# Patient Record
Sex: MALE | Race: OTHER | NOT HISPANIC OR LATINO | Employment: OTHER | URBAN - NONMETROPOLITAN AREA
[De-identification: names, ages, dates, MRNs, and addresses within clinical notes are randomized per-mention and may not be internally consistent; named-entity substitution may affect disease eponyms.]

---

## 2020-05-28 ENCOUNTER — TRANSCRIBE ORDERS (OUTPATIENT)
Dept: ADMINISTRATIVE | Facility: HOSPITAL | Age: 60
End: 2020-05-28

## 2020-05-28 DIAGNOSIS — Z01.818 PRE-OPERATIVE CLEARANCE: Primary | ICD-10-CM

## 2020-05-29 ENCOUNTER — LAB (OUTPATIENT)
Dept: LAB | Facility: HOSPITAL | Age: 60
End: 2020-05-29

## 2020-05-29 DIAGNOSIS — Z01.818 PRE-OPERATIVE CLEARANCE: ICD-10-CM

## 2020-05-29 LAB
REF LAB TEST METHOD: NORMAL
SARS-COV-2 RNA RESP QL NAA+PROBE: NOT DETECTED

## 2020-06-17 ENCOUNTER — TRANSCRIBE ORDERS (OUTPATIENT)
Dept: ADMINISTRATIVE | Facility: HOSPITAL | Age: 60
End: 2020-06-17

## 2020-06-17 DIAGNOSIS — Z01.818 PRE-OPERATIVE CLEARANCE: Primary | ICD-10-CM

## 2020-06-19 ENCOUNTER — LAB (OUTPATIENT)
Dept: LAB | Facility: HOSPITAL | Age: 60
End: 2020-06-19

## 2020-06-19 DIAGNOSIS — Z01.818 PRE-OPERATIVE CLEARANCE: ICD-10-CM

## 2020-06-19 PROCEDURE — U0002 COVID-19 LAB TEST NON-CDC: HCPCS

## 2020-06-19 PROCEDURE — C9803 HOPD COVID-19 SPEC COLLECT: HCPCS

## 2020-06-19 PROCEDURE — U0004 COV-19 TEST NON-CDC HGH THRU: HCPCS

## 2020-06-20 LAB
REF LAB TEST METHOD: NORMAL
SARS-COV-2 RNA RESP QL NAA+PROBE: NOT DETECTED

## 2020-11-02 ENCOUNTER — APPOINTMENT (OUTPATIENT)
Dept: PREADMISSION TESTING | Facility: HOSPITAL | Age: 60
End: 2020-11-02

## 2020-11-02 ENCOUNTER — HOSPITAL ENCOUNTER (OUTPATIENT)
Dept: GENERAL RADIOLOGY | Facility: HOSPITAL | Age: 60
Discharge: HOME | End: 2020-11-02

## 2020-11-02 ENCOUNTER — ANESTHESIA EVENT (OUTPATIENT)
Dept: PERIOP | Facility: HOSPITAL | Age: 60
End: 2020-11-02

## 2020-11-02 VITALS — BODY MASS INDEX: 43.86 KG/M2 | HEIGHT: 62 IN | WEIGHT: 238.32 LBS

## 2020-11-02 LAB
ALBUMIN SERPL-MCNC: 4.1 G/DL (ref 3.5–5.2)
ALBUMIN/GLOB SERPL: 1.6 G/DL
ALP SERPL-CCNC: 29 U/L (ref 39–117)
ALT SERPL W P-5'-P-CCNC: 7 U/L (ref 1–41)
ANION GAP SERPL CALCULATED.3IONS-SCNC: 9 MMOL/L (ref 5–15)
APTT PPP: 30.8 SECONDS (ref 24–37)
AST SERPL-CCNC: 11 U/L (ref 1–40)
BASOPHILS # BLD AUTO: 0.02 10*3/MM3 (ref 0–0.2)
BASOPHILS NFR BLD AUTO: 0.3 % (ref 0–1.5)
BILIRUB SERPL-MCNC: 0.2 MG/DL (ref 0–1.2)
BUN SERPL-MCNC: 9 MG/DL (ref 8–23)
BUN/CREAT SERPL: 11.3 (ref 7–25)
CALCIUM SPEC-SCNC: 9 MG/DL (ref 8.6–10.5)
CHLORIDE SERPL-SCNC: 106 MMOL/L (ref 98–107)
CO2 SERPL-SCNC: 25 MMOL/L (ref 22–29)
CREAT SERPL-MCNC: 0.8 MG/DL (ref 0.76–1.27)
DEPRECATED RDW RBC AUTO: 49.4 FL (ref 37–54)
EOSINOPHIL # BLD AUTO: 0.14 10*3/MM3 (ref 0–0.4)
EOSINOPHIL NFR BLD AUTO: 2.2 % (ref 0.3–6.2)
ERYTHROCYTE [DISTWIDTH] IN BLOOD BY AUTOMATED COUNT: 13.3 % (ref 12.3–15.4)
GFR SERPL CREATININE-BSD FRML MDRD: 119 ML/MIN/1.73
GFR SERPL CREATININE-BSD FRML MDRD: 98 ML/MIN/1.73
GLOBULIN UR ELPH-MCNC: 2.5 GM/DL
GLUCOSE SERPL-MCNC: 107 MG/DL (ref 65–99)
HCT VFR BLD AUTO: 41.6 % (ref 37.5–51)
HGB BLD-MCNC: 12.9 G/DL (ref 13–17.7)
IMM GRANULOCYTES # BLD AUTO: 0.01 10*3/MM3 (ref 0–0.05)
IMM GRANULOCYTES NFR BLD AUTO: 0.2 % (ref 0–0.5)
INR PPP: 1.08 (ref 0.85–1.16)
LYMPHOCYTES # BLD AUTO: 1.07 10*3/MM3 (ref 0.7–3.1)
LYMPHOCYTES NFR BLD AUTO: 16.9 % (ref 19.6–45.3)
MCH RBC QN AUTO: 30.9 PG (ref 26.6–33)
MCHC RBC AUTO-ENTMCNC: 31 G/DL (ref 31.5–35.7)
MCV RBC AUTO: 99.8 FL (ref 79–97)
MONOCYTES # BLD AUTO: 0.55 10*3/MM3 (ref 0.1–0.9)
MONOCYTES NFR BLD AUTO: 8.7 % (ref 5–12)
NEUTROPHILS NFR BLD AUTO: 4.54 10*3/MM3 (ref 1.7–7)
NEUTROPHILS NFR BLD AUTO: 71.7 % (ref 42.7–76)
NRBC BLD AUTO-RTO: 0 /100 WBC (ref 0–0.2)
PLATELET # BLD AUTO: 112 10*3/MM3 (ref 140–450)
PMV BLD AUTO: 11.3 FL (ref 6–12)
POTASSIUM SERPL-SCNC: 4.2 MMOL/L (ref 3.5–5.2)
PROT SERPL-MCNC: 6.6 G/DL (ref 6–8.5)
PROTHROMBIN TIME: 13.7 SECONDS (ref 11.5–14)
QT INTERVAL: 448 MS
QTC INTERVAL: 408 MS
RBC # BLD AUTO: 4.17 10*6/MM3 (ref 4.14–5.8)
SODIUM SERPL-SCNC: 140 MMOL/L (ref 136–145)
WBC # BLD AUTO: 6.33 10*3/MM3 (ref 3.4–10.8)

## 2020-11-02 PROCEDURE — 85730 THROMBOPLASTIN TIME PARTIAL: CPT | Performed by: SURGERY

## 2020-11-02 PROCEDURE — 85610 PROTHROMBIN TIME: CPT | Performed by: SURGERY

## 2020-11-02 PROCEDURE — 80053 COMPREHEN METABOLIC PANEL: CPT | Performed by: SURGERY

## 2020-11-02 PROCEDURE — U0004 COV-19 TEST NON-CDC HGH THRU: HCPCS

## 2020-11-02 PROCEDURE — 71046 X-RAY EXAM CHEST 2 VIEWS: CPT

## 2020-11-02 PROCEDURE — 93005 ELECTROCARDIOGRAM TRACING: CPT

## 2020-11-02 PROCEDURE — 93010 ELECTROCARDIOGRAM REPORT: CPT | Performed by: INTERNAL MEDICINE

## 2020-11-02 PROCEDURE — 36415 COLL VENOUS BLD VENIPUNCTURE: CPT

## 2020-11-02 PROCEDURE — C9803 HOPD COVID-19 SPEC COLLECT: HCPCS

## 2020-11-02 PROCEDURE — 85025 COMPLETE CBC W/AUTO DIFF WBC: CPT | Performed by: SURGERY

## 2020-11-02 RX ORDER — OXYCODONE HCL 20 MG/1
20 TABLET, FILM COATED, EXTENDED RELEASE ORAL EVERY 12 HOURS
COMMUNITY
End: 2020-11-11 | Stop reason: HOSPADM

## 2020-11-02 RX ORDER — TRAZODONE HYDROCHLORIDE 100 MG/1
50 TABLET ORAL 2 TIMES DAILY
COMMUNITY

## 2020-11-02 RX ORDER — GABAPENTIN 400 MG/1
600 CAPSULE ORAL 3 TIMES DAILY
COMMUNITY

## 2020-11-02 RX ORDER — EZETIMIBE 10 MG/1
10 TABLET ORAL NIGHTLY
COMMUNITY

## 2020-11-02 RX ORDER — METOCLOPRAMIDE 10 MG/1
10 TABLET ORAL
COMMUNITY

## 2020-11-02 RX ORDER — OXYCODONE AND ACETAMINOPHEN 10; 325 MG/1; MG/1
1 TABLET ORAL EVERY 6 HOURS PRN
Status: ON HOLD | COMMUNITY
End: 2020-11-11 | Stop reason: SDUPTHER

## 2020-11-02 RX ORDER — NITROGLYCERIN 80 MG/1
1 PATCH TRANSDERMAL DAILY
COMMUNITY

## 2020-11-02 RX ORDER — ASPIRIN 81 MG/1
81 TABLET ORAL DAILY
COMMUNITY

## 2020-11-02 RX ORDER — AMOXICILLIN 875 MG/1
875 TABLET, COATED ORAL 2 TIMES DAILY
COMMUNITY
End: 2020-11-11 | Stop reason: HOSPADM

## 2020-11-02 RX ORDER — LEVOTHYROXINE SODIUM 88 UG/1
88 TABLET ORAL DAILY
COMMUNITY

## 2020-11-02 RX ORDER — TOPIRAMATE 50 MG/1
50 TABLET, FILM COATED ORAL 2 TIMES DAILY
COMMUNITY

## 2020-11-02 RX ORDER — LORAZEPAM 2 MG/1
2 TABLET ORAL EVERY 6 HOURS PRN
COMMUNITY

## 2020-11-02 RX ORDER — CHOLECALCIFEROL (VITAMIN D3) 50 MCG
2000 TABLET ORAL DAILY
COMMUNITY

## 2020-11-02 RX ORDER — FEXOFENADINE HCL AND PSEUDOEPHEDRINE HCI 180; 240 MG/1; MG/1
1 TABLET, EXTENDED RELEASE ORAL DAILY PRN
COMMUNITY

## 2020-11-02 RX ORDER — RISPERIDONE 1 MG/1
1 TABLET ORAL 2 TIMES DAILY
COMMUNITY

## 2020-11-02 RX ORDER — TIZANIDINE 4 MG/1
4 TABLET ORAL EVERY 8 HOURS PRN
COMMUNITY

## 2020-11-02 RX ORDER — NITROGLYCERIN 0.4 MG/1
0.4 TABLET SUBLINGUAL
COMMUNITY

## 2020-11-02 RX ORDER — ROSUVASTATIN CALCIUM 10 MG/1
10 TABLET, COATED ORAL NIGHTLY
COMMUNITY

## 2020-11-02 RX ORDER — FERROUS SULFATE 325(65) MG
325 TABLET ORAL
COMMUNITY

## 2020-11-02 RX ORDER — BUTALBITAL, ACETAMINOPHEN AND CAFFEINE 50; 325; 40 MG/1; MG/1; MG/1
1 TABLET ORAL 2 TIMES DAILY
COMMUNITY

## 2020-11-02 RX ORDER — PHENOL 1.4 %
AEROSOL, SPRAY (ML) MUCOUS MEMBRANE NIGHTLY
COMMUNITY

## 2020-11-02 RX ORDER — ESCITALOPRAM OXALATE 20 MG/1
20 TABLET ORAL 2 TIMES DAILY
COMMUNITY

## 2020-11-02 RX ORDER — ESOMEPRAZOLE MAGNESIUM 40 MG/1
40 CAPSULE, DELAYED RELEASE ORAL
COMMUNITY

## 2020-11-02 NOTE — PAT
Per dr herman orders- anesthesia (dr desir) came and evaluated pt's airway and states okay to proceed.         Message left for layo to call back about antibiotic use.

## 2020-11-03 LAB — SARS-COV-2 RNA RESP QL NAA+PROBE: NOT DETECTED

## 2020-11-04 ENCOUNTER — DOCUMENTATION (OUTPATIENT)
Dept: NUTRITION | Facility: HOSPITAL | Age: 60
End: 2020-11-04

## 2020-11-04 NOTE — PROGRESS NOTES
Oncology Nutrition Screening    Patient Name:  Sonido Villaseñor  YOB: 1960  MRN: [id number]  Date:  11/04/20  Physician: Dr. 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  Patient has had numerous surgeries on his throat related to scar tissue which has led to a 7 inch reduction in his height / wife states that his chin now sits on top of his chest.    There is no problem list on file for this patient.      Current Outpatient Medications   Medication Sig Dispense Refill   • amoxicillin (AMOXIL) 875 MG tablet Take 875 mg by mouth 2 (Two) Times a Day. 10 days -  Started it 30th     • aspirin 81 MG EC tablet Take 81 mg by mouth Daily.     • butalbital-acetaminophen-caffeine (FIORICET, ESGIC) -40 MG per tablet Take 1 tablet by mouth 2 (two) times a day. No more than 2-3 times per week     • Cholecalciferol (Vitamin D) 50 MCG (2000 UT) tablet Take 2,000 Units by mouth Daily.     • escitalopram (LEXAPRO) 20 MG tablet Take 20 mg by mouth 2 (two) times a day.     • esomeprazole (nexIUM) 40 MG capsule Take 40 mg by mouth Every Morning Before Breakfast.     • ezetimibe (ZETIA) 10 MG tablet Take 10 mg by mouth Every Night.     • ferrous sulfate 325 (65 FE) MG tablet Take 325 mg by mouth Daily With Breakfast.     • fexofenadine-pseudoephedrine (ALLEGRA-D 24) 180-240 MG per 24 hr tablet Take 1 tablet by mouth Daily As Needed for Allergies.     • gabapentin (NEURONTIN) 400 MG capsule Take 400 mg by mouth 3 (Three) Times a Day.     • levothyroxine (SYNTHROID, LEVOTHROID) 88 MCG tablet Take 88 mcg by mouth Daily.     • LORazepam (ATIVAN) 2 MG tablet Take 2 mg by mouth Every 6 (Six) Hours As Needed for Anxiety.     • Melatonin 10 MG tablet Take  by mouth Every Night.     • metoclopramide (REGLAN) 10 MG tablet Take 10 mg by mouth 4 (Four) Times a Day Before Meals & at Bedtime.     • metoprolol tartrate (LOPRESSOR) 25 MG tablet Take 25 mg by  mouth 2 (Two) Times a Day.     • nitroglycerin (NITRODUR) 0.4 MG/HR patch Place 1 patch on the skin as directed by provider Daily.     • nitroglycerin (NITROSTAT) 0.4 MG SL tablet Place 0.4 mg under the tongue Every 5 (Five) Minutes As Needed for Chest Pain. Take no more than 3 doses in 15 minutes.     • oxyCODONE ER (oxyCONTIN) 20 MG 12 hr tablet Take 20 mg by mouth Every 12 (Twelve) Hours.     • oxyCODONE-acetaminophen (PERCOCET)  MG per tablet Take 1 tablet by mouth Every 6 (Six) Hours As Needed for Moderate Pain .     • risperiDONE (risperDAL) 1 MG tablet Take 1 mg by mouth 2 (Two) Times a Day.     • rosuvastatin (CRESTOR) 10 MG tablet Take 10 mg by mouth Every Night.     • tiZANidine (ZANAFLEX) 4 MG tablet Take 4 mg by mouth Every 8 (Eight) Hours As Needed for Muscle Spasms.     • topiramate (TOPAMAX) 50 MG tablet Take 50 mg by mouth 2 (Two) Times a Day.     • traZODone (DESYREL) 100 MG tablet Take 50 mg by mouth 2 (two) times a day. 1/2 in morning and 1 1/2 at bed       No current facility-administered medications for this visit.        Glycemic Risk:   NA    Weight:   Height: 62 inches (Normal height prior to throat surgeries was 69 inches  Weight: 238 lbs.  Usual Body Weight: Same    BMI: 43.59  Extremely Obese  Weight has been stable    Oral Food Intake:  Regular Diet - No Restrictions    Hydration Status:   How many 8 ounce glass of water of fluid do you drink per day?  Goal 2000 ml (+)    Enteral Feeding:   PEG being placed 11/5/20    Nutrition Symptoms:   Problems Chewing/Swallowing    Activity:   Able to do little activity and spend most of the day in bed or chair     reports that he quit smoking about 22 months ago. His smoking use included cigarettes. He has a 80.00 pack-year smoking history. He quit smokeless tobacco use about 22 months ago.  His smokeless tobacco use included chew. He reports current alcohol use. He reports that he does not use drugs.    Evaluation of Nutritional Risk:    Patient is scheduled for PEG placement 11/5/20.  Over the course of the past 10 years since his diagnosis of H&N cancer and chemoradiation, he has had 2 prior PEG placements.  The last PEG was removed 18-24 months ago related to infection.    Calories: 1845 calories to maintain current weight (based on IBW)  Protein: 72(+) grams per day  Water: 2000 ml    Currently the patient is consuming a normal diet, but is high risk for aspiration.  Typical intake would be 3 fried eggs for breakfast, along with ocampo and toast; last night for dinner he consumed sauerkraut, BBQ rib meat, potatoes and bread.  He grazes through the day on other foods.  He has maintained his current weight for the past 6-12 months.    The decision to have the PEG placed was recommended by his PCP physician related to his high risk for aspiration.  The patient also with gallbladder disease; a cholecystectomy will be performed tomorrow, as well as the PEG.     The patient wants to continue consuming his normal diet for as long as he is able, despite the aspiration risk.  He is willing to administer at least 2 cans of nutritional supplement per day if needed, but more importantly, they desire placement for meeting hydration goals, as he is currently unable to consume the amount of fluid that is needed.  They are both aware that with time, he may be totally reliant on the enteral feedings for nutritional support as his aspiration risk increases and he is unable to consume oral intake.  There is little information from the chart review to do a nutritional assessment; information was obtained from a 45 minute conversation with patient's wife.    It is highly recommended that the patient be followed by SLP for a swallowing evaluation, assessment of aspiration risk and documentation.    If patient is allowed to continue oral food intake as he is presently, he would not need to administer enteral feedings at this time, but would need to use the access for  meeting hydration goal of 2000ml (+) per day.  The patient's wife is providing the patient with a daily MVI.  Following surgery, patient will probably be placed on a liquid diet with slow advancement to a soft diet consistency.    I have ordered 1 case of Isosource 1.5 for the patient to have if enteral feedings are indicated in the immediate future to get them started.  Patient's wife is very familiar with the process of administering the formula, but will receive refresher verbal and written instructions for administering and PEG maintenance tomorrow when she picks up the enteral formula at [facility]. Arrangements have been made for her to meet with Lorena SOLITARIO @ [facility] for patient education and to  the case of formula @ 11-11:15 am tomorrow, prior to arriving at the hospital for surgery.    I will be available to assist further with the patient's nutritional needs as indicated or requested by the patient;  the patient is only having the PEG placed here and will remain under the care of the PCP in his geographical location.       Electronically signed by:  Alexa Baltazar RD  10:09 EST

## 2020-11-05 ENCOUNTER — ANESTHESIA (OUTPATIENT)
Dept: PERIOP | Facility: HOSPITAL | Age: 60
End: 2020-11-05

## 2020-11-05 ENCOUNTER — HOSPITAL ENCOUNTER (OUTPATIENT)
Facility: HOSPITAL | Age: 60
Discharge: HOME | End: 2020-11-06
Attending: SURGERY | Admitting: SURGERY

## 2020-11-05 ENCOUNTER — APPOINTMENT (OUTPATIENT)
Dept: GENERAL RADIOLOGY | Facility: HOSPITAL | Age: 60
End: 2020-11-05

## 2020-11-05 DIAGNOSIS — K80.20 CHOLELITHIASIS: Primary | ICD-10-CM

## 2020-11-05 DIAGNOSIS — R13.10 DYSPHAGIA, UNSPECIFIED TYPE: ICD-10-CM

## 2020-11-05 PROBLEM — M41.9 RESTRICTIVE LUNG DISEASE DUE TO KYPHOSCOLIOSIS: Status: ACTIVE | Noted: 2019-09-04

## 2020-11-05 PROBLEM — J98.4 RESTRICTIVE LUNG DISEASE DUE TO KYPHOSCOLIOSIS: Status: ACTIVE | Noted: 2019-09-04

## 2020-11-05 PROBLEM — C32.9 SQUAMOUS CELL CARCINOMA OF LARYNX (HCC): Status: ACTIVE | Noted: 2019-09-04

## 2020-11-05 PROCEDURE — G0378 HOSPITAL OBSERVATION PER HR: HCPCS

## 2020-11-05 PROCEDURE — 63710000001 TOPIRAMATE PER 25 MG: Performed by: SURGERY

## 2020-11-05 PROCEDURE — 63710000001 DOCUSATE SODIUM 150 MG/15ML LIQUID: Performed by: SURGERY

## 2020-11-05 PROCEDURE — 25010000003 LIDOCAINE 1 % SOLUTION: Performed by: NURSE ANESTHETIST, CERTIFIED REGISTERED

## 2020-11-05 PROCEDURE — A9270 NON-COVERED ITEM OR SERVICE: HCPCS | Performed by: SURGERY

## 2020-11-05 PROCEDURE — 63710000001 RISPERIDONE 1 MG TABLET: Performed by: SURGERY

## 2020-11-05 PROCEDURE — 63710000001 GABAPENTIN 300 MG CAPSULE: Performed by: SURGERY

## 2020-11-05 PROCEDURE — 25010000002 MORPHINE PER 10 MG: Performed by: SURGERY

## 2020-11-05 PROCEDURE — 25010000002 HYDROMORPHONE PER 4 MG: Performed by: SURGERY

## 2020-11-05 PROCEDURE — 25010000002 HYDROMORPHONE PER 4 MG: Performed by: NURSE ANESTHETIST, CERTIFIED REGISTERED

## 2020-11-05 PROCEDURE — 25010000002 VANCOMYCIN: Performed by: SURGERY

## 2020-11-05 PROCEDURE — 63710000001 OXYCODONE 10 MG TABLET EXTENDED-RELEASE 12 HOUR: Performed by: SURGERY

## 2020-11-05 PROCEDURE — 25010000002 IOPAMIDOL 61 % SOLUTION: Performed by: SURGERY

## 2020-11-05 PROCEDURE — 63710000001 METOCLOPRAMIDE 10 MG TABLET: Performed by: SURGERY

## 2020-11-05 PROCEDURE — 25010000002 PROPOFOL 10 MG/ML EMULSION: Performed by: NURSE ANESTHETIST, CERTIFIED REGISTERED

## 2020-11-05 PROCEDURE — 25010000002 DEXAMETHASONE PER 1 MG: Performed by: NURSE ANESTHETIST, CERTIFIED REGISTERED

## 2020-11-05 PROCEDURE — 63710000001 METOPROLOL TARTRATE 25 MG TABLET: Performed by: SURGERY

## 2020-11-05 PROCEDURE — 25010000002 FENTANYL CITRATE (PF) 100 MCG/2ML SOLUTION: Performed by: NURSE ANESTHETIST, CERTIFIED REGISTERED

## 2020-11-05 PROCEDURE — 74300 X-RAY BILE DUCTS/PANCREAS: CPT

## 2020-11-05 PROCEDURE — 63710000001 TRAZODONE 100 MG TABLET: Performed by: SURGERY

## 2020-11-05 PROCEDURE — 63710000001 ROSUVASTATIN 10 MG TABLET: Performed by: SURGERY

## 2020-11-05 PROCEDURE — 25010000002 ONDANSETRON PER 1 MG: Performed by: NURSE ANESTHETIST, CERTIFIED REGISTERED

## 2020-11-05 PROCEDURE — 88304 TISSUE EXAM BY PATHOLOGIST: CPT | Performed by: SURGERY

## 2020-11-05 DEVICE — LIGACLIP 10-M/L, 10MM ENDOSCOPIC ROTATING MULTIPLE CLIP APPLIERS
Type: IMPLANTABLE DEVICE | Site: ABDOMEN | Status: FUNCTIONAL
Brand: LIGACLIP

## 2020-11-05 RX ORDER — RISPERIDONE 1 MG/1
1 TABLET ORAL EVERY 12 HOURS SCHEDULED
Status: DISCONTINUED | OUTPATIENT
Start: 2020-11-05 | End: 2020-11-06 | Stop reason: HOSPADM

## 2020-11-05 RX ORDER — TIZANIDINE 4 MG/1
4 TABLET ORAL EVERY 8 HOURS PRN
Status: DISCONTINUED | OUTPATIENT
Start: 2020-11-05 | End: 2020-11-06 | Stop reason: HOSPADM

## 2020-11-05 RX ORDER — NALOXONE HCL 0.4 MG/ML
0.4 VIAL (ML) INJECTION
Status: DISCONTINUED | OUTPATIENT
Start: 2020-11-05 | End: 2020-11-06 | Stop reason: HOSPADM

## 2020-11-05 RX ORDER — ESCITALOPRAM OXALATE 20 MG/1
20 TABLET ORAL DAILY
Status: DISCONTINUED | OUTPATIENT
Start: 2020-11-06 | End: 2020-11-06 | Stop reason: HOSPADM

## 2020-11-05 RX ORDER — ONDANSETRON 2 MG/ML
4 INJECTION INTRAMUSCULAR; INTRAVENOUS EVERY 6 HOURS PRN
Status: DISCONTINUED | OUTPATIENT
Start: 2020-11-05 | End: 2020-11-06 | Stop reason: HOSPADM

## 2020-11-05 RX ORDER — SODIUM CHLORIDE, SODIUM LACTATE, POTASSIUM CHLORIDE, CALCIUM CHLORIDE 600; 310; 30; 20 MG/100ML; MG/100ML; MG/100ML; MG/100ML
9 INJECTION, SOLUTION INTRAVENOUS CONTINUOUS
Status: DISCONTINUED | OUTPATIENT
Start: 2020-11-05 | End: 2020-11-05

## 2020-11-05 RX ORDER — SODIUM CHLORIDE, SODIUM LACTATE, POTASSIUM CHLORIDE, CALCIUM CHLORIDE 600; 310; 30; 20 MG/100ML; MG/100ML; MG/100ML; MG/100ML
125 INJECTION, SOLUTION INTRAVENOUS CONTINUOUS
Status: DISCONTINUED | OUTPATIENT
Start: 2020-11-05 | End: 2020-11-06 | Stop reason: HOSPADM

## 2020-11-05 RX ORDER — LORAZEPAM 1 MG/1
2 TABLET ORAL EVERY 6 HOURS PRN
Status: DISCONTINUED | OUTPATIENT
Start: 2020-11-05 | End: 2020-11-06 | Stop reason: HOSPADM

## 2020-11-05 RX ORDER — TOPIRAMATE 25 MG/1
50 TABLET ORAL EVERY 12 HOURS SCHEDULED
Status: DISCONTINUED | OUTPATIENT
Start: 2020-11-05 | End: 2020-11-06 | Stop reason: HOSPADM

## 2020-11-05 RX ORDER — ONDANSETRON 4 MG/1
4 TABLET, FILM COATED ORAL EVERY 6 HOURS PRN
Status: DISCONTINUED | OUTPATIENT
Start: 2020-11-05 | End: 2020-11-06 | Stop reason: HOSPADM

## 2020-11-05 RX ORDER — TRAZODONE HYDROCHLORIDE 50 MG/1
50 TABLET ORAL DAILY
Status: DISCONTINUED | OUTPATIENT
Start: 2020-11-06 | End: 2020-11-06 | Stop reason: HOSPADM

## 2020-11-05 RX ORDER — PROPOFOL 10 MG/ML
VIAL (ML) INTRAVENOUS AS NEEDED
Status: DISCONTINUED | OUTPATIENT
Start: 2020-11-05 | End: 2020-11-05 | Stop reason: SURG

## 2020-11-05 RX ORDER — METOCLOPRAMIDE 10 MG/1
10 TABLET ORAL
Status: DISCONTINUED | OUTPATIENT
Start: 2020-11-05 | End: 2020-11-06 | Stop reason: HOSPADM

## 2020-11-05 RX ORDER — ASPIRIN 81 MG/1
81 TABLET ORAL DAILY
Status: DISCONTINUED | OUTPATIENT
Start: 2020-11-06 | End: 2020-11-06 | Stop reason: HOSPADM

## 2020-11-05 RX ORDER — ONDANSETRON 2 MG/ML
INJECTION INTRAMUSCULAR; INTRAVENOUS AS NEEDED
Status: DISCONTINUED | OUTPATIENT
Start: 2020-11-05 | End: 2020-11-05 | Stop reason: SURG

## 2020-11-05 RX ORDER — HEPARIN SODIUM 5000 [USP'U]/ML
5000 INJECTION, SOLUTION INTRAVENOUS; SUBCUTANEOUS EVERY 8 HOURS SCHEDULED
Status: DISCONTINUED | OUTPATIENT
Start: 2020-11-06 | End: 2020-11-06 | Stop reason: HOSPADM

## 2020-11-05 RX ORDER — NITROGLYCERIN 0.4 MG/1
0.4 TABLET SUBLINGUAL
Status: DISCONTINUED | OUTPATIENT
Start: 2020-11-05 | End: 2020-11-06 | Stop reason: HOSPADM

## 2020-11-05 RX ORDER — SODIUM CHLORIDE 9 MG/ML
INJECTION, SOLUTION INTRAVENOUS AS NEEDED
Status: DISCONTINUED | OUTPATIENT
Start: 2020-11-05 | End: 2020-11-05 | Stop reason: HOSPADM

## 2020-11-05 RX ORDER — NALOXONE HCL 0.4 MG/ML
0.1 VIAL (ML) INJECTION
Status: DISCONTINUED | OUTPATIENT
Start: 2020-11-05 | End: 2020-11-06 | Stop reason: HOSPADM

## 2020-11-05 RX ORDER — OXYCODONE HCL 10 MG/1
20 TABLET, FILM COATED, EXTENDED RELEASE ORAL EVERY 12 HOURS
Status: DISCONTINUED | OUTPATIENT
Start: 2020-11-05 | End: 2020-11-06 | Stop reason: HOSPADM

## 2020-11-05 RX ORDER — NITROGLYCERIN 80 MG/1
1 PATCH TRANSDERMAL DAILY
Status: DISCONTINUED | OUTPATIENT
Start: 2020-11-06 | End: 2020-11-06 | Stop reason: HOSPADM

## 2020-11-05 RX ORDER — BUPIVACAINE HYDROCHLORIDE AND EPINEPHRINE 5; 5 MG/ML; UG/ML
INJECTION, SOLUTION PERINEURAL AS NEEDED
Status: DISCONTINUED | OUTPATIENT
Start: 2020-11-05 | End: 2020-11-05 | Stop reason: HOSPADM

## 2020-11-05 RX ORDER — DOCUSATE SODIUM 100 MG/1
100 CAPSULE, LIQUID FILLED ORAL 2 TIMES DAILY PRN
Status: DISCONTINUED | OUTPATIENT
Start: 2020-11-05 | End: 2020-11-06 | Stop reason: HOSPADM

## 2020-11-05 RX ORDER — ONDANSETRON 2 MG/ML
4 INJECTION INTRAMUSCULAR; INTRAVENOUS ONCE AS NEEDED
Status: DISCONTINUED | OUTPATIENT
Start: 2020-11-05 | End: 2020-11-05 | Stop reason: HOSPADM

## 2020-11-05 RX ORDER — FAMOTIDINE 10 MG/ML
20 INJECTION, SOLUTION INTRAVENOUS ONCE
Status: CANCELLED | OUTPATIENT
Start: 2020-11-05 | End: 2020-11-05

## 2020-11-05 RX ORDER — FENTANYL CITRATE 50 UG/ML
50 INJECTION, SOLUTION INTRAMUSCULAR; INTRAVENOUS
Status: DISCONTINUED | OUTPATIENT
Start: 2020-11-05 | End: 2020-11-05 | Stop reason: HOSPADM

## 2020-11-05 RX ORDER — PANTOPRAZOLE SODIUM 40 MG/1
40 TABLET, DELAYED RELEASE ORAL
Status: DISCONTINUED | OUTPATIENT
Start: 2020-11-06 | End: 2020-11-06 | Stop reason: HOSPADM

## 2020-11-05 RX ORDER — GLYCOPYRROLATE 0.2 MG/ML
INJECTION INTRAMUSCULAR; INTRAVENOUS AS NEEDED
Status: DISCONTINUED | OUTPATIENT
Start: 2020-11-05 | End: 2020-11-05 | Stop reason: SURG

## 2020-11-05 RX ORDER — ROCURONIUM BROMIDE 10 MG/ML
INJECTION, SOLUTION INTRAVENOUS AS NEEDED
Status: DISCONTINUED | OUTPATIENT
Start: 2020-11-05 | End: 2020-11-05 | Stop reason: SURG

## 2020-11-05 RX ORDER — FERROUS SULFATE 325(65) MG
325 TABLET ORAL
Status: DISCONTINUED | OUTPATIENT
Start: 2020-11-06 | End: 2020-11-06 | Stop reason: HOSPADM

## 2020-11-05 RX ORDER — SIMETHICONE 80 MG
80 TABLET,CHEWABLE ORAL 4 TIMES DAILY PRN
Status: DISCONTINUED | OUTPATIENT
Start: 2020-11-05 | End: 2020-11-06 | Stop reason: HOSPADM

## 2020-11-05 RX ORDER — SODIUM CHLORIDE 0.9 % (FLUSH) 0.9 %
10 SYRINGE (ML) INJECTION AS NEEDED
Status: DISCONTINUED | OUTPATIENT
Start: 2020-11-05 | End: 2020-11-05 | Stop reason: HOSPADM

## 2020-11-05 RX ORDER — DEXAMETHASONE SODIUM PHOSPHATE 4 MG/ML
INJECTION, SOLUTION INTRA-ARTICULAR; INTRALESIONAL; INTRAMUSCULAR; INTRAVENOUS; SOFT TISSUE AS NEEDED
Status: DISCONTINUED | OUTPATIENT
Start: 2020-11-05 | End: 2020-11-05 | Stop reason: SURG

## 2020-11-05 RX ORDER — PROMETHAZINE HYDROCHLORIDE 6.25 MG/5ML
12.5 SYRUP ORAL EVERY 6 HOURS PRN
Status: DISCONTINUED | OUTPATIENT
Start: 2020-11-05 | End: 2020-11-06 | Stop reason: HOSPADM

## 2020-11-05 RX ORDER — FAMOTIDINE 10 MG/ML
20 INJECTION, SOLUTION INTRAVENOUS 2 TIMES DAILY
Status: DISCONTINUED | OUTPATIENT
Start: 2020-11-05 | End: 2020-11-05 | Stop reason: HOSPADM

## 2020-11-05 RX ORDER — HYDROMORPHONE HYDROCHLORIDE 1 MG/ML
0.5 INJECTION, SOLUTION INTRAMUSCULAR; INTRAVENOUS; SUBCUTANEOUS
Status: DISCONTINUED | OUTPATIENT
Start: 2020-11-05 | End: 2020-11-06 | Stop reason: HOSPADM

## 2020-11-05 RX ORDER — MORPHINE SULFATE 2 MG/ML
2 INJECTION, SOLUTION INTRAMUSCULAR; INTRAVENOUS
Status: DISCONTINUED | OUTPATIENT
Start: 2020-11-05 | End: 2020-11-06 | Stop reason: HOSPADM

## 2020-11-05 RX ORDER — GABAPENTIN 300 MG/1
600 CAPSULE ORAL 3 TIMES DAILY
Status: DISCONTINUED | OUTPATIENT
Start: 2020-11-05 | End: 2020-11-06 | Stop reason: HOSPADM

## 2020-11-05 RX ORDER — ROSUVASTATIN CALCIUM 10 MG/1
10 TABLET, COATED ORAL NIGHTLY
Status: DISCONTINUED | OUTPATIENT
Start: 2020-11-05 | End: 2020-11-06 | Stop reason: HOSPADM

## 2020-11-05 RX ORDER — LIDOCAINE HYDROCHLORIDE 10 MG/ML
INJECTION, SOLUTION INFILTRATION; PERINEURAL AS NEEDED
Status: DISCONTINUED | OUTPATIENT
Start: 2020-11-05 | End: 2020-11-05 | Stop reason: SURG

## 2020-11-05 RX ORDER — LEVOTHYROXINE SODIUM 88 UG/1
88 TABLET ORAL
Status: DISCONTINUED | OUTPATIENT
Start: 2020-11-06 | End: 2020-11-06 | Stop reason: HOSPADM

## 2020-11-05 RX ORDER — DOCUSATE SODIUM 50 MG/5 ML
100 LIQUID (ML) ORAL DAILY
Status: DISCONTINUED | OUTPATIENT
Start: 2020-11-05 | End: 2020-11-06 | Stop reason: HOSPADM

## 2020-11-05 RX ORDER — SODIUM CHLORIDE 0.9 % (FLUSH) 0.9 %
10 SYRINGE (ML) INJECTION EVERY 12 HOURS SCHEDULED
Status: DISCONTINUED | OUTPATIENT
Start: 2020-11-05 | End: 2020-11-05 | Stop reason: HOSPADM

## 2020-11-05 RX ORDER — FENTANYL CITRATE 50 UG/ML
INJECTION, SOLUTION INTRAMUSCULAR; INTRAVENOUS AS NEEDED
Status: DISCONTINUED | OUTPATIENT
Start: 2020-11-05 | End: 2020-11-05 | Stop reason: SURG

## 2020-11-05 RX ORDER — EPHEDRINE SULFATE 50 MG/ML
INJECTION, SOLUTION INTRAVENOUS AS NEEDED
Status: DISCONTINUED | OUTPATIENT
Start: 2020-11-05 | End: 2020-11-05 | Stop reason: SURG

## 2020-11-05 RX ORDER — LIDOCAINE HYDROCHLORIDE 10 MG/ML
0.5 INJECTION, SOLUTION EPIDURAL; INFILTRATION; INTRACAUDAL; PERINEURAL ONCE AS NEEDED
Status: COMPLETED | OUTPATIENT
Start: 2020-11-05 | End: 2020-11-05

## 2020-11-05 RX ORDER — HYDROMORPHONE HYDROCHLORIDE 1 MG/ML
0.5 INJECTION, SOLUTION INTRAMUSCULAR; INTRAVENOUS; SUBCUTANEOUS
Status: DISCONTINUED | OUTPATIENT
Start: 2020-11-05 | End: 2020-11-05 | Stop reason: HOSPADM

## 2020-11-05 RX ORDER — OXYCODONE AND ACETAMINOPHEN 10; 325 MG/1; MG/1
1 TABLET ORAL EVERY 6 HOURS PRN
Status: DISCONTINUED | OUTPATIENT
Start: 2020-11-05 | End: 2020-11-06 | Stop reason: HOSPADM

## 2020-11-05 RX ORDER — FAMOTIDINE 20 MG/1
20 TABLET, FILM COATED ORAL ONCE
Status: DISCONTINUED | OUTPATIENT
Start: 2020-11-05 | End: 2020-11-05

## 2020-11-05 RX ADMIN — FENTANYL CITRATE 50 MCG: 0.05 INJECTION, SOLUTION INTRAMUSCULAR; INTRAVENOUS at 15:54

## 2020-11-05 RX ADMIN — HYDROMORPHONE HYDROCHLORIDE 0.5 MG: 1 INJECTION, SOLUTION INTRAMUSCULAR; INTRAVENOUS; SUBCUTANEOUS at 19:03

## 2020-11-05 RX ADMIN — TRAZODONE HYDROCHLORIDE 150 MG: 100 TABLET ORAL at 20:39

## 2020-11-05 RX ADMIN — DEXAMETHASONE SODIUM PHOSPHATE 8 MG: 4 INJECTION, SOLUTION INTRAMUSCULAR; INTRAVENOUS at 14:17

## 2020-11-05 RX ADMIN — GABAPENTIN 600 MG: 300 CAPSULE ORAL at 20:39

## 2020-11-05 RX ADMIN — SODIUM CHLORIDE, POTASSIUM CHLORIDE, SODIUM LACTATE AND CALCIUM CHLORIDE 9 ML/HR: 600; 310; 30; 20 INJECTION, SOLUTION INTRAVENOUS at 13:14

## 2020-11-05 RX ADMIN — RISPERIDONE 1 MG: 1 TABLET ORAL at 20:39

## 2020-11-05 RX ADMIN — FENTANYL CITRATE 50 MCG: 0.05 INJECTION, SOLUTION INTRAMUSCULAR; INTRAVENOUS at 16:10

## 2020-11-05 RX ADMIN — LIDOCAINE HYDROCHLORIDE 0.5 ML: 10 INJECTION, SOLUTION EPIDURAL; INFILTRATION; INTRACAUDAL; PERINEURAL at 13:14

## 2020-11-05 RX ADMIN — ROSUVASTATIN CALCIUM 10 MG: 10 TABLET, COATED ORAL at 20:39

## 2020-11-05 RX ADMIN — PROPOFOL 150 MG: 10 INJECTION, EMULSION INTRAVENOUS at 14:17

## 2020-11-05 RX ADMIN — OXYCODONE HYDROCHLORIDE 20 MG: 10 TABLET, FILM COATED, EXTENDED RELEASE ORAL at 20:39

## 2020-11-05 RX ADMIN — HYDROMORPHONE HYDROCHLORIDE 0.5 MG: 1 INJECTION, SOLUTION INTRAMUSCULAR; INTRAVENOUS; SUBCUTANEOUS at 22:55

## 2020-11-05 RX ADMIN — DOCUSATE SODIUM 100 MG: 50 LIQUID ORAL at 17:32

## 2020-11-05 RX ADMIN — FENTANYL CITRATE 100 MCG: 0.05 INJECTION, SOLUTION INTRAMUSCULAR; INTRAVENOUS at 14:17

## 2020-11-05 RX ADMIN — EPHEDRINE SULFATE 20 MG: 50 INJECTION, SOLUTION INTRAVENOUS at 14:37

## 2020-11-05 RX ADMIN — MORPHINE SULFATE 2 MG: 2 INJECTION, SOLUTION INTRAMUSCULAR; INTRAVENOUS at 17:32

## 2020-11-05 RX ADMIN — SUGAMMADEX 200 MG: 100 INJECTION, SOLUTION INTRAVENOUS at 15:23

## 2020-11-05 RX ADMIN — ROCURONIUM BROMIDE 50 MG: 10 INJECTION INTRAVENOUS at 14:17

## 2020-11-05 RX ADMIN — TOPIRAMATE 50 MG: 25 TABLET, FILM COATED ORAL at 20:39

## 2020-11-05 RX ADMIN — METOCLOPRAMIDE 10 MG: 10 TABLET ORAL at 20:39

## 2020-11-05 RX ADMIN — METOCLOPRAMIDE 10 MG: 10 TABLET ORAL at 17:32

## 2020-11-05 RX ADMIN — METOPROLOL TARTRATE 25 MG: 25 TABLET, FILM COATED ORAL at 20:39

## 2020-11-05 RX ADMIN — VANCOMYCIN HYDROCHLORIDE 1500 MG: 10 INJECTION, POWDER, LYOPHILIZED, FOR SOLUTION INTRAVENOUS at 13:25

## 2020-11-05 RX ADMIN — GLYCOPYRROLATE 0.2 MG: 0.2 INJECTION INTRAMUSCULAR; INTRAVENOUS at 14:33

## 2020-11-05 RX ADMIN — MORPHINE SULFATE 2 MG: 2 INJECTION, SOLUTION INTRAMUSCULAR; INTRAVENOUS at 20:39

## 2020-11-05 RX ADMIN — ONDANSETRON 4 MG: 2 INJECTION INTRAMUSCULAR; INTRAVENOUS at 14:27

## 2020-11-05 RX ADMIN — HYDROMORPHONE HYDROCHLORIDE 0.5 MG: 1 INJECTION, SOLUTION INTRAMUSCULAR; INTRAVENOUS; SUBCUTANEOUS at 16:40

## 2020-11-05 RX ADMIN — LIDOCAINE HYDROCHLORIDE 50 MG: 10 INJECTION, SOLUTION INFILTRATION; PERINEURAL at 14:17

## 2020-11-05 RX ADMIN — SODIUM CHLORIDE, POTASSIUM CHLORIDE, SODIUM LACTATE AND CALCIUM CHLORIDE 125 ML/HR: 600; 310; 30; 20 INJECTION, SOLUTION INTRAVENOUS at 17:32

## 2020-11-05 RX ADMIN — SODIUM CHLORIDE, POTASSIUM CHLORIDE, SODIUM LACTATE AND CALCIUM CHLORIDE: 600; 310; 30; 20 INJECTION, SOLUTION INTRAVENOUS at 15:04

## 2020-11-05 NOTE — PLAN OF CARE
Problem: Adult Inpatient Plan of Care  Goal: Plan of Care Review  Outcome: Ongoing, Progressing  Flowsheets (Taken 11/5/2020 1812)  Progress: improving  Plan of Care Reviewed With: patient  Outcome Summary:   recieved pt from PACU   VSS on 4L NC   given PRN pain meds   pt resting   PEG clamped at 1700   awaiting nutrition consult for tube feed orders  Goal: Patient-Specific Goal (Individualized)  Outcome: Ongoing, Progressing  Goal: Absence of Hospital-Acquired Illness or Injury  Outcome: Ongoing, Progressing  Intervention: Identify and Manage Fall Risk  Recent Flowsheet Documentation  Taken 11/5/2020 1800 by Laura Madrid RN  Safety Promotion/Fall Prevention:   activity supervised   assistive device/personal items within reach   clutter free environment maintained   nonskid shoes/slippers when out of bed   room organization consistent   safety round/check completed   toileting scheduled  Taken 11/5/2020 1732 by Laura Madrid RN  Safety Promotion/Fall Prevention:   activity supervised   assistive device/personal items within reach   clutter free environment maintained   fall prevention program maintained   nonskid shoes/slippers when out of bed   room organization consistent   safety round/check completed   toileting scheduled  Intervention: Prevent Skin Injury  Recent Flowsheet Documentation  Taken 11/5/2020 1800 by Laura Madrid RN  Body Position: supine  Taken 11/5/2020 1732 by Laura Madrid RN  Body Position: supine  Intervention: Prevent and Manage VTE (venous thromboembolism) Risk  Recent Flowsheet Documentation  Taken 11/5/2020 1732 by Laura Madrid RN  VTE Prevention/Management:   bilateral   sequential compression devices on  Intervention: Prevent Infection  Recent Flowsheet Documentation  Taken 11/5/2020 1732 by Laura Madrid RN  Infection Prevention:   rest/sleep promoted   single patient room provided  Goal: Optimal Comfort and Wellbeing  Outcome: Ongoing,  Progressing  Intervention: Provide Person-Centered Care  Recent Flowsheet Documentation  Taken 11/5/2020 1732 by Laura Madrid RN  Trust Relationship/Rapport:   care explained   choices provided   emotional support provided   empathic listening provided   questions answered   questions encouraged   reassurance provided   thoughts/feelings acknowledged  Goal: Readiness for Transition of Care  Outcome: Ongoing, Progressing  Intervention: Mutually Develop Transition Plan  Recent Flowsheet Documentation  Taken 11/5/2020 1749 by Laura Madrid RN  Transportation Anticipated: family or friend will provide  Patient/Family Anticipated Services at Transition: none  Patient/Family Anticipates Transition to: home with family     Problem: Bleeding (Surgery Nonspecified)  Goal: Absence of Bleeding  Outcome: Ongoing, Progressing     Problem: Bowel Elimination Impaired (Surgery Nonspecified)  Goal: Effective Bowel Elimination  Outcome: Ongoing, Progressing     Problem: Infection (Surgery Nonspecified)  Goal: Absence of Infection Signs and Symptoms  Outcome: Ongoing, Progressing     Problem: Ongoing Anesthesia Effects (Surgery Nonspecified)  Goal: Anesthesia/Sedation Recovery  Outcome: Ongoing, Progressing  Intervention: Optimize Anesthesia Recovery  Recent Flowsheet Documentation  Taken 11/5/2020 1800 by Laura Mdarid RN  Safety Promotion/Fall Prevention:   activity supervised   assistive device/personal items within reach   clutter free environment maintained   nonskid shoes/slippers when out of bed   room organization consistent   safety round/check completed   toileting scheduled  Taken 11/5/2020 1732 by Laura Madrid RN  Safety Promotion/Fall Prevention:   activity supervised   assistive device/personal items within reach   clutter free environment maintained   fall prevention program maintained   nonskid shoes/slippers when out of bed   room organization consistent   safety round/check completed   toileting  scheduled     Problem: Pain (Surgery Nonspecified)  Goal: Acceptable Pain Control  Outcome: Ongoing, Progressing  Intervention: Prevent or Manage Pain  Recent Flowsheet Documentation  Taken 11/5/2020 1800 by Laura Madrid RN  Pain Management Interventions: quiet environment facilitated  Taken 11/5/2020 1732 by Laura Madrid RN  Pain Management Interventions: see MAR  Diversional Activities:   television   smartphone     Problem: Postoperative Nausea and Vomiting (Surgery Nonspecified)  Goal: Nausea and Vomiting Relief  Outcome: Ongoing, Progressing     Problem: Postoperative Urinary Retention (Surgery Nonspecified)  Goal: Effective Urinary Elimination  Outcome: Ongoing, Progressing     Problem: Pain Acute  Goal: Optimal Pain Control  Outcome: Ongoing, Progressing  Intervention: Develop Pain Management Plan  Recent Flowsheet Documentation  Taken 11/5/2020 1800 by Laura Madrid RN  Pain Management Interventions: quiet environment facilitated  Taken 11/5/2020 1732 by Laura Madrid RN  Pain Management Interventions: see MAR  Intervention: Optimize Psychosocial Wellbeing  Recent Flowsheet Documentation  Taken 11/5/2020 1732 by Laura Madrid RN  Diversional Activities:   television   smartphone     Problem: Fall Injury Risk  Goal: Absence of Fall and Fall-Related Injury  Outcome: Ongoing, Progressing  Intervention: Identify and Manage Contributors to Fall Injury Risk  Recent Flowsheet Documentation  Taken 11/5/2020 1800 by Laura Madrid RN  Medication Review/Management: medications reviewed  Taken 11/5/2020 1732 by Laura Madrid RN  Medication Review/Management: medications reviewed  Intervention: Promote Injury-Free Environment  Recent Flowsheet Documentation  Taken 11/5/2020 1800 by Laura Madrid RN  Safety Promotion/Fall Prevention:   activity supervised   assistive device/personal items within reach   clutter free environment maintained   nonskid shoes/slippers when out of bed    room organization consistent   safety round/check completed   toileting scheduled  Taken 11/5/2020 1732 by Laura Madrid, RN  Safety Promotion/Fall Prevention:   activity supervised   assistive device/personal items within reach   clutter free environment maintained   fall prevention program maintained   nonskid shoes/slippers when out of bed   room organization consistent   safety round/check completed   toileting scheduled   Goal Outcome Evaluation:  Plan of Care Reviewed With: patient  Progress: improving  Outcome Summary: recieved pt from PACU; VSS on 4L NC; given PRN pain meds; pt resting; PEG clamped at 1700; awaiting nutrition consult for tube feed orders

## 2020-11-05 NOTE — BRIEF OP NOTE
CHOLECYSTECTOMY LAPAROSCOPIC INTRAOPERATIVE CHOLANGIOGRAM, ESOPHAGOGASTRODUODENOSCOPY WITH PERCUTANEOUS ENDOSCOPIC GASTROSTOMY TUBE INSERTION  Progress Note    Sonido Villaseñor  11/5/2020    Pre-op Diagnosis:   1. Symptomatic cholelithiasis  2. Dysphagia       Post-Op Diagnosis Codes:   Same    Procedure/CPT® Codes:        Procedure(s):  CHOLECYSTECTOMY LAPAROSCOPIC , IOC  ESOPHAGOGASTRODUODENOSCOPY WITH PERCUTANEOUS ENDOSCOPIC GASTROSTOMY TUBE INSERTION    Surgeon(s):  Kevin Fajardo MD    Anesthesia: General    Staff:   Circulator: Gail Melo RN  Physician Assistant: Minerva Diallo PA-C  Scrub Person: Rajeev Kim RN  Nursing Assistant: Moses Cristobal         Estimated Blood Loss: 50 mL    Urine Voided: * No values recorded between 11/5/2020  2:13 PM and 11/5/2020  3:26 PM *    Specimens:                Specimens     ID Source Type Tests Collected By Collected At Frozen?      A Gallbladder Tissue · TISSUE PATHOLOGY EXAM   Kevin Fajardo MD 11/5/20 1454 No     Description: GALLBLADDER                Drains:   Gastrostomy/Enterostomy Percutaneous endoscopic gastrostomy (PEG) 1 20 Fr. RUQ (Active)       Findings:   1. IOC (-)  2. 20 Fr PEG at the 3 cm level    Complications: None          Kevin Fajardo MD     Date: 11/5/2020  Time: 15:26 EST

## 2020-11-05 NOTE — ANESTHESIA POSTPROCEDURE EVALUATION
Patient: Sonido Villaseñor    Procedure Summary     Date: 11/05/20 Room / [facility]:  [facility]    Anesthesia Start: 1413 Anesthesia Stop: 1557    Procedures:       CHOLECYSTECTOMY LAPAROSCOPIC , IOC (N/A Abdomen)      ESOPHAGOGASTRODUODENOSCOPY WITH PERCUTANEOUS ENDOSCOPIC GASTROSTOMY TUBE INSERTION (N/A Esophagus) Diagnosis:     Surgeon: Kevin Fajardo MD Provider: Joshua Rod MD    Anesthesia Type: general ASA Status: 3          Anesthesia Type: general    Vitals  Vitals Value Taken Time   /76 11/05/20 1556   Temp 97.8 °F (36.6 °C) 11/05/20 1556   Pulse 60 11/05/20 1556   Resp 16 11/05/20 1556   SpO2 100 % 11/05/20 1556           Post Anesthesia Care and Evaluation    Patient location during evaluation: PACU  Patient participation: complete - patient participated  Level of consciousness: awake and alert  Pain management: adequate  Airway patency: patent  Anesthetic complications: No anesthetic complications  PONV Status: none  Cardiovascular status: acceptable  Respiratory status: acceptable  Hydration status: acceptable

## 2020-11-05 NOTE — H&P
Pre-Op H&P  Sonido Villaseñor  [id number]  1960      Chief complaint: Gallstones, abdominal pain      Subjective:  Patient is a 60 y.o.male presents for scheduled surgery by Dr. Fajardo.  He anticipates a laparoscopic cholecystectomy today.  He said he has had issues with his gallbladder for about a year now.  He has intermittent abdominal pain, nausea and vomiting.  He also has evidence of a paraesophageal hernia.  He is a poor surgical candidate for definitive management, and in addition has had difficulties with dysphagia with aspiration even of thin liquids.  We have therefore have decided to proceed with combination laparoscopic cholecystectomy, as well as EGD and PEG placement.      Review of Systems:  Constitutional-- No fever, chills or sweats. No fatigue.  CV-- + chest pain and palpitation  Resp-- No cough, hemoptysis. +SOB  GI- + nausea and vomiting, pain  Skin--No rashes or lesions      Allergies:   Allergies   Allergen Reactions    Cefaclor Anaphylaxis    Ceftriaxone Rash     Latex: No  Contrast Dye: No      Home Meds:  Medications Prior to Admission   Medication Sig Dispense Refill Last Dose    amoxicillin (AMOXIL) 875 MG tablet Take 875 mg by mouth 2 (Two) Times a Day. 10 days -  Started it 30th 11/4/2020 at 2000    aspirin 81 MG EC tablet Take 81 mg by mouth Daily.   11/4/2020 at 2300    butalbital-acetaminophen-caffeine (FIORICET, ESGIC) -40 MG per tablet Take 1 tablet by mouth 2 (two) times a day. No more than 2-3 times per week   11/4/2020 at 1530    Cholecalciferol (Vitamin D) 50 MCG (2000 UT) tablet Take 2,000 Units by mouth Daily.   11/5/2020 at 0730    escitalopram (LEXAPRO) 20 MG tablet Take 20 mg by mouth 2 (two) times a day.   11/5/2020 at 0730    esomeprazole (nexIUM) 40 MG capsule Take 40 mg by mouth Every Morning Before Breakfast.   11/5/2020 at 0730    ezetimibe (ZETIA) 10 MG tablet Take 10 mg by mouth Every Night.   11/4/2020 at 2030    gabapentin (NEURONTIN) 400 MG capsule  Take 600 mg by mouth 3 (Three) Times a Day.   11/5/2020 at 0730    levothyroxine (SYNTHROID, LEVOTHROID) 88 MCG tablet Take 88 mcg by mouth Daily.   11/5/2020 at 0700    linaclotide (LINZESS) 290 MCG capsule capsule Take 290 mcg by mouth Every Morning Before Breakfast.   11/4/2020 at Unknown time    LORazepam (ATIVAN) 2 MG tablet Take 2 mg by mouth Every 6 (Six) Hours As Needed for Anxiety.   11/5/2020 at 0730    Melatonin 10 MG tablet Take  by mouth Every Night.   Past Week at Unknown time    metoclopramide (REGLAN) 10 MG tablet Take 10 mg by mouth 4 (Four) Times a Day Before Meals & at Bedtime.   11/5/2020 at 0730    metoprolol tartrate (LOPRESSOR) 25 MG tablet Take 25 mg by mouth 2 (Two) Times a Day.   11/4/2020 at 0730    nitroglycerin (NITRODUR) 0.4 MG/HR patch Place 1 patch on the skin as directed by provider Daily.   11/5/2020 at 0730    oxyCODONE ER (oxyCONTIN) 20 MG 12 hr tablet Take 20 mg by mouth Every 12 (Twelve) Hours.   11/5/2020 at 0730    oxyCODONE-acetaminophen (PERCOCET)  MG per tablet Take 1 tablet by mouth Every 6 (Six) Hours As Needed for Moderate Pain .   11/5/2020 at 1030    risperiDONE (risperDAL) 1 MG tablet Take 1 mg by mouth 2 (Two) Times a Day.   11/5/2020 at 0730    rosuvastatin (CRESTOR) 10 MG tablet Take 10 mg by mouth Every Night.   11/5/2020 at 0730    tiZANidine (ZANAFLEX) 4 MG tablet Take 4 mg by mouth Every 8 (Eight) Hours As Needed for Muscle Spasms.   11/5/2020 at 0730    topiramate (TOPAMAX) 50 MG tablet Take 50 mg by mouth 2 (Two) Times a Day.   11/5/2020 at 0730    traZODone (DESYREL) 100 MG tablet Take 50 mg by mouth 2 (two) times a day. 1/2 in morning and 1 1/2 at bed   11/5/2020 at 0730    ferrous sulfate 325 (65 FE) MG tablet Take 325 mg by mouth Daily With Breakfast.       fexofenadine-pseudoephedrine (ALLEGRA-D 24) 180-240 MG per 24 hr tablet Take 1 tablet by mouth Daily As Needed for Allergies.       nitroglycerin (NITROSTAT) 0.4 MG SL tablet Place 0.4 mg under  "the tongue Every 5 (Five) Minutes As Needed for Chest Pain. Take no more than 3 doses in 15 minutes.            PMH:   Past Medical History:   Diagnosis Date    ARDS (adult respiratory distress syndrome) (CMS/HCC)     Arthritis     Cancer (CMS/HCC)     throat cancer     Chest pain     Constipation     COPD (chronic obstructive pulmonary disease) (CMS/HCC)     Dizzy     Dry skin     Full dentures     Fungus infection     left thumb- oral antibx use     GERD (gastroesophageal reflux disease)     Hard to intubate     Headache     Hiatal hernia     still present     Hypertension     Migraine     Nausea and vomiting     On home oxygen therapy     2-2.5 prn and nightly     Sleep apnea     bipap machine compliant - \"suped up bipap\"      SOBOE (shortness of breath on exertion)     Swallowing difficulty     larger items - from radiation issue     Wears glasses     readers     PSH:    Past Surgical History:   Procedure Laterality Date    BACK SURGERY      CATARACT EXTRACTION      bilat     COLONOSCOPY      CORONARY ARTERY BYPASS GRAFT      x4     CYST REMOVAL      right hand ganglion cyst     ENDOSCOPY      THROAT SURGERY      x2    TONSILLECTOMY         Immunization History:  Influenza:   Pneumococcal: No  Tetanus: No      Social History:   Tobacco:   Social History     Tobacco Use   Smoking Status Former Smoker    Packs/day: 2.00    Years: 40.00    Pack years: 80.00    Types: Cigarettes    Quit date:     Years since quittin.8   Smokeless Tobacco Former User    Types: Chew    Quit date:       Alcohol:     Social History     Substance and Sexual Activity   Alcohol Use Yes    Frequency: Never    Comment: social          Physical Exam:/88 (BP Location: Right arm, Patient Position: Sitting)   Pulse 56   Temp 98.1 °F (36.7 °C) (Temporal)   Resp 16   Ht 157.5 cm (62\")   Wt 108 kg (238 lb 5.1 oz)   SpO2 93%   BMI 43.59 kg/m²       General Appearance:    Alert, cooperative, no distress, appears stated " age   Head:    Normocephalic, without obvious abnormality, atraumatic   Lungs:     Clear to auscultation bilaterally, respirations unlabored    Heart:   Regular rate and rhythm, S1 and S2 normal, no murmur, rub    or gallop    Abdomen:    Soft without tenderness   Breast Exam:    deferred   Genitalia:    deferred   Extremities:   Extremities normal, atraumatic, no cyanosis or edema   Skin:   Skin color, texture, turgor normal, no rashes or lesions   Neurologic:   Grossly intact     Results Review:     LABS:  Lab Results   Component Value Date    WBC 6.33 11/02/2020    HGB 12.9 (L) 11/02/2020    HCT 41.6 11/02/2020    MCV 99.8 (H) 11/02/2020     (L) 11/02/2020    NEUTROABS 4.54 11/02/2020    GLUCOSE 107 (H) 11/02/2020    BUN 9 11/02/2020    CREATININE 0.80 11/02/2020    EGFRIFNONA 98 11/02/2020    EGFRIFAFRI 119 11/02/2020     11/02/2020    K 4.2 11/02/2020     11/02/2020    CO2 25.0 11/02/2020    CALCIUM 9.0 11/02/2020    ALBUMIN 4.10 11/02/2020    AST 11 11/02/2020    ALT 7 11/02/2020    BILITOT 0.2 11/02/2020     SARS-CoV-2 JASON   Not Detected Not Detected      ECG 12 Lead  Order: [id number]  Status:  Final result   Visible to patient:  No (not released) Next appt:  None  Component   Ref Range & Units 11/2/20 1357   QT Interval   ms 448    QTC Interval   ms 408       Narrative & Impression    Test Reason : htn  Blood Pressure : **/** mmHG  Vent. Rate : 050 BPM     Atrial Rate : 050 BPM     P-R Int : 178 ms          QRS Dur : 098 ms      QT Int : 448 ms       P-R-T Axes : 045 -02 034 degrees     QTc Int : 408 ms     Sinus bradycardia  Low voltage QRS  Borderline ECG  No previous ECGs available  Confirmed by JENNIFER COOLEY MD ([id number]) on 11/2/2020 5:45:14 PM             RADIOLOGY:  Study Result    EXAMINATION: XR CHEST 2 VW- 11/02/2020     INDICATION: Hypertension     COMPARISON: NONE     FINDINGS: Two-view chest reveals patient to be status post median  sternotomy. Cardiac and mediastinal silhouettes  are within normal  limits. Degenerative changes seen within the spine. No pleural effusion  or pneumothorax. No focal parenchymal opacification present. Pulmonary  vascularity is within normal limits. Degenerative changes seen within  the spine.        IMPRESSION:  Underlying chronic and emphysematous changes seen within the  lung fields bilaterally with no evidence of acute parenchymal disease.       I reviewed the patient's new clinical results.    Cancer Staging (if applicable)  Cancer Patient: __ yes __no __unknown; If yes, clinical stage T:__ N:__M:__, stage group or __N/A      Impression: Cholelithiasis      Plan: Laparoscopic cholecystectomy with EGD and PEG placement      Fela Blanchard, APRN   11/5/2020   13:17 EST

## 2020-11-05 NOTE — OP NOTE
Operative Report    Patient Name:  Sonido Villaseñor  YOB: 1960  [id number]  11/5/2020      PREOPERATIVE DIAGNOSIS:   1.  Symptomatic cholelithiasis  2.  Hiatal hernia and dysphagia      POSTOPERATIVE DIAGNOSIS: Same        PROCEDURE PERFORMED:     1. Laparoscopic cholecystectomy with intra-operative cholangiography  2.  EGD with PEG placement       SURGEON: Kevin Fajardo MD      ASSISTANT: Ibeth Schwab MD       SPECIMENS: Gallbladder and contents       ANESTHESIA: General.       FINDINGS:     1. Gallbladder in standard positioning    2. Intra-operative cholangiogram demonstrated excellent filling of the cystic, common, right and left hepatic ducts with good flow of contrast into the duodenum without retained stone or filling defect     3.  PEG placed at the 3 cm level       INDICATIONS:      The patient is a 60 y.o. male with a history of abdominal pain, concerning for symptomatic cholelithiasis, hiatal hernia, and dysphagia. Pre-operative imaging including barium swallow and CT scan confirmed the diagnosis. The risks and benefits of Laparoscopic cholecystectomy with cholangiography and EGD with PEG placement were discussed with the patient and their family and they agree to proceed.        DESCRIPTION OF PROCEDURE:     After obtaining informed consent, the patient was taken to the operating room and placed in the supine position. After appropriate DVT and antibiotic prophylaxis, general anesthesia was induced. The abdomen was prepped and draped in standard sterile fashion, and after infiltrating the skin with local anesthetic, a 12mm skin incision was made 20 cm inferior to the xiphoid process in the midline.  An optically guided trocar was advanced without difficulty into the abdominal cavity.  The abdomen was insufflated with carbon dioxide gas to a pressure of 15 mmHg, and a laparoscope advanced through the trocar and the abdominal contents were inspected. There was no evidence of bowel, bladder,  "or visceral injury with entrance of the trocar. At this point, after infiltrating the skin with local anesthetic, a standard laparoscopic cholecystectomy trocar placement schema was followed.     The gallbladder was grasped and elevated superiorly. Using meticulous blunt dissection , the cystic duct and cystic artery were bluntly dissected free of other structures and clearly identified using the \"Critical View\" technique. The cystic artery was then clipped twice proximally and once distally, and divided between the clips.     The cystic duct was then clipped at its junction with the infundibulum of the gallbladder, and transected across 50% of its circumference. A cholangiogram catheter was then placed within the duct, and on-table cholangiography under fluoroscopy was obtained. There was excellent filling of the cystic, common, right and left hepatic ducts with good flow of contrast into the duodenum without retained stone or filling defect  . The cholangiogram catheter was then removed, and the cystic duct was ligated using a 2-0 silk suture tied laparoscopically,  reinforced with hemoclips, and divided.     The gallbladder was then dissected free of the gallbladder fossa using a combination of electrocautery and blunt dissection . There was a small posterior branch of the artery that was clipped and divided . The gallbladder was then placed in an Endo Catch bag, and removed from the inferiormost trocar site. It was inspected on the back table, correlated with intra-operative findings, and passed off as specimen.     The right upper quadrant was then inspected. The cystic duct and cystic artery stumps were intact without bleeding or biliary leak. The right upper quadrant was irrigated with saline until clear. The abdomen was deflated and reinsufflated to make sure pneumoperitoneum was not tamponading any bleeding and there was none.  Using a 0 Vicryl suture and a laparoscopic suture passer, a figure-of-eight " "suture was placed around the inferior most trocar site fascia.  The abdomen was irrigated with saline until clear.  No additional bleeding was noted.    Attention was then turned toward performing the EGD PEG.  Laparoscopically the stomach was inspected and had evidence of chronic adhesions from his prior PEG placement.  The endoscope was advanced into the mouth and into the esophagus without difficulty. It was advanced into the stomach, and into the duodenum, which was normal . The scope was then returned to the stomach, and the stomach was maximally insufflated. An appropriate site for PEG placement through his previous PEG scar was then determined by palpation, transillumination, and the \"safe track\" needle technique. This area was prepped and draped in standard sterile fashion, and after infiltrating the skin with local anesthetic, a 7mm skin incision was made in this location. A needle was advanced through this incision and into the stomach under endoscopic guidance. A wire was then placed through the needle, grasped with the endoscope, and brought out through the mouth. At this point, using the Ponsky Pull technique, a 20 Nepalese PEG tube was brought through the abdominal wall in this location. The endoscope was again advanced through the mouth and esophagus and into the stomach, where the PEG bumper could be seen abutting the anterior gastric wall in standard fashion without bleeding. The endoscope was then used to desufflate the stomach, and removed from the patient's mouth without difficulty.      The abdomen was again irrigated with saline until clear, and all trocars removed under direct and laparoscopic visualization. The fascia at the inferiormost incision  was closed using the previously placed 0 Vicryl suture. The wounds were irrigated with normal saline, and closed in each area using absorbable subcuticular suture. The incisions were dressed in standard sterile fashion and covered with dry dressings. " The PEG tube was secured at the 3 cm level, with the bumper at the  3.5 cm level. The patient recovered from anesthesia, was extubated in the operating room, and transferred to the PACU in stable condition.  All sponge and needle counts were correct times two at the completion of the procedure. There were no immediate complications.       Kevin Fajardo MD  11/5/2020  15:28 EST

## 2020-11-05 NOTE — ANESTHESIA PREPROCEDURE EVALUATION
Anesthesia Evaluation     Patient summary reviewed and Nursing notes reviewed   history of anesthetic complications: difficult airway  NPO Solid Status: > 8 hours  NPO Liquid Status: > 8 hours           Airway   Mallampati: III  TM distance: >3 FB  Neck ROM: limited  Possible difficult intubation  Dental      Pulmonary - normal exam   (+) COPD, shortness of breath, sleep apnea,   Cardiovascular - normal exam    (+) hypertension, CABG,       Neuro/Psych  GI/Hepatic/Renal/Endo    (+)  hiatal hernia, GERD,      Musculoskeletal     Abdominal    Substance History      OB/GYN          Other   arthritis,                    Anesthesia Plan    ASA 3     general   (Glidescope)  intravenous induction     Anesthetic plan, all risks, benefits, and alternatives have been provided, discussed and informed consent has been obtained with: patient.    Plan discussed with CRNA.

## 2020-11-05 NOTE — DISCHARGE PLACEMENT REQUEST
"[facility] Contact [place] [phone]    Van Villaseñor (60 y.o. Male)     Date of Birth Social Security Number Address Home Phone MRN    1960  [address]   [address] [facility] [address] [phone] [id number]    Nondenominational Marital Status          Sikhism        Admission Date Admission Type Admitting Provider Attending Provider Department, Room/Bed    20 Elective Jovanny Fajardo MD Shane, Matthew D., MD [facility] OR, CAN OR/NONE    Discharge Date Discharge Disposition Discharge Destination                       Attending Provider: Jovanny Fajardo MD    Allergies: Cefaclor, Ceftriaxone    Isolation: None   Infection: None   Code Status: Not on file    Ht: 157.5 cm (62\")   Wt: 108 kg (238 lb 5.1 oz)    Admission Cmt: None   Principal Problem: None                Active Insurance as of 2020     Primary Coverage     Payor Plan Insurance Group Employer/Plan Group    HUMANA MEDICARE REPLACEMENT HUMANA MEDICARE REPLACEMENT [id number]     Payor Plan Address Payor Plan Phone Number Payor Plan Fax Number Effective Dates    [address] [address] [phone]  2018 - None Entered    [facility] [address]       Subscriber Name Subscriber Birth Date Member ID       VAN VILLASEÑOR 1960 [id number]                 Emergency Contacts      (Rel.) Home Phone Work Phone Mobile Phone    ADRIANO VILLASEÑOR (Spouse) [phone] -- [phone]        [address] [address]  Phone:  [phone]  Fax:  [phone] Date: 2020      Ambulatory Referral to Home Health     Patient:  Van Villaseñor MRN:  [id number]   [address]   [facility] [place] [address] :  1960  SSN:    Phone: [phone] Sex:  M      INSURANCE PAYOR PLAN GROUP # SUBSCRIBER ID   Primary:    HUMANA MEDICARE REPLACEMENT [id number]      Referring Provider Information:  JOVANNY FAJARDO Phone: [phone] Fax: [phone]      Referral " Information:   # Visits:  1 Referral Type: Home Health [42]   Urgency:  Routine Referral Reason: Specialty Services Required   Start Date: Nov 5, 2020 End Date:  To be determined by Insurer   Diagnosis: Dysphagia, unspecified type (R13.10 [ICD-10-CM] 787.20 [ICD-9-CM])      Refer to Dept:   Refer to Provider:   Refer to Facility:       Face to Face Visit Date: 11/5/2020  Follow-up provider for Plan of Care? I treated the patient in an acute care facility and will not continue treatment after discharge.  Follow-up provider: AGNIESZKA MAYA [[id number]]  Reason/Clinical Findings: PEG placement for feeding  Describe mobility limitations that make leaving home difficult: COPD with SOA  Nursing/Therapeutic Services Requested: Skilled Nursing  Skilled nursing orders: Enteral therapy (Teach PEG feedings, evaluate S/S aspiration)  Frequency: 1 Week 1     This document serves as a request of services and does not constitute Insurance authorization or approval of services.  To determine eligibility, please contact the members Insurance carrier to verify and review coverage.     If you have medical questions regarding this request for services. Please contact [facility] OR at [phone] during normal business hours.       Verbal Order Mode: Verbal with readback   Authorizing Provider: Kevin Fajardo MD  Authorizing Provider's NPI: [id number]     Order Entered By: Paige Whitley RN 11/5/2020  2:48 PM                 History & Physical      Fela Blanchard APRN at 11/05/20 1317     Attestation with edits by Kevin Fajardo MD at 11/05/20 1327      I agree with the updated history and physical.    Kevin Fajardo MD                        Pre-Op H&P  Sonido Villaseñor  [id number]  1960      Chief complaint: Gallstones, abdominal pain      Subjective:  Patient is a 60 y.o.male presents for scheduled surgery by Dr. Fajardo.  He anticipates a laparoscopic cholecystectomy today.  He said he has had issues with  his gallbladder for about a year now.  He has intermittent abdominal pain, nausea and vomiting.  He also has evidence of a paraesophageal hernia.  He is a poor surgical candidate for definitive management, and in addition has had difficulties with dysphagia with aspiration even of thin liquids.  We have therefore have decided to proceed with combination laparoscopic cholecystectomy, as well as EGD and PEG placement.      Review of Systems:  Constitutional-- No fever, chills or sweats. No fatigue.  CV-- + chest pain and palpitation  Resp-- No cough, hemoptysis. +SOB  GI- + nausea and vomiting, pain  Skin--No rashes or lesions      Allergies:   Allergies   Allergen Reactions   • Cefaclor Anaphylaxis   • Ceftriaxone Rash     Latex: No  Contrast Dye: No      Home Meds:  Medications Prior to Admission   Medication Sig Dispense Refill Last Dose   • amoxicillin (AMOXIL) 875 MG tablet Take 875 mg by mouth 2 (Two) Times a Day. 10 days -  Started it 30th 11/4/2020 at 2000   • aspirin 81 MG EC tablet Take 81 mg by mouth Daily.   11/4/2020 at 2300   • butalbital-acetaminophen-caffeine (FIORICET, ESGIC) -40 MG per tablet Take 1 tablet by mouth 2 (two) times a day. No more than 2-3 times per week   11/4/2020 at 1530   • Cholecalciferol (Vitamin D) 50 MCG (2000 UT) tablet Take 2,000 Units by mouth Daily.   11/5/2020 at 0730   • escitalopram (LEXAPRO) 20 MG tablet Take 20 mg by mouth 2 (two) times a day.   11/5/2020 at 0730   • esomeprazole (nexIUM) 40 MG capsule Take 40 mg by mouth Every Morning Before Breakfast.   11/5/2020 at 0730   • ezetimibe (ZETIA) 10 MG tablet Take 10 mg by mouth Every Night.   11/4/2020 at 2030   • gabapentin (NEURONTIN) 400 MG capsule Take 600 mg by mouth 3 (Three) Times a Day.   11/5/2020 at 0730   • levothyroxine (SYNTHROID, LEVOTHROID) 88 MCG tablet Take 88 mcg by mouth Daily.   11/5/2020 at 0700   • linaclotide (LINZESS) 290 MCG capsule capsule Take 290 mcg by mouth Every Morning Before  Breakfast.   11/4/2020 at Unknown time   • LORazepam (ATIVAN) 2 MG tablet Take 2 mg by mouth Every 6 (Six) Hours As Needed for Anxiety.   11/5/2020 at 0730   • Melatonin 10 MG tablet Take  by mouth Every Night.   Past Week at Unknown time   • metoclopramide (REGLAN) 10 MG tablet Take 10 mg by mouth 4 (Four) Times a Day Before Meals & at Bedtime.   11/5/2020 at 0730   • metoprolol tartrate (LOPRESSOR) 25 MG tablet Take 25 mg by mouth 2 (Two) Times a Day.   11/4/2020 at 0730   • nitroglycerin (NITRODUR) 0.4 MG/HR patch Place 1 patch on the skin as directed by provider Daily.   11/5/2020 at 0730   • oxyCODONE ER (oxyCONTIN) 20 MG 12 hr tablet Take 20 mg by mouth Every 12 (Twelve) Hours.   11/5/2020 at 0730   • oxyCODONE-acetaminophen (PERCOCET)  MG per tablet Take 1 tablet by mouth Every 6 (Six) Hours As Needed for Moderate Pain .   11/5/2020 at 1030   • risperiDONE (risperDAL) 1 MG tablet Take 1 mg by mouth 2 (Two) Times a Day.   11/5/2020 at 0730   • rosuvastatin (CRESTOR) 10 MG tablet Take 10 mg by mouth Every Night.   11/5/2020 at 0730   • tiZANidine (ZANAFLEX) 4 MG tablet Take 4 mg by mouth Every 8 (Eight) Hours As Needed for Muscle Spasms.   11/5/2020 at 0730   • topiramate (TOPAMAX) 50 MG tablet Take 50 mg by mouth 2 (Two) Times a Day.   11/5/2020 at 0730   • traZODone (DESYREL) 100 MG tablet Take 50 mg by mouth 2 (two) times a day. 1/2 in morning and 1 1/2 at bed   11/5/2020 at 0730   • ferrous sulfate 325 (65 FE) MG tablet Take 325 mg by mouth Daily With Breakfast.      • fexofenadine-pseudoephedrine (ALLEGRA-D 24) 180-240 MG per 24 hr tablet Take 1 tablet by mouth Daily As Needed for Allergies.      • nitroglycerin (NITROSTAT) 0.4 MG SL tablet Place 0.4 mg under the tongue Every 5 (Five) Minutes As Needed for Chest Pain. Take no more than 3 doses in 15 minutes.            PMH:   Past Medical History:   Diagnosis Date   • ARDS (adult respiratory distress syndrome) (CMS/HCC)    • Arthritis    • Cancer  "(CMS/[facility])     throat cancer    • Chest pain    • Constipation    • COPD (chronic obstructive pulmonary disease) (CMS/[facility])    • Dizzy    • Dry skin    • Full dentures    • Fungus infection     left thumb- oral antibx use    • GERD (gastroesophageal reflux disease)    • Hard to intubate    • Headache    • Hiatal hernia     still present    • Hypertension    • Migraine    • Nausea and vomiting    • On home oxygen therapy     2-2.5 prn and nightly    • Sleep apnea     bipap machine compliant - \"suped up bipap\"     • SOBOE (shortness of breath on exertion)    • Swallowing difficulty     larger items - from radiation issue    • Wears glasses     readers     PSH:    Past Surgical History:   Procedure Laterality Date   • BACK SURGERY     • CATARACT EXTRACTION      bilat    • COLONOSCOPY     • CORONARY ARTERY BYPASS GRAFT      x4    • CYST REMOVAL      right hand ganglion cyst    • ENDOSCOPY     • THROAT SURGERY      x2   • TONSILLECTOMY         Immunization History:  Influenza:   Pneumococcal: No  Tetanus: No      Social History:   Tobacco:   Social History     Tobacco Use   Smoking Status Former Smoker   • Packs/day: 2.00   • Years: 40.00   • Pack years: 80.00   • Types: Cigarettes   • Quit date:    • Years since quittin.8   Smokeless Tobacco Former User   • Types: Chew   • Quit date:       Alcohol:     Social History     Substance and Sexual Activity   Alcohol Use Yes   • Frequency: Never    Comment: social          Physical Exam:/88 (BP Location: Right arm, Patient Position: Sitting)   Pulse 56   Temp 98.1 °F (36.7 °C) (Temporal)   Resp 16   Ht 157.5 cm (62\")   Wt 108 kg (238 lb 5.1 oz)   SpO2 93%   BMI 43.59 kg/m²       General Appearance:    Alert, cooperative, no distress, appears stated age   Head:    Normocephalic, without obvious abnormality, atraumatic   Lungs:     Clear to auscultation bilaterally, respirations unlabored    Heart:   Regular rate and rhythm, S1 and S2 normal, no " murmur, rub    or gallop    Abdomen:    Soft without tenderness   Breast Exam:    deferred   Genitalia:    deferred   Extremities:   Extremities normal, atraumatic, no cyanosis or edema   Skin:   Skin color, texture, turgor normal, no rashes or lesions   Neurologic:   Grossly intact     Results Review:     LABS:  Lab Results   Component Value Date    WBC 6.33 11/02/2020    HGB 12.9 (L) 11/02/2020    HCT 41.6 11/02/2020    MCV 99.8 (H) 11/02/2020     (L) 11/02/2020    NEUTROABS 4.54 11/02/2020    GLUCOSE 107 (H) 11/02/2020    BUN 9 11/02/2020    CREATININE 0.80 11/02/2020    EGFRIFNONA 98 11/02/2020    EGFRIFAFRI 119 11/02/2020     11/02/2020    K 4.2 11/02/2020     11/02/2020    CO2 25.0 11/02/2020    CALCIUM 9.0 11/02/2020    ALBUMIN 4.10 11/02/2020    AST 11 11/02/2020    ALT 7 11/02/2020    BILITOT 0.2 11/02/2020     SARS-CoV-2 JASON   Not Detected Not Detected      ECG 12 Lead  Order: [id number]  Status:  Final result   Visible to patient:  No (not released) Next appt:  None  Component   Ref Range & Units 11/2/20 1357   QT Interval   ms 448    QTC Interval   ms 408       Narrative & Impression    Test Reason : htn  Blood Pressure : **/** mmHG  Vent. Rate : 050 BPM     Atrial Rate : 050 BPM     P-R Int : 178 ms          QRS Dur : 098 ms      QT Int : 448 ms       P-R-T Axes : 045 -02 034 degrees     QTc Int : 408 ms     Sinus bradycardia  Low voltage QRS  Borderline ECG  No previous ECGs available  Confirmed by JENNIFER COOLEY MD ([id number]) on 11/2/2020 5:45:14 PM             RADIOLOGY:  Study Result    EXAMINATION: XR CHEST 2 VW- 11/02/2020     INDICATION: Hypertension     COMPARISON: NONE     FINDINGS: Two-view chest reveals patient to be status post median  sternotomy. Cardiac and mediastinal silhouettes are within normal  limits. Degenerative changes seen within the spine. No pleural effusion  or pneumothorax. No focal parenchymal opacification present. Pulmonary  vascularity is within normal limits.  Degenerative changes seen within  the spine.        IMPRESSION:  Underlying chronic and emphysematous changes seen within the  lung fields bilaterally with no evidence of acute parenchymal disease.       I reviewed the patient's new clinical results.    Cancer Staging (if applicable)  Cancer Patient: __ yes __no __unknown; If yes, clinical stage T:__ N:__M:__, stage group or __N/A      Impression: Cholelithiasis      Plan: Laparoscopic cholecystectomy with EGD and PEG placement      Fela LEILA Blanchard   11/5/2020   13:17 EST    Electronically signed by Kevin Fajardo MD at 11/05/20 1327       {Outbreak/Travel/Exposure Documentation......;  Question Available Choices Patient Response   Outbreak Screen: Do you currently have a new onset of the following symptoms?        Fever/Chils, Cough, Shortness of air, Loss of taste or smell, No, Unknown  No (11/05/20 1203)   Outbreak Screen: In the last 14 days, have you had contact with anyone who is ill, has show any of the symptoms listed above and/or has been diagnosis with the 2019 Novel Coronavirus? This includes any immediate household members but excludes any patients with whom you have been in contact within your normal work duties wearing proper PPE, if you are a healthcare worker.  Yes, No, Unknown              No (11/05/20 1203)   Outbreak Screen: Who was notified?    Free text  (not recorded)   Travel Screen: Have you traveled in the last month? If so, to what country have you traveled? If [place] what state? Yes, No, Unknown  List of all countries  List of all States (not recorded)  (not recorded)  (not recorded)   Infection Risk: Do you currently have the following symptoms?  (If cough is selected, the Tuberculosis Screen is performed.) Cough, Fever, Rash, No (not recorded)   Tuberculosis Screen: Do you have any of the following Tuberculosis Risks?  · Have you lived or spent time with anyone who had or may have TB?  · Have you lived in or visited any of the  following areas for more than one month: [place], [place], [place], [place] or [place], the [place] or [place]?  · Do you have HIV/AIDS?  · Have you lived in or worked in a nursing home, homeless shelter, correctional facility, or substance abuse treatment facility?   · No    If Yes do you have any of the following symptoms? Yes responses display to the right    If Yes, symptoms listed are:  Cough greater than or equal to 3 weeks, Loss of appetite, Unexplained weight loss, Night sweats, Bloody sputum or hemoptysis, Hoarseness, Fever, Fatigue, Chest pain, No (not recorded)  (not recorded)   Exposure Screen: Have you been exposed to any of these contagious diseases in the last month? Measles, Chickenpox, Meningitis, Pertussis, Whooping Cough, No (not recorded)

## 2020-11-05 NOTE — ANESTHESIA PROCEDURE NOTES
Airway  Urgency: elective    Date/Time: 11/5/2020 2:19 PM  Airway not difficult    General Information and Staff    Patient location during procedure: OR    Indications and Patient Condition  Indications for airway management: airway protection    Preoxygenated: yes  MILS not maintained throughout  Mask difficulty assessment: 1 - vent by mask    Final Airway Details  Final airway type: endotracheal airway      Successful airway: ETT  Cuffed: yes   Successful intubation technique: video laryngoscopy  Endotracheal tube insertion site: oral  Blade: Sneha  Blade size: 3  ETT size (mm): 7.0  Cormack-Lehane Classification: grade IIa - partial view of glottis  Placement verified by: chest auscultation and capnometry   Measured from: lips  ETT/EBT  to lips (cm): 20  Number of attempts at approach: 1  Assessment: lips, teeth, and gum same as pre-op and atraumatic intubation    Additional Comments  Negative epigastric sounds, Breath sound equal bilaterally with symmetric chest rise and fall

## 2020-11-05 NOTE — PROGRESS NOTES
Continued Stay Note  [facility]     Patient Name: Sonido Villaseñor  MRN: [id number]  Today's Date: 11/5/2020    Admit Date: 11/5/2020    Discharge Plan     Row Name 11/05/20 1611       Plan    Plan  SW    Plan Comments  KAILEE arranged [facility] [phone] for patient’s outpatient procedure. KAILEE spoke with patient’s wife who explained that patient was admit to room  2F: 212.        Discharge Codes    No documentation.             MARYA Cox (Kay)

## 2020-11-06 VITALS
SYSTOLIC BLOOD PRESSURE: 144 MMHG | DIASTOLIC BLOOD PRESSURE: 75 MMHG | WEIGHT: 238.32 LBS | BODY MASS INDEX: 43.86 KG/M2 | OXYGEN SATURATION: 94 % | HEART RATE: 72 BPM | HEIGHT: 62 IN | RESPIRATION RATE: 17 BRPM | TEMPERATURE: 98.4 F

## 2020-11-06 LAB
ALBUMIN SERPL-MCNC: 3.6 G/DL (ref 3.5–5.2)
ALBUMIN/GLOB SERPL: 1.6 G/DL
ALP SERPL-CCNC: 17 U/L (ref 39–117)
ALT SERPL W P-5'-P-CCNC: 23 U/L (ref 1–41)
ANION GAP SERPL CALCULATED.3IONS-SCNC: 8 MMOL/L (ref 5–15)
AST SERPL-CCNC: 28 U/L (ref 1–40)
BASOPHILS # BLD AUTO: 0 10*3/MM3 (ref 0–0.2)
BASOPHILS NFR BLD AUTO: 0 % (ref 0–1.5)
BILIRUB SERPL-MCNC: 0.3 MG/DL (ref 0–1.2)
BUN SERPL-MCNC: 11 MG/DL (ref 8–23)
BUN/CREAT SERPL: 13.9 (ref 7–25)
CALCIUM SPEC-SCNC: 8.6 MG/DL (ref 8.6–10.5)
CHLORIDE SERPL-SCNC: 104 MMOL/L (ref 98–107)
CO2 SERPL-SCNC: 27 MMOL/L (ref 22–29)
CREAT SERPL-MCNC: 0.79 MG/DL (ref 0.76–1.27)
DEPRECATED RDW RBC AUTO: 47.9 FL (ref 37–54)
EOSINOPHIL # BLD AUTO: 0.22 10*3/MM3 (ref 0–0.4)
EOSINOPHIL NFR BLD AUTO: 1.9 % (ref 0.3–6.2)
ERYTHROCYTE [DISTWIDTH] IN BLOOD BY AUTOMATED COUNT: 13.2 % (ref 12.3–15.4)
GFR SERPL CREATININE-BSD FRML MDRD: 120 ML/MIN/1.73
GFR SERPL CREATININE-BSD FRML MDRD: 99 ML/MIN/1.73
GLOBULIN UR ELPH-MCNC: 2.2 GM/DL
GLUCOSE SERPL-MCNC: 137 MG/DL (ref 65–99)
HCT VFR BLD AUTO: 36.7 % (ref 37.5–51)
HGB BLD-MCNC: 11.5 G/DL (ref 13–17.7)
IMM GRANULOCYTES # BLD AUTO: 0.04 10*3/MM3 (ref 0–0.05)
IMM GRANULOCYTES NFR BLD AUTO: 0.3 % (ref 0–0.5)
LYMPHOCYTES # BLD AUTO: 0.43 10*3/MM3 (ref 0.7–3.1)
LYMPHOCYTES NFR BLD AUTO: 3.7 % (ref 19.6–45.3)
MCH RBC QN AUTO: 31.3 PG (ref 26.6–33)
MCHC RBC AUTO-ENTMCNC: 31.3 G/DL (ref 31.5–35.7)
MCV RBC AUTO: 99.7 FL (ref 79–97)
MONOCYTES # BLD AUTO: 0.72 10*3/MM3 (ref 0.1–0.9)
MONOCYTES NFR BLD AUTO: 6.1 % (ref 5–12)
NEUTROPHILS NFR BLD AUTO: 10.34 10*3/MM3 (ref 1.7–7)
NEUTROPHILS NFR BLD AUTO: 88 % (ref 42.7–76)
NRBC BLD AUTO-RTO: 0 /100 WBC (ref 0–0.2)
PLAT MORPH BLD: NORMAL
PLATELET # BLD AUTO: 99 10*3/MM3 (ref 140–450)
PMV BLD AUTO: 12.3 FL (ref 6–12)
POTASSIUM SERPL-SCNC: 3.6 MMOL/L (ref 3.5–5.2)
PROT SERPL-MCNC: 5.8 G/DL (ref 6–8.5)
RBC # BLD AUTO: 3.68 10*6/MM3 (ref 4.14–5.8)
RBC MORPH BLD: NORMAL
SODIUM SERPL-SCNC: 139 MMOL/L (ref 136–145)
WBC # BLD AUTO: 11.75 10*3/MM3 (ref 3.4–10.8)
WBC MORPH BLD: NORMAL

## 2020-11-06 PROCEDURE — 63710000001 NITROGLYCERIN 0.4 MG/HR PATCH 24 HOUR: Performed by: SURGERY

## 2020-11-06 PROCEDURE — 25010000002 HYDROMORPHONE PER 4 MG: Performed by: SURGERY

## 2020-11-06 PROCEDURE — 63710000001 PANTOPRAZOLE 40 MG TABLET DELAYED-RELEASE: Performed by: SURGERY

## 2020-11-06 PROCEDURE — 25010000002 MORPHINE PER 10 MG: Performed by: SURGERY

## 2020-11-06 PROCEDURE — 85007 BL SMEAR W/DIFF WBC COUNT: CPT | Performed by: SURGERY

## 2020-11-06 PROCEDURE — A9270 NON-COVERED ITEM OR SERVICE: HCPCS | Performed by: SURGERY

## 2020-11-06 PROCEDURE — 94660 CPAP INITIATION&MGMT: CPT

## 2020-11-06 PROCEDURE — 63710000001 DOCUSATE SODIUM 150 MG/15ML LIQUID: Performed by: SURGERY

## 2020-11-06 PROCEDURE — 85025 COMPLETE CBC W/AUTO DIFF WBC: CPT | Performed by: SURGERY

## 2020-11-06 PROCEDURE — 63710000001 FERROUS SULFATE 325 (65 FE) MG TABLET: Performed by: SURGERY

## 2020-11-06 PROCEDURE — 63710000001 LORAZEPAM 1 MG TABLET: Performed by: SURGERY

## 2020-11-06 PROCEDURE — 63710000001 TOPIRAMATE PER 25 MG: Performed by: SURGERY

## 2020-11-06 PROCEDURE — 63710000001 OXYCODONE 10 MG TABLET EXTENDED-RELEASE 12 HOUR: Performed by: SURGERY

## 2020-11-06 PROCEDURE — 63710000001 ESCITALOPRAM 20 MG TABLET: Performed by: SURGERY

## 2020-11-06 PROCEDURE — 25010000002 HEPARIN (PORCINE) PER 1000 UNITS: Performed by: SURGERY

## 2020-11-06 PROCEDURE — 63710000001 LEVOTHYROXINE 88 MCG TABLET: Performed by: SURGERY

## 2020-11-06 PROCEDURE — 25010000002 METHYLNALTREXONE 12 MG/0.6ML SOLUTION: Performed by: SURGERY

## 2020-11-06 PROCEDURE — 94799 UNLISTED PULMONARY SVC/PX: CPT

## 2020-11-06 PROCEDURE — 63710000001 RISPERIDONE 1 MG TABLET: Performed by: SURGERY

## 2020-11-06 PROCEDURE — G0378 HOSPITAL OBSERVATION PER HR: HCPCS

## 2020-11-06 PROCEDURE — 63710000001 TRAZODONE 50 MG TABLET: Performed by: SURGERY

## 2020-11-06 PROCEDURE — 63710000001 METOCLOPRAMIDE 10 MG TABLET: Performed by: SURGERY

## 2020-11-06 PROCEDURE — 63710000001 METOPROLOL TARTRATE 25 MG TABLET: Performed by: SURGERY

## 2020-11-06 PROCEDURE — 63710000001 GABAPENTIN 300 MG CAPSULE: Performed by: SURGERY

## 2020-11-06 PROCEDURE — 80053 COMPREHEN METABOLIC PANEL: CPT | Performed by: SURGERY

## 2020-11-06 PROCEDURE — 63710000001 ASPIRIN 81 MG TABLET DELAYED-RELEASE: Performed by: SURGERY

## 2020-11-06 RX ORDER — DOCUSATE SODIUM 50 MG/5 ML
100 LIQUID (ML) ORAL DAILY
Qty: 20 ML | Refills: 0 | Status: SHIPPED | OUTPATIENT
Start: 2020-11-07

## 2020-11-06 RX ORDER — OXYCODONE HCL 20 MG/1
20 TABLET, FILM COATED, EXTENDED RELEASE ORAL EVERY 12 HOURS
Qty: 6 TABLET | Refills: 0 | Status: SHIPPED | OUTPATIENT
Start: 2020-11-06

## 2020-11-06 RX ADMIN — HYDROMORPHONE HYDROCHLORIDE 0.5 MG: 1 INJECTION, SOLUTION INTRAMUSCULAR; INTRAVENOUS; SUBCUTANEOUS at 01:53

## 2020-11-06 RX ADMIN — HEPARIN SODIUM 5000 UNITS: 5000 INJECTION INTRAVENOUS; SUBCUTANEOUS at 05:56

## 2020-11-06 RX ADMIN — TRAZODONE HYDROCHLORIDE 50 MG: 50 TABLET ORAL at 08:26

## 2020-11-06 RX ADMIN — HYDROMORPHONE HYDROCHLORIDE 0.5 MG: 1 INJECTION, SOLUTION INTRAMUSCULAR; INTRAVENOUS; SUBCUTANEOUS at 03:49

## 2020-11-06 RX ADMIN — LEVOTHYROXINE SODIUM 88 MCG: 88 TABLET ORAL at 05:56

## 2020-11-06 RX ADMIN — ASPIRIN 81 MG: 81 TABLET, COATED ORAL at 08:26

## 2020-11-06 RX ADMIN — PANTOPRAZOLE SODIUM 40 MG: 40 TABLET, DELAYED RELEASE ORAL at 06:47

## 2020-11-06 RX ADMIN — HYDROMORPHONE HYDROCHLORIDE 0.5 MG: 1 INJECTION, SOLUTION INTRAMUSCULAR; INTRAVENOUS; SUBCUTANEOUS at 09:20

## 2020-11-06 RX ADMIN — DOCUSATE SODIUM 100 MG: 50 LIQUID ORAL at 08:26

## 2020-11-06 RX ADMIN — METOPROLOL TARTRATE 25 MG: 25 TABLET, FILM COATED ORAL at 08:26

## 2020-11-06 RX ADMIN — RISPERIDONE 1 MG: 1 TABLET ORAL at 08:26

## 2020-11-06 RX ADMIN — ESCITALOPRAM OXALATE 20 MG: 20 TABLET ORAL at 08:26

## 2020-11-06 RX ADMIN — OXYCODONE HYDROCHLORIDE 20 MG: 10 TABLET, FILM COATED, EXTENDED RELEASE ORAL at 08:26

## 2020-11-06 RX ADMIN — FERROUS SULFATE TAB 325 MG (65 MG ELEMENTAL FE) 325 MG: 325 (65 FE) TAB at 08:26

## 2020-11-06 RX ADMIN — SODIUM CHLORIDE, POTASSIUM CHLORIDE, SODIUM LACTATE AND CALCIUM CHLORIDE 125 ML/HR: 600; 310; 30; 20 INJECTION, SOLUTION INTRAVENOUS at 01:53

## 2020-11-06 RX ADMIN — MORPHINE SULFATE 2 MG: 2 INJECTION, SOLUTION INTRAMUSCULAR; INTRAVENOUS at 07:33

## 2020-11-06 RX ADMIN — METHYLNALTREXONE BROMIDE 4 MG: 12 INJECTION, SOLUTION SUBCUTANEOUS at 08:25

## 2020-11-06 RX ADMIN — HYDROMORPHONE HYDROCHLORIDE 0.5 MG: 1 INJECTION, SOLUTION INTRAMUSCULAR; INTRAVENOUS; SUBCUTANEOUS at 05:56

## 2020-11-06 RX ADMIN — LORAZEPAM 2 MG: 1 TABLET ORAL at 09:51

## 2020-11-06 RX ADMIN — NITROGLYCERIN 1 PATCH: 0.4 PATCH TRANSDERMAL at 08:25

## 2020-11-06 RX ADMIN — METOCLOPRAMIDE 10 MG: 10 TABLET ORAL at 06:47

## 2020-11-06 RX ADMIN — GABAPENTIN 600 MG: 300 CAPSULE ORAL at 08:26

## 2020-11-06 RX ADMIN — TOPIRAMATE 50 MG: 25 TABLET, FILM COATED ORAL at 08:26

## 2020-11-06 NOTE — PROGRESS NOTES
"Patient Name:  Sonido Villaseñor  YOB: 1960  [id number]    Surgery Post - Operative Note    Date of visit: 11/5/2020    Subjective   Subjective: Feels Ok, pain controlled.       Objective     Objective:    /75 (BP Location: Right arm, Patient Position: Lying)   Pulse 74   Temp 98.3 °F (36.8 °C) (Oral)   Resp 20   Ht 157.5 cm (62\")   Wt 108 kg (238 lb 5.1 oz)   SpO2 97%   BMI 43.59 kg/m²     CV:  Rate  regular and rhythm  regular  L:  Clear  to auscultation bilaterally   ABD:  Soft, appropriately tender. Dressings and PEG clean, dry and intact   EXT:  No cyanosis, clubbing or edema         Assessment/Plan     Assessment/ Plan: Doing well after Lap CCY, PEG. Continue Pulmonary toilet    Hospital Problem List     * (Principal) Symptomatic cholelithiasis    Coronary arteriosclerosis in native artery        Chronic obstructive lung disease (CMS/HCC)        Diaphragmatic hernia    Dysphagia    Hypertensive disorder        Obstructive sleep apnea syndrome    Primary malignant neoplasm of supraglottis (CMS/HCC)    Restrictive lung disease due to kyphoscoliosis    Squamous cell carcinoma of larynx (CMS/HCC)              Kevin Fajardo MD  11/5/2020  21:37 EST    "

## 2020-11-06 NOTE — PLAN OF CARE
Goal Outcome Evaluation:  Plan of Care Reviewed With: patient  Progress: improving  Outcome Summary: Patient has had a decent shift, sleeping on and off tonight. VSS, and patient has been alert and oriented times four. Patient has asked for pain medications multiple times tonight for pain related to his recent surgical procedure. Nursing staff will continue to monitor and assess the patient.

## 2020-11-06 NOTE — PLAN OF CARE
Problem: Adult Inpatient Plan of Care  Goal: Plan of Care Review  Outcome: Ongoing, Progressing  Flowsheets (Taken 11/6/2020 0930)  Progress: improving  Plan of Care Reviewed With: patient  Outcome Summary:   VSS on 3L NC on 2.5 L at home   constant pain 8/10 given PRNs   anticipating discharge   will continue to monitor  Goal: Patient-Specific Goal (Individualized)  Outcome: Ongoing, Progressing  Goal: Absence of Hospital-Acquired Illness or Injury  Outcome: Ongoing, Progressing  Intervention: Identify and Manage Fall Risk  Recent Flowsheet Documentation  Taken 11/6/2020 0800 by Laura Madrid RN  Safety Promotion/Fall Prevention:   activity supervised   assistive device/personal items within reach   clutter free environment maintained   fall prevention program maintained   nonskid shoes/slippers when out of bed   room organization consistent   safety round/check completed   toileting scheduled  Intervention: Prevent Skin Injury  Recent Flowsheet Documentation  Taken 11/6/2020 0800 by Laura Madrid RN  Body Position: supine  Intervention: Prevent and Manage VTE (venous thromboembolism) Risk  Recent Flowsheet Documentation  Taken 11/6/2020 0800 by Laura Madrid RN  VTE Prevention/Management:   bilateral   sequential compression devices on  Intervention: Prevent Infection  Recent Flowsheet Documentation  Taken 11/6/2020 0800 by Laura Madrid RN  Infection Prevention:   rest/sleep promoted   single patient room provided  Goal: Optimal Comfort and Wellbeing  Outcome: Ongoing, Progressing  Intervention: Provide Person-Centered Care  Recent Flowsheet Documentation  Taken 11/6/2020 0800 by Laura Madrid RN  Trust Relationship/Rapport:   care explained   emotional support provided   choices provided   questions answered   empathic listening provided   questions encouraged   thoughts/feelings acknowledged   reassurance provided  Goal: Readiness for Transition of Care  Outcome: Ongoing,  Progressing     Problem: Bleeding (Surgery Nonspecified)  Goal: Absence of Bleeding  Outcome: Ongoing, Progressing     Problem: Bowel Elimination Impaired (Surgery Nonspecified)  Goal: Effective Bowel Elimination  Outcome: Ongoing, Progressing     Problem: Infection (Surgery Nonspecified)  Goal: Absence of Infection Signs and Symptoms  Outcome: Ongoing, Progressing     Problem: Ongoing Anesthesia Effects (Surgery Nonspecified)  Goal: Anesthesia/Sedation Recovery  Outcome: Ongoing, Progressing  Intervention: Optimize Anesthesia Recovery  Recent Flowsheet Documentation  Taken 11/6/2020 0800 by Laura Madrid RN  Patient Tolerance (IS): fair  Safety Promotion/Fall Prevention:   activity supervised   assistive device/personal items within reach   clutter free environment maintained   fall prevention program maintained   nonskid shoes/slippers when out of bed   room organization consistent   safety round/check completed   toileting scheduled  Administration (IS): self-administered  Level Incentive Spirometer (mL): 500  Incentive Spirometer Predicted Level (mL): 2000  Number of Repetitions (IS): 10     Problem: Pain (Surgery Nonspecified)  Goal: Acceptable Pain Control  Outcome: Ongoing, Progressing  Intervention: Prevent or Manage Pain  Recent Flowsheet Documentation  Taken 11/6/2020 0803 by Laura Madrid RN  Pain Management Interventions: quiet environment facilitated  Taken 11/6/2020 0800 by Laura Madrid RN  Pain Management Interventions: quiet environment facilitated  Diversional Activities:   television   smartphone  Taken 11/6/2020 0733 by Laura Madrid RN  Pain Management Interventions:   see MAR   quiet environment facilitated     Problem: Postoperative Nausea and Vomiting (Surgery Nonspecified)  Goal: Nausea and Vomiting Relief  Outcome: Ongoing, Progressing     Problem: Postoperative Urinary Retention (Surgery Nonspecified)  Goal: Effective Urinary Elimination  Outcome: Ongoing, Progressing      Problem: Pain Acute  Goal: Optimal Pain Control  Outcome: Ongoing, Progressing  Intervention: Develop Pain Management Plan  Recent Flowsheet Documentation  Taken 11/6/2020 0803 by Laura Madrid RN  Pain Management Interventions: quiet environment facilitated  Taken 11/6/2020 0800 by Laura Madrid RN  Pain Management Interventions: quiet environment facilitated  Taken 11/6/2020 0733 by Laura Madrid RN  Pain Management Interventions:   see MAR   quiet environment facilitated  Intervention: Optimize Psychosocial Wellbeing  Recent Flowsheet Documentation  Taken 11/6/2020 0800 by Laura Madrid RN  Diversional Activities:   television   smartphone     Problem: Fall Injury Risk  Goal: Absence of Fall and Fall-Related Injury  Outcome: Ongoing, Progressing  Intervention: Identify and Manage Contributors to Fall Injury Risk  Recent Flowsheet Documentation  Taken 11/6/2020 0800 by Laura Madrid RN  Medication Review/Management: medications reviewed  Intervention: Promote Injury-Free Environment  Recent Flowsheet Documentation  Taken 11/6/2020 0800 by Laura Madrid RN  Safety Promotion/Fall Prevention:   activity supervised   assistive device/personal items within reach   clutter free environment maintained   fall prevention program maintained   nonskid shoes/slippers when out of bed   room organization consistent   safety round/check completed   toileting scheduled   Goal Outcome Evaluation:  Plan of Care Reviewed With: patient  Progress: improving  Outcome Summary: VSS on 3L NC on 2.5 L at home; constant pain 8/10 given PRNs; anticipating discharge; will continue to monitor

## 2020-11-06 NOTE — PROGRESS NOTES
"Patient Name:  Sonido Villaseñor  YOB: 1960  [id number]    Surgery Progress Note    Date of visit: 11/6/2020    Subjective   Subjective: Feeling OK, pain controlled. Reports coughing up a little blood         Objective     Objective:     /71 (BP Location: Right arm, Patient Position: Lying)   Pulse 70   Temp 99 °F (37.2 °C) (Oral)   Resp 18   Ht 157.5 cm (62\")   Wt 108 kg (238 lb 5.1 oz)   SpO2 96%   BMI 43.59 kg/m²     Intake/Output Summary (Last 24 hours) at 11/6/2020 0824  Last data filed at 11/6/2020 0737  Gross per 24 hour   Intake 1000 ml   Output 1475 ml   Net -475 ml       CV:  Rhythm  regular and rate regular   L:  Rhonchi to auscultation bilaterally   Abd:  Bowel sounds positive , soft, appropriately tender. Dressings c/d/i. PEG c/d/i with gastric secretions, no blood.    Ext:  No cyanosis, clubbing, edema    Recent labs that are back at this time have been reviewed.        Assessment/Plan     Assessment/ Plan:    Hospital Problem List     * (Principal) Symptomatic cholelithiasis - Stable after Lap CCY and PEG.  He and his wife have all of the tube feeding supplies and have already been seen by nutrition as an outpatient.  They are ready for discharge home.  As for his coughing up a scant amount of blood, during intubation and his endoscopy yesterday it was very clear that his pharynx was quite erythematous and friable due to his known radiation and history of head neck cancer.  This should resolve with time.  I will see him back in 4 weeks.  D/C home. No lifting > 30 lbs until RTC. May remove dressings in 24 hours, may shower at that time.      Coronary arteriosclerosis in native artery                Diaphragmatic hernia    Dysphagia    Hypertensive disorder        Obstructive sleep apnea syndrome    Primary malignant neoplasm of supraglottis (CMS/HCC)    Restrictive lung disease due to kyphoscoliosis    Squamous cell carcinoma of larynx (CMS/HCC)              Kevin Fajardo, " MD  11/6/2020  08:24 EST

## 2020-11-06 NOTE — PROGRESS NOTES
[facility] Nutrition    At request of patient's wife, Paige Whitley RN Case Management was notified via email that that the patient would like a referral to Home Health for initial assessment and monthly FU.

## 2020-11-06 NOTE — DISCHARGE PLACEMENT REQUEST
"Van Villaseñor (60 y.o. Male)     - Audra [phone]      Date of Birth Social Security Number Address Home Phone MRN    1960  [address]   [facility] [phone] [id number]    Nondenominational Marital Status          Sabianism        Admission Date Admission Type Admitting Provider Attending Provider Department, Room/Bed    11/5/20 Elective Kevin Fajardo MD  [address], S[address]/1    Discharge Date Discharge Disposition Discharge Destination        11/6/2020 Home or Self Care              Attending Provider: (none)   Allergies: Cefaclor, Ceftriaxone    Isolation: None   Infection: None   Code Status: CPR    Ht: 157.5 cm (62\")   Wt: 108 kg (238 lb 5.1 oz)    Admission Cmt: None   Principal Problem: Symptomatic cholelithiasis [K80.20]                 Active Insurance as of 11/5/2020     Primary Coverage     Payor Plan Insurance Group Employer/Plan Group    HUMANA MEDICARE REPLACEMENT HUMANA MEDICARE REPLACEMENT [id number]     Payor Plan Address Payor Plan Phone Number Payor Plan Fax Number Effective Dates    [address] [address] [phone]  1/1/2018 - None Entered    [facility] [address]       Subscriber Name Subscriber Birth Date Member ID       VAN VILLASEÑOR 1960 [id number]                 Emergency Contacts      (Rel.) Home Phone Work Phone Mobile Phone    ADRIANO VILLASEÑOR (Spouse) [phone] -- [phone]            Insurance Information                HUMANA MEDICARE REPLACEMENT/HUMANA MEDICARE REPLACEMENT Phone: [phone]    Subscriber: Van Villaseñor Subscriber#: [id number]    Group#: [id number] Precert#:              Operative/Procedure Notes (all)      Kevin Fajardo MD at 11/05/20 1432  Version 1 of 1         CHOLECYSTECTOMY LAPAROSCOPIC INTRAOPERATIVE CHOLANGIOGRAM, ESOPHAGOGASTRODUODENOSCOPY WITH PERCUTANEOUS ENDOSCOPIC GASTROSTOMY TUBE INSERTION  Progress Note    Van Villaseñor  11/5/2020    Pre-op Diagnosis:   1. " Symptomatic cholelithiasis  2. Dysphagia       Post-Op Diagnosis Codes:   Same    Procedure/CPT® Codes:        Procedure(s):  CHOLECYSTECTOMY LAPAROSCOPIC , IOC  ESOPHAGOGASTRODUODENOSCOPY WITH PERCUTANEOUS ENDOSCOPIC GASTROSTOMY TUBE INSERTION    Surgeon(s):  Kevin Fajardo MD    Anesthesia: General    Staff:   Circulator: Gail Melo RN  Physician Assistant: Minerva Diallo PA-C  Scrub Person: Rajeev Kim RN  Nursing Assistant: Moses Cristobal         Estimated Blood Loss: 50 mL    Urine Voided: * No values recorded between 11/5/2020  2:13 PM and 11/5/2020  3:26 PM *    Specimens:                Specimens     ID Source Type Tests Collected By Collected At Frozen?      A Gallbladder Tissue · TISSUE PATHOLOGY EXAM   Kevin Fajardo MD 11/5/20 0994 No     Description: GALLBLADDER                Drains:   Gastrostomy/Enterostomy Percutaneous endoscopic gastrostomy (PEG) 1 20 Fr. RUQ (Active)       Findings:   1. IOC (-)  2. 20 Fr PEG at the 3 cm level    Complications: None          Kevin Fajardo MD     Date: 11/5/2020  Time: 15:26 EST        Electronically signed by Kevin Fajardo MD at 11/05/20 1528     Kevin Fajardo MD at 11/05/20 1432  Version 1 of 1       Operative Report    Patient Name:  Sonido Villaseñor  YOB: 1960  [id number]  11/5/2020      PREOPERATIVE DIAGNOSIS:   1.  Symptomatic cholelithiasis  2.  Hiatal hernia and dysphagia      POSTOPERATIVE DIAGNOSIS: Same        PROCEDURE PERFORMED:     1. Laparoscopic cholecystectomy with intra-operative cholangiography  2.  EGD with PEG placement       SURGEON: Kevin Fajardo MD      ASSISTANT: Ibeth Schwab MD       SPECIMENS: Gallbladder and contents       ANESTHESIA: General.       FINDINGS:     1. Gallbladder in standard positioning    2. Intra-operative cholangiogram demonstrated excellent filling of the cystic, common, right and left hepatic ducts with good flow of contrast into the duodenum without retained stone  "or filling defect     3.  PEG placed at the 3 cm level       INDICATIONS:      The patient is a 60 y.o. male with a history of abdominal pain, concerning for symptomatic cholelithiasis, hiatal hernia, and dysphagia. Pre-operative imaging including barium swallow and CT scan confirmed the diagnosis. The risks and benefits of Laparoscopic cholecystectomy with cholangiography and EGD with PEG placement were discussed with the patient and their family and they agree to proceed.        DESCRIPTION OF PROCEDURE:     After obtaining informed consent, the patient was taken to the operating room and placed in the supine position. After appropriate DVT and antibiotic prophylaxis, general anesthesia was induced. The abdomen was prepped and draped in standard sterile fashion, and after infiltrating the skin with local anesthetic, a 12mm skin incision was made 20 cm inferior to the xiphoid process in the midline.  An optically guided trocar was advanced without difficulty into the abdominal cavity.  The abdomen was insufflated with carbon dioxide gas to a pressure of 15 mmHg, and a laparoscope advanced through the trocar and the abdominal contents were inspected. There was no evidence of bowel, bladder, or visceral injury with entrance of the trocar. At this point, after infiltrating the skin with local anesthetic, a standard laparoscopic cholecystectomy trocar placement schema was followed.     The gallbladder was grasped and elevated superiorly. Using meticulous blunt dissection , the cystic duct and cystic artery were bluntly dissected free of other structures and clearly identified using the \"Critical View\" technique. The cystic artery was then clipped twice proximally and once distally, and divided between the clips.     The cystic duct was then clipped at its junction with the infundibulum of the gallbladder, and transected across 50% of its circumference. A cholangiogram catheter was then placed within the duct, and " "on-table cholangiography under fluoroscopy was obtained. There was excellent filling of the cystic, common, right and left hepatic ducts with good flow of contrast into the duodenum without retained stone or filling defect  . The cholangiogram catheter was then removed, and the cystic duct was ligated using a 2-0 silk suture tied laparoscopically,  reinforced with hemoclips, and divided.     The gallbladder was then dissected free of the gallbladder fossa using a combination of electrocautery and blunt dissection . There was a small posterior branch of the artery that was clipped and divided . The gallbladder was then placed in an Endo Catch bag, and removed from the inferiormost trocar site. It was inspected on the back table, correlated with intra-operative findings, and passed off as specimen.     The right upper quadrant was then inspected. The cystic duct and cystic artery stumps were intact without bleeding or biliary leak. The right upper quadrant was irrigated with saline until clear. The abdomen was deflated and reinsufflated to make sure pneumoperitoneum was not tamponading any bleeding and there was none.  Using a 0 Vicryl suture and a laparoscopic suture passer, a figure-of-eight suture was placed around the inferior most trocar site fascia.  The abdomen was irrigated with saline until clear.  No additional bleeding was noted.    Attention was then turned toward performing the EGD PEG.  Laparoscopically the stomach was inspected and had evidence of chronic adhesions from his prior PEG placement.  The endoscope was advanced into the mouth and into the esophagus without difficulty. It was advanced into the stomach, and into the duodenum, which was normal . The scope was then returned to the stomach, and the stomach was maximally insufflated. An appropriate site for PEG placement through his previous PEG scar was then determined by palpation, transillumination, and the \"safe track\" needle technique. This " area was prepped and draped in standard sterile fashion, and after infiltrating the skin with local anesthetic, a 7mm skin incision was made in this location. A needle was advanced through this incision and into the stomach under endoscopic guidance. A wire was then placed through the needle, grasped with the endoscope, and brought out through the mouth. At this point, using the Ponsky Pull technique, a 20 German PEG tube was brought through the abdominal wall in this location. The endoscope was again advanced through the mouth and esophagus and into the stomach, where the PEG bumper could be seen abutting the anterior gastric wall in standard fashion without bleeding. The endoscope was then used to desufflate the stomach, and removed from the patient's mouth without difficulty.      The abdomen was again irrigated with saline until clear, and all trocars removed under direct and laparoscopic visualization. The fascia at the inferiormost incision  was closed using the previously placed 0 Vicryl suture. The wounds were irrigated with normal saline, and closed in each area using absorbable subcuticular suture. The incisions were dressed in standard sterile fashion and covered with dry dressings. The PEG tube was secured at the 3 cm level, with the bumper at the  3.5 cm level. The patient recovered from anesthesia, was extubated in the operating room, and transferred to the PACU in stable condition.  All sponge and needle counts were correct times two at the completion of the procedure. There were no immediate complications.       Kevin Fajardo MD  11/5/2020  15:28 EST        Electronically signed by Kevin Fajardo MD at 11/05/20 1533          Physician Progress Notes (last 24 hours) (Notes from 11/05/20 1042 through 11/06/20 1042)      Kevin Fajardo MD at 11/06/20 0824          Patient Name:  Sonido Villaseñor  YOB: 1960  [id number]    Surgery Progress Note    Date of visit:  "11/6/2020    Subjective   Subjective: Feeling OK, pain controlled. Reports coughing up a little blood        Objective     Objective:     /71 (BP Location: Right arm, Patient Position: Lying)   Pulse 70   Temp 99 °F (37.2 °C) (Oral)   Resp 18   Ht 157.5 cm (62\")   Wt 108 kg (238 lb 5.1 oz)   SpO2 96%   BMI 43.59 kg/m²     Intake/Output Summary (Last 24 hours) at 11/6/2020 0824  Last data filed at 11/6/2020 0737  Gross per 24 hour   Intake 1000 ml   Output 1475 ml   Net -475 ml       CV:  Rhythm  regular and rate regular   L:  Rhonchi to auscultation bilaterally   Abd:  Bowel sounds positive , soft, appropriately tender. Dressings c/d/i. PEG c/d/i with gastric secretions, no blood.    Ext:  No cyanosis, clubbing, edema    Recent labs that are back at this time have been reviewed.       Assessment/Plan     Assessment/ Plan:    Hospital Problem List     * (Principal) Symptomatic cholelithiasis - Stable after Lap CCY and PEG.  He and his wife have all of the tube feeding supplies and have already been seen by nutrition as an outpatient.  They are ready for discharge home.  As for his coughing up a scant amount of blood, during intubation and his endoscopy yesterday it was very clear that his pharynx was quite erythematous and friable due to his known radiation and history of head neck cancer.  This should resolve with time.  I will see him back in 4 weeks.  D/C home. No lifting > 30 lbs until RTC. May remove dressings in 24 hours, may shower at that time.      Coronary arteriosclerosis in native artery                Diaphragmatic hernia    Dysphagia    Hypertensive disorder        Obstructive sleep apnea syndrome    Primary malignant neoplasm of supraglottis (CMS/HCC)    Restrictive lung disease due to kyphoscoliosis    Squamous cell carcinoma of larynx (CMS/HCC)              Kevin Fajardo MD  11/6/2020  08:24 EST        Electronically signed by Kevin Fajardo MD at 11/06/20 0826     Kevin Fajardo " "MD ELVIN at 11/05/20 2137          Patient Name:  Sonido Villaseñor  YOB: 1960  [id number]    Surgery Post - Operative Note    Date of visit: 11/5/2020    Subjective   Subjective: Feels Ok, pain controlled.      Objective     Objective:    /75 (BP Location: Right arm, Patient Position: Lying)   Pulse 74   Temp 98.3 °F (36.8 °C) (Oral)   Resp 20   Ht 157.5 cm (62\")   Wt 108 kg (238 lb 5.1 oz)   SpO2 97%   BMI 43.59 kg/m²     CV:  Rate  regular and rhythm  regular  L:  Clear  to auscultation bilaterally   ABD:  Soft, appropriately tender. Dressings and PEG clean, dry and intact   EXT:  No cyanosis, clubbing or edema        Assessment/Plan     Assessment/ Plan: Doing well after Lap CCY, PEG. Continue Pulmonary toilet    Hospital Problem List     * (Principal) Symptomatic cholelithiasis    Coronary arteriosclerosis in native artery        Chronic obstructive lung disease (CMS/HCC)        Diaphragmatic hernia    Dysphagia    Hypertensive disorder        Obstructive sleep apnea syndrome    Primary malignant neoplasm of supraglottis (CMS/HCC)    Restrictive lung disease due to kyphoscoliosis    Squamous cell carcinoma of larynx (CMS/HCC)              Kevin Fajardo MD  11/5/2020  21:37 EST      Electronically signed by Kevin Fajardo MD at 11/05/20 2138       Consult Notes (last 24 hours) (Notes from 11/05/20 1042 through 11/06/20 1042)    No notes of this type exist for this encounter.         "

## 2020-11-06 NOTE — PLAN OF CARE
Problem: Adult Inpatient Plan of Care  Goal: Plan of Care Review  11/6/2020 0932 by Laura Madrid RN  Outcome: Met  11/6/2020 0930 by Laura Madrid RN  Outcome: Ongoing, Progressing  Flowsheets (Taken 11/6/2020 0930)  Progress: improving  Plan of Care Reviewed With: patient  Outcome Summary:   VSS on 3L NC on 2.5 L at home   constant pain 8/10 given PRNs   anticipating discharge   will continue to monitor  Goal: Patient-Specific Goal (Individualized)  11/6/2020 0932 by Laura Madrid RN  Outcome: Met  11/6/2020 0930 by Laura Madrid RN  Outcome: Ongoing, Progressing  Goal: Absence of Hospital-Acquired Illness or Injury  11/6/2020 0932 by Laura Madrid RN  Outcome: Met  11/6/2020 0930 by Laura Madrid RN  Outcome: Ongoing, Progressing  Intervention: Identify and Manage Fall Risk  Recent Flowsheet Documentation  Taken 11/6/2020 0800 by Laura Madrid RN  Safety Promotion/Fall Prevention:   activity supervised   assistive device/personal items within reach   clutter free environment maintained   fall prevention program maintained   nonskid shoes/slippers when out of bed   room organization consistent   safety round/check completed   toileting scheduled  Intervention: Prevent Skin Injury  Recent Flowsheet Documentation  Taken 11/6/2020 0800 by Laura Madrid RN  Body Position: supine  Intervention: Prevent and Manage VTE (venous thromboembolism) Risk  Recent Flowsheet Documentation  Taken 11/6/2020 0800 by Laura Madrid RN  VTE Prevention/Management:   bilateral   sequential compression devices on  Intervention: Prevent Infection  Recent Flowsheet Documentation  Taken 11/6/2020 0800 by Laura Madrid RN  Infection Prevention:   rest/sleep promoted   single patient room provided  Goal: Optimal Comfort and Wellbeing  11/6/2020 0932 by Laura Madrid RN  Outcome: Met  11/6/2020 0930 by Laura Madrid RN  Outcome: Ongoing, Progressing  Intervention: Provide  Person-Centered Care  Recent Flowsheet Documentation  Taken 11/6/2020 0800 by Laura Madrid RN  Trust Relationship/Rapport:   care explained   emotional support provided   choices provided   questions answered   empathic listening provided   questions encouraged   thoughts/feelings acknowledged   reassurance provided  Goal: Readiness for Transition of Care  11/6/2020 0932 by Laura Madrid RN  Outcome: Met  11/6/2020 0930 by Laura Madrid RN  Outcome: Ongoing, Progressing     Problem: Bleeding (Surgery Nonspecified)  Goal: Absence of Bleeding  11/6/2020 0932 by Laura Madrid RN  Outcome: Met  11/6/2020 0930 by Laura Madrid RN  Outcome: Ongoing, Progressing     Problem: Bowel Elimination Impaired (Surgery Nonspecified)  Goal: Effective Bowel Elimination  11/6/2020 0932 by Laura Madrid RN  Outcome: Met  11/6/2020 0930 by Laura Madrid RN  Outcome: Ongoing, Progressing     Problem: Infection (Surgery Nonspecified)  Goal: Absence of Infection Signs and Symptoms  11/6/2020 0932 by Laura Madrid RN  Outcome: Met  11/6/2020 0930 by Laura Madrid RN  Outcome: Ongoing, Progressing     Problem: Ongoing Anesthesia Effects (Surgery Nonspecified)  Goal: Anesthesia/Sedation Recovery  11/6/2020 0932 by Laura Madrid RN  Outcome: Met  11/6/2020 0930 by Laura Madrid RN  Outcome: Ongoing, Progressing  Intervention: Optimize Anesthesia Recovery  Recent Flowsheet Documentation  Taken 11/6/2020 0800 by Laura Madrid RN  Patient Tolerance (IS): fair  Safety Promotion/Fall Prevention:   activity supervised   assistive device/personal items within reach   clutter free environment maintained   fall prevention program maintained   nonskid shoes/slippers when out of bed   room organization consistent   safety round/check completed   toileting scheduled  Administration (IS): self-administered  Level Incentive Spirometer (mL): 500  Incentive Spirometer Predicted Level (mL):  2000  Number of Repetitions (IS): 10     Problem: Pain (Surgery Nonspecified)  Goal: Acceptable Pain Control  11/6/2020 0932 by Laura Madrid RN  Outcome: Met  11/6/2020 0930 by Laura Madrid RN  Outcome: Ongoing, Progressing  Intervention: Prevent or Manage Pain  Recent Flowsheet Documentation  Taken 11/6/2020 0803 by Laura Madrid RN  Pain Management Interventions: quiet environment facilitated  Taken 11/6/2020 0800 by Laura Madrid RN  Pain Management Interventions: quiet environment facilitated  Diversional Activities:   television   smartphone  Taken 11/6/2020 0733 by Laura Madrid RN  Pain Management Interventions:   see MAR   quiet environment facilitated     Problem: Postoperative Nausea and Vomiting (Surgery Nonspecified)  Goal: Nausea and Vomiting Relief  11/6/2020 0932 by Laura Madrid RN  Outcome: Met  11/6/2020 0930 by Laura Madrid RN  Outcome: Ongoing, Progressing     Problem: Postoperative Urinary Retention (Surgery Nonspecified)  Goal: Effective Urinary Elimination  11/6/2020 0932 by Laura Madrid RN  Outcome: Met  11/6/2020 0930 by Laura Madrid RN  Outcome: Ongoing, Progressing     Problem: Pain Acute  Goal: Optimal Pain Control  11/6/2020 0932 by Laura Madrid RN  Outcome: Met  11/6/2020 0930 by Laura Madrid RN  Outcome: Ongoing, Progressing  Intervention: Develop Pain Management Plan  Recent Flowsheet Documentation  Taken 11/6/2020 0803 by Laura Madrid RN  Pain Management Interventions: quiet environment facilitated  Taken 11/6/2020 0800 by Laura Madrid RN  Pain Management Interventions: quiet environment facilitated  Taken 11/6/2020 0733 by Laura Madrid RN  Pain Management Interventions:   see MAR   quiet environment facilitated  Intervention: Optimize Psychosocial Wellbeing  Recent Flowsheet Documentation  Taken 11/6/2020 0800 by Laura Madrid RN  Diversional Activities:   television   smartphone     Problem: Fall  Injury Risk  Goal: Absence of Fall and Fall-Related Injury  11/6/2020 0932 by Laura Madrid, RN  Outcome: Met  11/6/2020 0930 by Laura Madrid RN  Outcome: Ongoing, Progressing  Intervention: Identify and Manage Contributors to Fall Injury Risk  Recent Flowsheet Documentation  Taken 11/6/2020 0800 by Larua Madrid RN  Medication Review/Management: medications reviewed  Intervention: Promote Injury-Free Environment  Recent Flowsheet Documentation  Taken 11/6/2020 0800 by Laura Madrid RN  Safety Promotion/Fall Prevention:   activity supervised   assistive device/personal items within reach   clutter free environment maintained   fall prevention program maintained   nonskid shoes/slippers when out of bed   room organization consistent   safety round/check completed   toileting scheduled   Goal Outcome Evaluation:  Plan of Care Reviewed With: patient  Progress: improving  Outcome Summary: VSS on 3L NC on 2.5 L at home; constant pain 8/10 given PRNs; anticipating discharge; will continue to monitor

## 2020-11-06 NOTE — PROGRESS NOTES
Discharge Planning Assessment  [facility]     Patient Name: Sonido Villaseñor  MRN: [id number]  Today's Date: 11/6/2020    Admit Date: 11/5/2020    Discharge Needs Assessment     Row Name 11/06/20 1032       Living Environment    Lives With  spouse pt resides in [facility]    Name(s) of Who Lives With Patient  wife Marce Villaseñor    Current Living Arrangements  home/apartment/condo    Primary Care Provided by  spouse/significant other    Provides Primary Care For  no one, unable/limited ability to care for self    Family Caregiver if Needed  spouse    Family Caregiver Names  marce    Quality of Family Relationships  helpful;involved;supportive    Able to Return to Prior Arrangements  yes       Resource/Environmental Concerns    Resource/Environmental Concerns  none       Transition Planning    Patient/Family Anticipates Transition to  home with help/services;home with family    Patient/Family Anticipated Services at Transition  home health care    Transportation Anticipated  family or friend will provide       Discharge Needs Assessment    Readmission Within the Last 30 Days  no previous admission in last 30 days    Equipment Currently Used at Home  other (see comments);bipap;[facility]    Concerns to be Addressed  discharge planning    Anticipated Changes Related to Illness  none    Equipment Needed After Discharge  none    Outpatient/Agency/Support Group Needs  homecare agency    Discharge Facility/Level of Care Needs  home with home health    Provided Post Acute Provider List?  N/A    N/A Provider List Comment  has used [facility] in the past    Patient's Choice of Community Agency(s)  [facility]        Discharge Plan     Row Name 11/06/20 1034       Plan    Plan  home with home health    Plan Comments  CM spoke with pt and wife Marce at bedside. Pt resides in [facility] and requires assistance with adls. Pt has home oxygen and Trilogy machine through  [employer]. Pt has used [facility] in the past and has requested their services again. KAILEE Rowe arranged home health services yesterday. GABRIELA spoke with Jackelin at [facility] and updated progress notes faxed to [phone] per request.  Pt has seen nutritionist outpatient prior to surgery yesterday for consultation in regards to PEG tube. Pt and wife deny needs at this time and plan to discharge home today.    Final Discharge Disposition Code  06 - home with home health care        Continued Care and Services - Discharged on 11/6/2020 Admission date: 11/5/2020 - Discharge disposition: Home or Self Care    Home Medical Care Coordination complete    Service Provider Request Status Selected Services Address Phone Fax    [facility]   Selected Home Health Services [address] [place] [id number] [phone]              Expected Discharge Date and Time     Expected Discharge Date Expected Discharge Time    Nov 6, 2020         Demographic Summary     Row Name 11/06/20 1031       General Information    Referral Source  admission list    Reason for Consult  discharge planning    Preferred Language  English     Used During This Interaction  no    General Information Comments  PCP_ Jorge Fernandez       Contact Information    Permission Granted to Share Info With      Contact Information Comments  [phone] or [phone]        Functional Status     Row Name 11/06/20 1031       Functional Status    Usual Activity Tolerance  moderate    Current Activity Tolerance  fair       Functional Status, IADL    Medications  assistive person    Meal Preparation  completely dependent    Housekeeping  completely dependent    Laundry  completely dependent    Shopping  completely dependent    IADL Comments  pt has assistance with adls from his wife       Employment/    Employment/ Comments  pt has [employer]  Medicare, denies concerns or disruption in coverage. Pt has prescription drug coverage and denies issues obtaining or affording current medications.        Psychosocial    No documentation.       Abuse/Neglect    No documentation.       Legal    No documentation.       Substance Abuse    No documentation.       Patient Forms    No documentation.           Audra Bhagat RN

## 2020-11-07 ENCOUNTER — NURSE TRIAGE (OUTPATIENT)
Dept: CALL CENTER | Facility: HOSPITAL | Age: 60
End: 2020-11-07

## 2020-11-07 ENCOUNTER — APPOINTMENT (OUTPATIENT)
Dept: CT IMAGING | Facility: HOSPITAL | Age: 60
End: 2020-11-07

## 2020-11-07 ENCOUNTER — APPOINTMENT (OUTPATIENT)
Dept: GENERAL RADIOLOGY | Facility: HOSPITAL | Age: 60
End: 2020-11-07

## 2020-11-07 ENCOUNTER — HOSPITAL ENCOUNTER (OUTPATIENT)
Facility: HOSPITAL | Age: 60
Setting detail: OBSERVATION
Discharge: HOME | End: 2020-11-11
Attending: EMERGENCY MEDICINE | Admitting: INTERNAL MEDICINE

## 2020-11-07 DIAGNOSIS — I10 HYPERTENSION, UNSPECIFIED TYPE: ICD-10-CM

## 2020-11-07 DIAGNOSIS — R13.10 DYSPHAGIA, UNSPECIFIED TYPE: ICD-10-CM

## 2020-11-07 DIAGNOSIS — C32.9 SQUAMOUS CELL CARCINOMA OF LARYNX (HCC): ICD-10-CM

## 2020-11-07 DIAGNOSIS — R10.84 GENERALIZED ABDOMINAL PAIN: ICD-10-CM

## 2020-11-07 DIAGNOSIS — R50.82 POST-PROCEDURAL FEVER: ICD-10-CM

## 2020-11-07 DIAGNOSIS — G47.33 OBSTRUCTIVE SLEEP APNEA SYNDROME: ICD-10-CM

## 2020-11-07 DIAGNOSIS — G89.4 CHRONIC PAIN SYNDROME: ICD-10-CM

## 2020-11-07 DIAGNOSIS — J18.9 POST-OP PNEUMONIA: Primary | ICD-10-CM

## 2020-11-07 DIAGNOSIS — J95.89 POST-OP PNEUMONIA: Primary | ICD-10-CM

## 2020-11-07 LAB
ALBUMIN SERPL-MCNC: 3.3 G/DL (ref 3.5–5.2)
ALBUMIN/GLOB SERPL: 1 G/DL
ALP SERPL-CCNC: 18 U/L (ref 39–117)
ALT SERPL W P-5'-P-CCNC: 18 U/L (ref 1–41)
ANION GAP SERPL CALCULATED.3IONS-SCNC: 11 MMOL/L (ref 5–15)
AST SERPL-CCNC: 21 U/L (ref 1–40)
BACTERIA UR QL AUTO: NORMAL /HPF
BASOPHILS # BLD AUTO: 0.01 10*3/MM3 (ref 0–0.2)
BASOPHILS NFR BLD AUTO: 0.1 % (ref 0–1.5)
BILIRUB SERPL-MCNC: 0.3 MG/DL (ref 0–1.2)
BILIRUB UR QL STRIP: NEGATIVE
BUN SERPL-MCNC: 12 MG/DL (ref 8–23)
BUN/CREAT SERPL: 14.3 (ref 7–25)
CALCIUM SPEC-SCNC: 8.7 MG/DL (ref 8.6–10.5)
CHLORIDE SERPL-SCNC: 100 MMOL/L (ref 98–107)
CLARITY UR: CLEAR
CO2 SERPL-SCNC: 25 MMOL/L (ref 22–29)
COLOR UR: YELLOW
CREAT SERPL-MCNC: 0.84 MG/DL (ref 0.76–1.27)
CYTO UR: NORMAL
D-LACTATE SERPL-SCNC: 1.5 MMOL/L (ref 0.5–2)
DEPRECATED RDW RBC AUTO: 48 FL (ref 37–54)
EOSINOPHIL # BLD AUTO: 0.02 10*3/MM3 (ref 0–0.4)
EOSINOPHIL NFR BLD AUTO: 0.3 % (ref 0.3–6.2)
ERYTHROCYTE [DISTWIDTH] IN BLOOD BY AUTOMATED COUNT: 13 % (ref 12.3–15.4)
GFR SERPL CREATININE-BSD FRML MDRD: 112 ML/MIN/1.73
GFR SERPL CREATININE-BSD FRML MDRD: 93 ML/MIN/1.73
GLOBULIN UR ELPH-MCNC: 3.2 GM/DL
GLUCOSE SERPL-MCNC: 103 MG/DL (ref 65–99)
GLUCOSE UR STRIP-MCNC: NEGATIVE MG/DL
HCT VFR BLD AUTO: 32.7 % (ref 37.5–51)
HGB BLD-MCNC: 10.2 G/DL (ref 13–17.7)
HGB UR QL STRIP.AUTO: ABNORMAL
HOLD SPECIMEN: NORMAL
HYALINE CASTS UR QL AUTO: NORMAL /LPF
IMM GRANULOCYTES # BLD AUTO: 0.05 10*3/MM3 (ref 0–0.05)
IMM GRANULOCYTES NFR BLD AUTO: 0.7 % (ref 0–0.5)
KETONES UR QL STRIP: ABNORMAL
LAB AP CASE REPORT: NORMAL
LAB AP CLINICAL INFORMATION: NORMAL
LEUKOCYTE ESTERASE UR QL STRIP.AUTO: NEGATIVE
LIPASE SERPL-CCNC: 10 U/L (ref 13–60)
LYMPHOCYTES # BLD AUTO: 0.67 10*3/MM3 (ref 0.7–3.1)
LYMPHOCYTES NFR BLD AUTO: 8.9 % (ref 19.6–45.3)
MCH RBC QN AUTO: 31 PG (ref 26.6–33)
MCHC RBC AUTO-ENTMCNC: 31.2 G/DL (ref 31.5–35.7)
MCV RBC AUTO: 99.4 FL (ref 79–97)
MONOCYTES # BLD AUTO: 0.39 10*3/MM3 (ref 0.1–0.9)
MONOCYTES NFR BLD AUTO: 5.2 % (ref 5–12)
NEUTROPHILS NFR BLD AUTO: 6.37 10*3/MM3 (ref 1.7–7)
NEUTROPHILS NFR BLD AUTO: 84.8 % (ref 42.7–76)
NITRITE UR QL STRIP: NEGATIVE
NRBC BLD AUTO-RTO: 0 /100 WBC (ref 0–0.2)
PATH REPORT.FINAL DX SPEC: NORMAL
PATH REPORT.GROSS SPEC: NORMAL
PH UR STRIP.AUTO: 7.5 [PH] (ref 5–8)
PLATELET # BLD AUTO: 84 10*3/MM3 (ref 140–450)
PMV BLD AUTO: 12 FL (ref 6–12)
POTASSIUM SERPL-SCNC: 3.5 MMOL/L (ref 3.5–5.2)
PROT SERPL-MCNC: 6.5 G/DL (ref 6–8.5)
PROT UR QL STRIP: ABNORMAL
RBC # BLD AUTO: 3.29 10*6/MM3 (ref 4.14–5.8)
RBC # UR: NORMAL /HPF
REF LAB TEST METHOD: NORMAL
SODIUM SERPL-SCNC: 136 MMOL/L (ref 136–145)
SP GR UR STRIP: 1.01 (ref 1–1.03)
SQUAMOUS #/AREA URNS HPF: NORMAL /HPF
UROBILINOGEN UR QL STRIP: ABNORMAL
WBC # BLD AUTO: 7.51 10*3/MM3 (ref 3.4–10.8)
WBC UR QL AUTO: NORMAL /HPF
WHOLE BLOOD HOLD SPECIMEN: NORMAL
WHOLE BLOOD HOLD SPECIMEN: NORMAL

## 2020-11-07 PROCEDURE — 81001 URINALYSIS AUTO W/SCOPE: CPT | Performed by: PHYSICIAN ASSISTANT

## 2020-11-07 PROCEDURE — 71045 X-RAY EXAM CHEST 1 VIEW: CPT

## 2020-11-07 PROCEDURE — 85025 COMPLETE CBC W/AUTO DIFF WBC: CPT | Performed by: PHYSICIAN ASSISTANT

## 2020-11-07 PROCEDURE — 25010000002 IOPAMIDOL 61 % SOLUTION: Performed by: EMERGENCY MEDICINE

## 2020-11-07 PROCEDURE — 25010000002 MORPHINE PER 10 MG: Performed by: EMERGENCY MEDICINE

## 2020-11-07 PROCEDURE — 25010000002 ONDANSETRON PER 1 MG: Performed by: PHYSICIAN ASSISTANT

## 2020-11-07 PROCEDURE — 83605 ASSAY OF LACTIC ACID: CPT | Performed by: PHYSICIAN ASSISTANT

## 2020-11-07 PROCEDURE — 74177 CT ABD & PELVIS W/CONTRAST: CPT

## 2020-11-07 PROCEDURE — 84145 PROCALCITONIN (PCT): CPT | Performed by: NURSE PRACTITIONER

## 2020-11-07 PROCEDURE — 99285 EMERGENCY DEPT VISIT HI MDM: CPT

## 2020-11-07 PROCEDURE — 83735 ASSAY OF MAGNESIUM: CPT | Performed by: NURSE PRACTITIONER

## 2020-11-07 PROCEDURE — 80053 COMPREHEN METABOLIC PANEL: CPT | Performed by: PHYSICIAN ASSISTANT

## 2020-11-07 PROCEDURE — 83690 ASSAY OF LIPASE: CPT | Performed by: PHYSICIAN ASSISTANT

## 2020-11-07 PROCEDURE — 87040 BLOOD CULTURE FOR BACTERIA: CPT | Performed by: PHYSICIAN ASSISTANT

## 2020-11-07 PROCEDURE — 96375 TX/PRO/DX INJ NEW DRUG ADDON: CPT

## 2020-11-07 RX ORDER — MORPHINE SULFATE 4 MG/ML
4 INJECTION, SOLUTION INTRAMUSCULAR; INTRAVENOUS ONCE
Status: COMPLETED | OUTPATIENT
Start: 2020-11-07 | End: 2020-11-07

## 2020-11-07 RX ORDER — ACETAMINOPHEN 500 MG
500 TABLET ORAL ONCE
Status: COMPLETED | OUTPATIENT
Start: 2020-11-07 | End: 2020-11-07

## 2020-11-07 RX ORDER — SODIUM CHLORIDE 0.9 % (FLUSH) 0.9 %
10 SYRINGE (ML) INJECTION AS NEEDED
Status: DISCONTINUED | OUTPATIENT
Start: 2020-11-07 | End: 2020-11-11 | Stop reason: HOSPADM

## 2020-11-07 RX ORDER — ONDANSETRON 2 MG/ML
4 INJECTION INTRAMUSCULAR; INTRAVENOUS ONCE
Status: COMPLETED | OUTPATIENT
Start: 2020-11-07 | End: 2020-11-07

## 2020-11-07 RX ADMIN — IOPAMIDOL 95 ML: 612 INJECTION, SOLUTION INTRAVENOUS at 23:27

## 2020-11-07 RX ADMIN — ONDANSETRON 4 MG: 2 INJECTION INTRAMUSCULAR; INTRAVENOUS at 20:46

## 2020-11-07 RX ADMIN — ACETAMINOPHEN 500 MG: 500 TABLET, FILM COATED ORAL at 20:46

## 2020-11-07 RX ADMIN — MORPHINE SULFATE 4 MG: 4 INJECTION, SOLUTION INTRAMUSCULAR; INTRAVENOUS at 20:45

## 2020-11-07 NOTE — TELEPHONE ENCOUNTER
"They spoke to  On call/Surgeon this AM, about extra pain meds that was ordered, afraid would over do, and would not reorder them, now pt. Running temp 100.8, and pain rated 9, taking him back to ER    Reason for Disposition  • [1] SEVERE post-op pain (e.g., excruciating, pain scale 8-10) AND [2] not controlled with pain medications    Additional Information  • Negative: Sounds like a life-threatening emergency to the triager  • Negative: Chest pain  • Negative: Difficulty breathing  • Negative: Acting confused (e.g., disoriented, slurred speech) or excessively sleepy  • Negative: Surgical incision symptoms and questions  • Negative: [1] Discomfort (pain, burning or stinging) when passing urine AND [2] male  • Negative: [1] Discomfort (pain, burning or stinging) when passing urine AND [2] female  • Negative: Constipation  • Negative: New or worsening leg (calf, thigh) pain  • Negative: New or worsening leg swelling  • Negative: Dizziness is severe, or persists > 24 hours after surgery  • Negative: Pain, redness, swelling, or pus at IV Site  • Negative: Symptoms arising from use of a urinary catheter (Diamond or Coude)  • Negative: Cast problems or questions  • Negative: Medication question  • Negative: [1] Widespread rash AND [2] bright red, sunburn-like  • Negative: [1] SEVERE headache AND [2] after spinal (epidural) anesthesia  • Negative: [1] Vomiting AND [2] persists > 4 hours  • Negative: [1] Vomiting AND [2] abdomen looks much more swollen than usual  • Negative: [1] Drinking very little AND [2] dehydration suspected (e.g., no urine > 12 hours, very dry mouth, very lightheaded)  • Negative: Patient sounds very sick or weak to the triager  • Negative: Sounds like a serious complication to the triager  • Negative: Fever > 100.4 F (38.0 C)    Answer Assessment - Initial Assessment Questions  1. SYMPTOM: \"What's the main symptom you're concerned about?\" (e.g., pain, fever, vomiting)      Fever 100.8 and pain reate " "9  2. ONSET: \"When did pain  start?\"      Since surgery on 11/5  3. SURGERY: \"What surgery was performed?\"      Gallbladder removal and feeding tube  4. DATE of SURGERY: \"When was surgery performed?\"       11/5  5. ANESTHESIA: \" What type of anesthesia did you have?\" (e.g., general, spinal, epidural, local)      general  6. PAIN: \"Is there any pain?\" If so, ask: \"How bad is it?\"  (Scale 1-10; or mild, moderate, severe)      Pain rated 9  7. FEVER: \"Do you have a fever?\" If so, ask: \"What is your temperature, how was it measured, and when did it start?\"      100.8  8. VOMITING: \"Is there any vomiting?\" If yes, ask: \"How many times?\"      no  9. BLEEDING: \"Is there any bleeding?\" If so, ask: \"How much?\" and \"Where?\"      no  10. OTHER SYMPTOMS: \"Do you have any other symptoms?\" (e.g., drainage from wound, painful urination, constipation)        Fever and pain    Protocols used: POST-OP SYMPTOMS AND QUESTIONS-ADULT-      "

## 2020-11-08 ENCOUNTER — APPOINTMENT (OUTPATIENT)
Dept: PREADMISSION TESTING | Facility: HOSPITAL | Age: 60
End: 2020-11-08

## 2020-11-08 PROBLEM — J95.89 POST-OP PNEUMONIA: Status: ACTIVE | Noted: 2020-11-08

## 2020-11-08 PROBLEM — Z85.819 HISTORY OF THROAT CANCER: Status: ACTIVE | Noted: 2020-11-08

## 2020-11-08 PROBLEM — J18.9 POST-OP PNEUMONIA: Status: ACTIVE | Noted: 2020-11-08

## 2020-11-08 PROBLEM — G89.29 CHRONIC PAIN: Status: ACTIVE | Noted: 2020-11-08

## 2020-11-08 LAB
ANION GAP SERPL CALCULATED.3IONS-SCNC: 9 MMOL/L (ref 5–15)
B PARAPERT DNA SPEC QL NAA+PROBE: NOT DETECTED
B PERT DNA SPEC QL NAA+PROBE: NOT DETECTED
BASOPHILS # BLD AUTO: 0.01 10*3/MM3 (ref 0–0.2)
BASOPHILS NFR BLD AUTO: 0.2 % (ref 0–1.5)
BUN SERPL-MCNC: 9 MG/DL (ref 8–23)
BUN/CREAT SERPL: 12.2 (ref 7–25)
C PNEUM DNA NPH QL NAA+NON-PROBE: NOT DETECTED
CALCIUM SPEC-SCNC: 8.3 MG/DL (ref 8.6–10.5)
CHLORIDE SERPL-SCNC: 104 MMOL/L (ref 98–107)
CO2 SERPL-SCNC: 26 MMOL/L (ref 22–29)
CREAT SERPL-MCNC: 0.74 MG/DL (ref 0.76–1.27)
DEPRECATED RDW RBC AUTO: 46.5 FL (ref 37–54)
EOSINOPHIL # BLD AUTO: 0.03 10*3/MM3 (ref 0–0.4)
EOSINOPHIL NFR BLD AUTO: 0.5 % (ref 0.3–6.2)
ERYTHROCYTE [DISTWIDTH] IN BLOOD BY AUTOMATED COUNT: 12.7 % (ref 12.3–15.4)
FLUAV H1 2009 PAND RNA NPH QL NAA+PROBE: NOT DETECTED
FLUAV H1 HA GENE NPH QL NAA+PROBE: NOT DETECTED
FLUAV H3 RNA NPH QL NAA+PROBE: NOT DETECTED
FLUAV SUBTYP SPEC NAA+PROBE: NOT DETECTED
FLUBV RNA ISLT QL NAA+PROBE: NOT DETECTED
GFR SERPL CREATININE-BSD FRML MDRD: 107 ML/MIN/1.73
GFR SERPL CREATININE-BSD FRML MDRD: 130 ML/MIN/1.73
GLUCOSE SERPL-MCNC: 108 MG/DL (ref 65–99)
HADV DNA SPEC NAA+PROBE: NOT DETECTED
HBA1C MFR BLD: 5 % (ref 4.8–5.6)
HCOV 229E RNA SPEC QL NAA+PROBE: NOT DETECTED
HCOV HKU1 RNA SPEC QL NAA+PROBE: NOT DETECTED
HCOV NL63 RNA SPEC QL NAA+PROBE: NOT DETECTED
HCOV OC43 RNA SPEC QL NAA+PROBE: NOT DETECTED
HCT VFR BLD AUTO: 31.6 % (ref 37.5–51)
HGB BLD-MCNC: 10 G/DL (ref 13–17.7)
HMPV RNA NPH QL NAA+NON-PROBE: NOT DETECTED
HPIV1 RNA SPEC QL NAA+PROBE: NOT DETECTED
HPIV2 RNA SPEC QL NAA+PROBE: NOT DETECTED
HPIV3 RNA NPH QL NAA+PROBE: NOT DETECTED
HPIV4 P GENE NPH QL NAA+PROBE: NOT DETECTED
IMM GRANULOCYTES # BLD AUTO: 0.07 10*3/MM3 (ref 0–0.05)
IMM GRANULOCYTES NFR BLD AUTO: 1.3 % (ref 0–0.5)
L PNEUMO1 AG UR QL IA: NEGATIVE
LYMPHOCYTES # BLD AUTO: 0.46 10*3/MM3 (ref 0.7–3.1)
LYMPHOCYTES NFR BLD AUTO: 8.3 % (ref 19.6–45.3)
M PNEUMO IGG SER IA-ACNC: NOT DETECTED
MAGNESIUM SERPL-MCNC: 1.8 MG/DL (ref 1.6–2.4)
MAGNESIUM SERPL-MCNC: 1.9 MG/DL (ref 1.6–2.4)
MCH RBC QN AUTO: 31.4 PG (ref 26.6–33)
MCHC RBC AUTO-ENTMCNC: 31.6 G/DL (ref 31.5–35.7)
MCV RBC AUTO: 99.4 FL (ref 79–97)
MONOCYTES # BLD AUTO: 0.31 10*3/MM3 (ref 0.1–0.9)
MONOCYTES NFR BLD AUTO: 5.6 % (ref 5–12)
NEUTROPHILS NFR BLD AUTO: 4.64 10*3/MM3 (ref 1.7–7)
NEUTROPHILS NFR BLD AUTO: 84.1 % (ref 42.7–76)
NRBC BLD AUTO-RTO: 0 /100 WBC (ref 0–0.2)
PHOSPHATE SERPL-MCNC: 1.2 MG/DL (ref 2.5–4.5)
PHOSPHATE SERPL-MCNC: 1.6 MG/DL (ref 2.5–4.5)
PLATELET # BLD AUTO: 72 10*3/MM3 (ref 140–450)
PMV BLD AUTO: 11.1 FL (ref 6–12)
POTASSIUM SERPL-SCNC: 3.1 MMOL/L (ref 3.5–5.2)
POTASSIUM SERPL-SCNC: 3.7 MMOL/L (ref 3.5–5.2)
PROCALCITONIN SERPL-MCNC: 0.63 NG/ML (ref 0–0.25)
RBC # BLD AUTO: 3.18 10*6/MM3 (ref 4.14–5.8)
RHINOVIRUS RNA SPEC NAA+PROBE: NOT DETECTED
RSV RNA NPH QL NAA+NON-PROBE: NOT DETECTED
S PNEUM AG SPEC QL LA: NEGATIVE
SARS-COV-2 RNA NPH QL NAA+NON-PROBE: NOT DETECTED
SODIUM SERPL-SCNC: 139 MMOL/L (ref 136–145)
TSH SERPL DL<=0.05 MIU/L-ACNC: 3.5 UIU/ML (ref 0.27–4.2)
WBC # BLD AUTO: 5.52 10*3/MM3 (ref 3.4–10.8)

## 2020-11-08 PROCEDURE — 94799 UNLISTED PULMONARY SVC/PX: CPT

## 2020-11-08 PROCEDURE — 96366 THER/PROPH/DIAG IV INF ADDON: CPT

## 2020-11-08 PROCEDURE — 83735 ASSAY OF MAGNESIUM: CPT | Performed by: NURSE PRACTITIONER

## 2020-11-08 PROCEDURE — 84132 ASSAY OF SERUM POTASSIUM: CPT | Performed by: FAMILY MEDICINE

## 2020-11-08 PROCEDURE — 84100 ASSAY OF PHOSPHORUS: CPT | Performed by: NURSE PRACTITIONER

## 2020-11-08 PROCEDURE — 25010000002 PIPERACILLIN SOD-TAZOBACTAM PER 1 G: Performed by: NURSE PRACTITIONER

## 2020-11-08 PROCEDURE — 96372 THER/PROPH/DIAG INJ SC/IM: CPT

## 2020-11-08 PROCEDURE — 25010000003 MAGNESIUM SULFATE 4 GM/100ML SOLUTION: Performed by: NURSE PRACTITIONER

## 2020-11-08 PROCEDURE — 84100 ASSAY OF PHOSPHORUS: CPT | Performed by: FAMILY MEDICINE

## 2020-11-08 PROCEDURE — 96375 TX/PRO/DX INJ NEW DRUG ADDON: CPT

## 2020-11-08 PROCEDURE — G0378 HOSPITAL OBSERVATION PER HR: HCPCS

## 2020-11-08 PROCEDURE — 25010000002 KETOROLAC TROMETHAMINE PER 15 MG: Performed by: NURSE PRACTITIONER

## 2020-11-08 PROCEDURE — 87899 AGENT NOS ASSAY W/OPTIC: CPT | Performed by: NURSE PRACTITIONER

## 2020-11-08 PROCEDURE — 85025 COMPLETE CBC W/AUTO DIFF WBC: CPT | Performed by: NURSE PRACTITIONER

## 2020-11-08 PROCEDURE — 94640 AIRWAY INHALATION TREATMENT: CPT

## 2020-11-08 PROCEDURE — 96376 TX/PRO/DX INJ SAME DRUG ADON: CPT

## 2020-11-08 PROCEDURE — 99220 PR INITIAL OBSERVATION CARE/DAY 70 MINUTES: CPT | Performed by: INTERNAL MEDICINE

## 2020-11-08 PROCEDURE — 97162 PT EVAL MOD COMPLEX 30 MIN: CPT | Performed by: PHYSICAL THERAPIST

## 2020-11-08 PROCEDURE — 25010000002 METOCLOPRAMIDE PER 10 MG: Performed by: NURSE PRACTITIONER

## 2020-11-08 PROCEDURE — 25010000002 HEPARIN (PORCINE) PER 1000 UNITS: Performed by: NURSE PRACTITIONER

## 2020-11-08 PROCEDURE — 0202U NFCT DS 22 TRGT SARS-COV-2: CPT | Performed by: INTERNAL MEDICINE

## 2020-11-08 PROCEDURE — 92610 EVALUATE SWALLOWING FUNCTION: CPT

## 2020-11-08 PROCEDURE — 25010000002 VANCOMYCIN 10 G RECONSTITUTED SOLUTION: Performed by: NURSE PRACTITIONER

## 2020-11-08 PROCEDURE — 25010000002 METHYLNALTREXONE 12 MG/0.6ML SOLUTION: Performed by: SURGERY

## 2020-11-08 PROCEDURE — 84443 ASSAY THYROID STIM HORMONE: CPT | Performed by: NURSE PRACTITIONER

## 2020-11-08 PROCEDURE — 83036 HEMOGLOBIN GLYCOSYLATED A1C: CPT | Performed by: NURSE PRACTITIONER

## 2020-11-08 PROCEDURE — 80048 BASIC METABOLIC PNL TOTAL CA: CPT | Performed by: NURSE PRACTITIONER

## 2020-11-08 PROCEDURE — 96365 THER/PROPH/DIAG IV INF INIT: CPT

## 2020-11-08 PROCEDURE — 25010000002 PIPERACILLIN SOD-TAZOBACTAM PER 1 G: Performed by: PHYSICIAN ASSISTANT

## 2020-11-08 RX ORDER — FERROUS SULFATE 325(65) MG
325 TABLET ORAL
Status: DISCONTINUED | OUTPATIENT
Start: 2020-11-08 | End: 2020-11-08 | Stop reason: ALTCHOICE

## 2020-11-08 RX ORDER — KETOROLAC TROMETHAMINE 30 MG/ML
30 INJECTION, SOLUTION INTRAMUSCULAR; INTRAVENOUS ONCE
Status: COMPLETED | OUTPATIENT
Start: 2020-11-08 | End: 2020-11-08

## 2020-11-08 RX ORDER — NITROGLYCERIN 80 MG/1
1 PATCH TRANSDERMAL DAILY
Status: DISCONTINUED | OUTPATIENT
Start: 2020-11-08 | End: 2020-11-11 | Stop reason: HOSPADM

## 2020-11-08 RX ORDER — DOCUSATE SODIUM 50 MG/5 ML
100 LIQUID (ML) ORAL 2 TIMES DAILY
Status: DISCONTINUED | OUTPATIENT
Start: 2020-11-08 | End: 2020-11-08 | Stop reason: SDUPTHER

## 2020-11-08 RX ORDER — METOCLOPRAMIDE 10 MG/1
10 TABLET ORAL
Status: DISCONTINUED | OUTPATIENT
Start: 2020-11-08 | End: 2020-11-08

## 2020-11-08 RX ORDER — LANSOPRAZOLE
30 KIT EVERY MORNING
Status: DISCONTINUED | OUTPATIENT
Start: 2020-11-08 | End: 2020-11-09

## 2020-11-08 RX ORDER — SODIUM CHLORIDE 9 MG/ML
100 INJECTION, SOLUTION INTRAVENOUS CONTINUOUS
Status: ACTIVE | OUTPATIENT
Start: 2020-11-08 | End: 2020-11-08

## 2020-11-08 RX ORDER — MAGNESIUM SULFATE HEPTAHYDRATE 40 MG/ML
4 INJECTION, SOLUTION INTRAVENOUS AS NEEDED
Status: DISCONTINUED | OUTPATIENT
Start: 2020-11-08 | End: 2020-11-11 | Stop reason: HOSPADM

## 2020-11-08 RX ORDER — CETIRIZINE HYDROCHLORIDE, PSEUDOEPHEDRINE HYDROCHLORIDE 5; 120 MG/1; MG/1
1 TABLET, FILM COATED, EXTENDED RELEASE ORAL 2 TIMES DAILY
Status: DISCONTINUED | OUTPATIENT
Start: 2020-11-08 | End: 2020-11-11 | Stop reason: HOSPADM

## 2020-11-08 RX ORDER — SODIUM CHLORIDE 0.9 % (FLUSH) 0.9 %
10 SYRINGE (ML) INJECTION AS NEEDED
Status: DISCONTINUED | OUTPATIENT
Start: 2020-11-08 | End: 2020-11-11 | Stop reason: HOSPADM

## 2020-11-08 RX ORDER — LEVOTHYROXINE SODIUM 88 UG/1
88 TABLET ORAL DAILY
Status: DISCONTINUED | OUTPATIENT
Start: 2020-11-08 | End: 2020-11-11 | Stop reason: HOSPADM

## 2020-11-08 RX ORDER — BISACODYL 10 MG
10 SUPPOSITORY, RECTAL RECTAL ONCE
Status: COMPLETED | OUTPATIENT
Start: 2020-11-08 | End: 2020-11-08

## 2020-11-08 RX ORDER — BISACODYL 10 MG
10 SUPPOSITORY, RECTAL RECTAL DAILY PRN
Status: DISCONTINUED | OUTPATIENT
Start: 2020-11-08 | End: 2020-11-09

## 2020-11-08 RX ORDER — EZETIMIBE 10 MG/1
10 TABLET ORAL NIGHTLY
Status: DISCONTINUED | OUTPATIENT
Start: 2020-11-08 | End: 2020-11-11 | Stop reason: RX

## 2020-11-08 RX ORDER — CHOLECALCIFEROL (VITAMIN D3) 125 MCG
10 CAPSULE ORAL NIGHTLY PRN
Status: DISCONTINUED | OUTPATIENT
Start: 2020-11-08 | End: 2020-11-11 | Stop reason: HOSPADM

## 2020-11-08 RX ORDER — DOCUSATE SODIUM 100 MG/1
100 CAPSULE, LIQUID FILLED ORAL 2 TIMES DAILY
Status: DISCONTINUED | OUTPATIENT
Start: 2020-11-08 | End: 2020-11-08 | Stop reason: ALTCHOICE

## 2020-11-08 RX ORDER — POTASSIUM CHLORIDE 7.45 MG/ML
10 INJECTION INTRAVENOUS
Status: DISCONTINUED | OUTPATIENT
Start: 2020-11-08 | End: 2020-11-11 | Stop reason: HOSPADM

## 2020-11-08 RX ORDER — ACETAMINOPHEN 650 MG/1
650 SUPPOSITORY RECTAL EVERY 4 HOURS PRN
Status: DISCONTINUED | OUTPATIENT
Start: 2020-11-08 | End: 2020-11-11 | Stop reason: HOSPADM

## 2020-11-08 RX ORDER — METOCLOPRAMIDE HYDROCHLORIDE 5 MG/ML
10 INJECTION INTRAMUSCULAR; INTRAVENOUS EVERY 6 HOURS
Status: DISCONTINUED | OUTPATIENT
Start: 2020-11-08 | End: 2020-11-08 | Stop reason: SDUPTHER

## 2020-11-08 RX ORDER — ONDANSETRON 2 MG/ML
4 INJECTION INTRAMUSCULAR; INTRAVENOUS EVERY 6 HOURS PRN
Status: DISCONTINUED | OUTPATIENT
Start: 2020-11-08 | End: 2020-11-11 | Stop reason: HOSPADM

## 2020-11-08 RX ORDER — ROSUVASTATIN CALCIUM 10 MG/1
10 TABLET, COATED ORAL NIGHTLY
Status: DISCONTINUED | OUTPATIENT
Start: 2020-11-08 | End: 2020-11-11 | Stop reason: HOSPADM

## 2020-11-08 RX ORDER — TIZANIDINE 4 MG/1
4 TABLET ORAL EVERY 8 HOURS PRN
Status: DISCONTINUED | OUTPATIENT
Start: 2020-11-08 | End: 2020-11-11 | Stop reason: HOSPADM

## 2020-11-08 RX ORDER — DOCUSATE SODIUM 50 MG/5 ML
100 LIQUID (ML) ORAL 2 TIMES DAILY
Status: DISCONTINUED | OUTPATIENT
Start: 2020-11-08 | End: 2020-11-11 | Stop reason: HOSPADM

## 2020-11-08 RX ORDER — ALBUTEROL SULFATE 2.5 MG/3ML
2.5 SOLUTION RESPIRATORY (INHALATION) EVERY 4 HOURS PRN
Status: DISCONTINUED | OUTPATIENT
Start: 2020-11-08 | End: 2020-11-11 | Stop reason: HOSPADM

## 2020-11-08 RX ORDER — RISPERIDONE 1 MG/1
1 TABLET ORAL EVERY 12 HOURS SCHEDULED
Status: DISCONTINUED | OUTPATIENT
Start: 2020-11-08 | End: 2020-11-11 | Stop reason: HOSPADM

## 2020-11-08 RX ORDER — ONDANSETRON 4 MG/1
4 TABLET, FILM COATED ORAL EVERY 6 HOURS PRN
Status: DISCONTINUED | OUTPATIENT
Start: 2020-11-08 | End: 2020-11-11 | Stop reason: HOSPADM

## 2020-11-08 RX ORDER — BISACODYL 10 MG
10 SUPPOSITORY, RECTAL RECTAL EVERY 8 HOURS
Status: DISCONTINUED | OUTPATIENT
Start: 2020-11-08 | End: 2020-11-08 | Stop reason: SDUPTHER

## 2020-11-08 RX ORDER — METOCLOPRAMIDE 10 MG/1
10 TABLET ORAL
Status: DISCONTINUED | OUTPATIENT
Start: 2020-11-09 | End: 2020-11-11 | Stop reason: HOSPADM

## 2020-11-08 RX ORDER — OXYCODONE AND ACETAMINOPHEN 10; 325 MG/1; MG/1
1 TABLET ORAL EVERY 6 HOURS PRN
Status: DISCONTINUED | OUTPATIENT
Start: 2020-11-08 | End: 2020-11-11 | Stop reason: HOSPADM

## 2020-11-08 RX ORDER — MAGNESIUM SULFATE HEPTAHYDRATE 40 MG/ML
2 INJECTION, SOLUTION INTRAVENOUS AS NEEDED
Status: DISCONTINUED | OUTPATIENT
Start: 2020-11-08 | End: 2020-11-11 | Stop reason: HOSPADM

## 2020-11-08 RX ORDER — POTASSIUM CHLORIDE 750 MG/1
40 CAPSULE, EXTENDED RELEASE ORAL AS NEEDED
Status: DISCONTINUED | OUTPATIENT
Start: 2020-11-08 | End: 2020-11-11 | Stop reason: HOSPADM

## 2020-11-08 RX ORDER — MELATONIN
2000 DAILY
Status: DISCONTINUED | OUTPATIENT
Start: 2020-11-08 | End: 2020-11-11 | Stop reason: HOSPADM

## 2020-11-08 RX ORDER — TOPIRAMATE 25 MG/1
50 TABLET ORAL EVERY 12 HOURS SCHEDULED
Status: DISCONTINUED | OUTPATIENT
Start: 2020-11-08 | End: 2020-11-11 | Stop reason: HOSPADM

## 2020-11-08 RX ORDER — SIMETHICONE 20 MG/.3ML
40 EMULSION ORAL 4 TIMES DAILY
Status: COMPLETED | OUTPATIENT
Start: 2020-11-08 | End: 2020-11-09

## 2020-11-08 RX ORDER — OXYCODONE HYDROCHLORIDE 5 MG/1
10 TABLET ORAL EVERY 6 HOURS SCHEDULED
Status: DISCONTINUED | OUTPATIENT
Start: 2020-11-08 | End: 2020-11-11 | Stop reason: HOSPADM

## 2020-11-08 RX ORDER — FERROUS SULFATE 300 MG/5ML
300 LIQUID (ML) ORAL
Status: DISCONTINUED | OUTPATIENT
Start: 2020-11-09 | End: 2020-11-11 | Stop reason: HOSPADM

## 2020-11-08 RX ORDER — LORAZEPAM 1 MG/1
2 TABLET ORAL EVERY 6 HOURS PRN
Status: DISCONTINUED | OUTPATIENT
Start: 2020-11-08 | End: 2020-11-09

## 2020-11-08 RX ORDER — ACETAMINOPHEN 160 MG/5ML
650 SOLUTION ORAL EVERY 4 HOURS PRN
Status: DISCONTINUED | OUTPATIENT
Start: 2020-11-08 | End: 2020-11-11 | Stop reason: HOSPADM

## 2020-11-08 RX ORDER — ASPIRIN 81 MG/1
81 TABLET, CHEWABLE ORAL DAILY
Status: DISCONTINUED | OUTPATIENT
Start: 2020-11-09 | End: 2020-11-11 | Stop reason: HOSPADM

## 2020-11-08 RX ORDER — SIMETHICONE 80 MG
80 TABLET,CHEWABLE ORAL 4 TIMES DAILY PRN
Status: DISCONTINUED | OUTPATIENT
Start: 2020-11-08 | End: 2020-11-11 | Stop reason: HOSPADM

## 2020-11-08 RX ORDER — SODIUM CHLORIDE 0.9 % (FLUSH) 0.9 %
10 SYRINGE (ML) INJECTION EVERY 12 HOURS SCHEDULED
Status: DISCONTINUED | OUTPATIENT
Start: 2020-11-08 | End: 2020-11-11 | Stop reason: HOSPADM

## 2020-11-08 RX ORDER — IPRATROPIUM BROMIDE AND ALBUTEROL SULFATE 2.5; .5 MG/3ML; MG/3ML
3 SOLUTION RESPIRATORY (INHALATION)
Status: DISCONTINUED | OUTPATIENT
Start: 2020-11-08 | End: 2020-11-11

## 2020-11-08 RX ORDER — TRAZODONE HYDROCHLORIDE 50 MG/1
50 TABLET ORAL 2 TIMES DAILY
Status: DISCONTINUED | OUTPATIENT
Start: 2020-11-08 | End: 2020-11-11 | Stop reason: HOSPADM

## 2020-11-08 RX ORDER — POTASSIUM CHLORIDE 1.5 G/1.77G
40 POWDER, FOR SOLUTION ORAL AS NEEDED
Status: DISCONTINUED | OUTPATIENT
Start: 2020-11-08 | End: 2020-11-11 | Stop reason: HOSPADM

## 2020-11-08 RX ORDER — OXYCODONE HCL 20 MG/1
20 TABLET, FILM COATED, EXTENDED RELEASE ORAL EVERY 12 HOURS SCHEDULED
Status: DISCONTINUED | OUTPATIENT
Start: 2020-11-08 | End: 2020-11-08

## 2020-11-08 RX ORDER — ACETAMINOPHEN 325 MG/1
650 TABLET ORAL EVERY 4 HOURS PRN
Status: DISCONTINUED | OUTPATIENT
Start: 2020-11-08 | End: 2020-11-11 | Stop reason: HOSPADM

## 2020-11-08 RX ORDER — HEPARIN SODIUM 5000 [USP'U]/ML
5000 INJECTION, SOLUTION INTRAVENOUS; SUBCUTANEOUS EVERY 8 HOURS SCHEDULED
Status: DISCONTINUED | OUTPATIENT
Start: 2020-11-08 | End: 2020-11-09

## 2020-11-08 RX ORDER — BISACODYL 5 MG/1
10 TABLET, DELAYED RELEASE ORAL DAILY
Status: DISCONTINUED | OUTPATIENT
Start: 2020-11-08 | End: 2020-11-09

## 2020-11-08 RX ORDER — METOCLOPRAMIDE HYDROCHLORIDE 5 MG/ML
10 INJECTION INTRAMUSCULAR; INTRAVENOUS EVERY 6 HOURS SCHEDULED
Status: COMPLETED | OUTPATIENT
Start: 2020-11-08 | End: 2020-11-08

## 2020-11-08 RX ORDER — GABAPENTIN 300 MG/1
600 CAPSULE ORAL 3 TIMES DAILY
Status: DISCONTINUED | OUTPATIENT
Start: 2020-11-08 | End: 2020-11-11 | Stop reason: HOSPADM

## 2020-11-08 RX ORDER — ASPIRIN 81 MG/1
81 TABLET ORAL DAILY
Status: DISCONTINUED | OUTPATIENT
Start: 2020-11-08 | End: 2020-11-08 | Stop reason: ALTCHOICE

## 2020-11-08 RX ORDER — ESCITALOPRAM OXALATE 20 MG/1
20 TABLET ORAL 2 TIMES DAILY
Status: DISCONTINUED | OUTPATIENT
Start: 2020-11-08 | End: 2020-11-11 | Stop reason: HOSPADM

## 2020-11-08 RX ADMIN — LEVOTHYROXINE SODIUM 88 MCG: 88 TABLET ORAL at 05:41

## 2020-11-08 RX ADMIN — NITROGLYCERIN 1 PATCH: 0.4 PATCH TRANSDERMAL at 09:09

## 2020-11-08 RX ADMIN — TOPIRAMATE 50 MG: 25 TABLET, FILM COATED ORAL at 03:17

## 2020-11-08 RX ADMIN — SIMETHICONE 40 MG: 20 EMULSION ORAL at 21:24

## 2020-11-08 RX ADMIN — VANCOMYCIN HYDROCHLORIDE 1250 MG: 10 INJECTION, POWDER, LYOPHILIZED, FOR SOLUTION INTRAVENOUS at 21:23

## 2020-11-08 RX ADMIN — SODIUM CHLORIDE 100 ML/HR: 9 INJECTION, SOLUTION INTRAVENOUS at 03:15

## 2020-11-08 RX ADMIN — ASPIRIN 81 MG: 81 TABLET, COATED ORAL at 09:11

## 2020-11-08 RX ADMIN — ESCITALOPRAM OXALATE 20 MG: 20 TABLET ORAL at 21:25

## 2020-11-08 RX ADMIN — GABAPENTIN 600 MG: 300 CAPSULE ORAL at 17:07

## 2020-11-08 RX ADMIN — POTASSIUM & SODIUM PHOSPHATES POWDER PACK 280-160-250 MG 2 PACKET: 280-160-250 PACK at 12:19

## 2020-11-08 RX ADMIN — OXYCODONE HYDROCHLORIDE AND ACETAMINOPHEN 1 TABLET: 10; 325 TABLET ORAL at 21:35

## 2020-11-08 RX ADMIN — METOCLOPRAMIDE 10 MG: 5 INJECTION, SOLUTION INTRAMUSCULAR; INTRAVENOUS at 03:17

## 2020-11-08 RX ADMIN — IPRATROPIUM BROMIDE AND ALBUTEROL SULFATE 3 ML: 2.5; .5 SOLUTION RESPIRATORY (INHALATION) at 19:47

## 2020-11-08 RX ADMIN — LANSOPRAZOLE 30 MG: KIT at 05:44

## 2020-11-08 RX ADMIN — LORAZEPAM 2 MG: 1 TABLET ORAL at 14:32

## 2020-11-08 RX ADMIN — TAZOBACTAM SODIUM AND PIPERACILLIN SODIUM 3.38 G: 375; 3 INJECTION, SOLUTION INTRAVENOUS at 17:17

## 2020-11-08 RX ADMIN — TAZOBACTAM SODIUM AND PIPERACILLIN SODIUM 3.38 G: 375; 3 INJECTION, SOLUTION INTRAVENOUS at 09:25

## 2020-11-08 RX ADMIN — POTASSIUM & SODIUM PHOSPHATES POWDER PACK 280-160-250 MG 2 PACKET: 280-160-250 PACK at 18:40

## 2020-11-08 RX ADMIN — ROSUVASTATIN CALCIUM 10 MG: 10 TABLET, COATED ORAL at 03:16

## 2020-11-08 RX ADMIN — POTASSIUM CHLORIDE 40 MEQ: 1.5 FOR SOLUTION ORAL at 21:35

## 2020-11-08 RX ADMIN — OXYCODONE 10 MG: 5 TABLET ORAL at 06:40

## 2020-11-08 RX ADMIN — IPRATROPIUM BROMIDE AND ALBUTEROL SULFATE 3 ML: 2.5; .5 SOLUTION RESPIRATORY (INHALATION) at 15:15

## 2020-11-08 RX ADMIN — SODIUM CHLORIDE, PRESERVATIVE FREE 10 ML: 5 INJECTION INTRAVENOUS at 03:15

## 2020-11-08 RX ADMIN — OXYCODONE HYDROCHLORIDE AND ACETAMINOPHEN 1 TABLET: 10; 325 TABLET ORAL at 09:08

## 2020-11-08 RX ADMIN — METOCLOPRAMIDE 10 MG: 5 INJECTION, SOLUTION INTRAMUSCULAR; INTRAVENOUS at 21:24

## 2020-11-08 RX ADMIN — METOPROLOL TARTRATE 25 MG: 25 TABLET, FILM COATED ORAL at 21:24

## 2020-11-08 RX ADMIN — CETIRIZINE HCL, PSEUDOEPHEDRINE HCL 1 TABLET: 5; 120 TABLET, EXTENDED RELEASE ORAL at 12:01

## 2020-11-08 RX ADMIN — IPRATROPIUM BROMIDE AND ALBUTEROL SULFATE 3 ML: 2.5; .5 SOLUTION RESPIRATORY (INHALATION) at 07:31

## 2020-11-08 RX ADMIN — LORAZEPAM 2 MG: 1 TABLET ORAL at 05:44

## 2020-11-08 RX ADMIN — OXYCODONE 10 MG: 5 TABLET ORAL at 17:07

## 2020-11-08 RX ADMIN — CETIRIZINE HCL, PSEUDOEPHEDRINE HCL 1 TABLET: 5; 120 TABLET, EXTENDED RELEASE ORAL at 21:24

## 2020-11-08 RX ADMIN — TRAZODONE HYDROCHLORIDE 50 MG: 50 TABLET ORAL at 03:16

## 2020-11-08 RX ADMIN — BISACODYL 10 MG: 5 TABLET, COATED ORAL at 12:01

## 2020-11-08 RX ADMIN — SIMETHICONE 40 MG: 20 EMULSION ORAL at 17:08

## 2020-11-08 RX ADMIN — SIMETHICONE 40 MG: 20 EMULSION ORAL at 09:08

## 2020-11-08 RX ADMIN — RISPERIDONE 1 MG: 1 TABLET ORAL at 03:16

## 2020-11-08 RX ADMIN — METOCLOPRAMIDE 10 MG: 5 INJECTION, SOLUTION INTRAMUSCULAR; INTRAVENOUS at 13:48

## 2020-11-08 RX ADMIN — SODIUM CHLORIDE, PRESERVATIVE FREE 10 ML: 5 INJECTION INTRAVENOUS at 21:25

## 2020-11-08 RX ADMIN — VANCOMYCIN HYDROCHLORIDE 2250 MG: 10 INJECTION, POWDER, LYOPHILIZED, FOR SOLUTION INTRAVENOUS at 03:16

## 2020-11-08 RX ADMIN — TRAZODONE HYDROCHLORIDE 50 MG: 50 TABLET ORAL at 09:24

## 2020-11-08 RX ADMIN — POTASSIUM CHLORIDE 40 MEQ: 1.5 FOR SOLUTION ORAL at 17:08

## 2020-11-08 RX ADMIN — Medication 2000 UNITS: at 09:10

## 2020-11-08 RX ADMIN — ESCITALOPRAM OXALATE 20 MG: 20 TABLET ORAL at 03:16

## 2020-11-08 RX ADMIN — HEPARIN SODIUM 5000 UNITS: 5000 INJECTION INTRAVENOUS; SUBCUTANEOUS at 13:48

## 2020-11-08 RX ADMIN — RISPERIDONE 1 MG: 1 TABLET ORAL at 09:24

## 2020-11-08 RX ADMIN — METOPROLOL TARTRATE 25 MG: 25 TABLET, FILM COATED ORAL at 09:24

## 2020-11-08 RX ADMIN — OXYCODONE HYDROCHLORIDE AND ACETAMINOPHEN 1 TABLET: 10; 325 TABLET ORAL at 03:16

## 2020-11-08 RX ADMIN — RISPERIDONE 1 MG: 1 TABLET ORAL at 21:25

## 2020-11-08 RX ADMIN — BISACODYL 10 MG: 10 SUPPOSITORY RECTAL at 05:41

## 2020-11-08 RX ADMIN — TRAZODONE HYDROCHLORIDE 50 MG: 50 TABLET ORAL at 21:24

## 2020-11-08 RX ADMIN — TOPIRAMATE 50 MG: 25 TABLET, FILM COATED ORAL at 09:25

## 2020-11-08 RX ADMIN — KETOROLAC TROMETHAMINE 30 MG: 30 INJECTION, SOLUTION INTRAMUSCULAR; INTRAVENOUS at 02:30

## 2020-11-08 RX ADMIN — ACETAMINOPHEN ORAL SOLUTION 650 MG: 650 SOLUTION ORAL at 17:08

## 2020-11-08 RX ADMIN — METHYLNALTREXONE BROMIDE 4 MG: 12 INJECTION, SOLUTION SUBCUTANEOUS at 12:01

## 2020-11-08 RX ADMIN — DOCUSATE SODIUM 100 MG: 50 LIQUID ORAL at 09:10

## 2020-11-08 RX ADMIN — METOCLOPRAMIDE 10 MG: 5 INJECTION, SOLUTION INTRAMUSCULAR; INTRAVENOUS at 09:08

## 2020-11-08 RX ADMIN — METOPROLOL TARTRATE 25 MG: 25 TABLET, FILM COATED ORAL at 03:16

## 2020-11-08 RX ADMIN — OXYCODONE 10 MG: 5 TABLET ORAL at 12:01

## 2020-11-08 RX ADMIN — GABAPENTIN 600 MG: 300 CAPSULE ORAL at 09:08

## 2020-11-08 RX ADMIN — ESCITALOPRAM OXALATE 20 MG: 20 TABLET ORAL at 09:24

## 2020-11-08 RX ADMIN — FERROUS SULFATE TAB 325 MG (65 MG ELEMENTAL FE) 325 MG: 325 (65 FE) TAB at 09:08

## 2020-11-08 RX ADMIN — TOPIRAMATE 50 MG: 25 TABLET, FILM COATED ORAL at 21:24

## 2020-11-08 RX ADMIN — MAGNESIUM SULFATE HEPTAHYDRATE 4 G: 40 INJECTION, SOLUTION INTRAVENOUS at 12:19

## 2020-11-08 RX ADMIN — GABAPENTIN 600 MG: 300 CAPSULE ORAL at 21:24

## 2020-11-08 RX ADMIN — HEPARIN SODIUM 5000 UNITS: 5000 INJECTION INTRAVENOUS; SUBCUTANEOUS at 05:41

## 2020-11-08 RX ADMIN — HEPARIN SODIUM 5000 UNITS: 5000 INJECTION INTRAVENOUS; SUBCUTANEOUS at 21:24

## 2020-11-08 RX ADMIN — IPRATROPIUM BROMIDE AND ALBUTEROL SULFATE 3 ML: 2.5; .5 SOLUTION RESPIRATORY (INHALATION) at 10:46

## 2020-11-08 RX ADMIN — SIMETHICONE 40 MG: 20 EMULSION ORAL at 12:01

## 2020-11-08 RX ADMIN — POTASSIUM CHLORIDE 40 MEQ: 1.5 FOR SOLUTION ORAL at 12:19

## 2020-11-08 RX ADMIN — TAZOBACTAM SODIUM AND PIPERACILLIN SODIUM 4.5 G: 500; 4 INJECTION, SOLUTION INTRAVENOUS at 00:30

## 2020-11-08 NOTE — THERAPY EVALUATION
"Acute Care - Speech Language Pathology   Swallow Initial Evaluation [facility]   Clinical Swallow Evaluation     Patient Name: Sonido Villaseñor  : 1960  MRN: [id number]  Today's Date: 2020               Admit Date: 2020    Visit Dx:     ICD-10-CM ICD-9-CM   1. Post-op pneumonia  J95.89 997.39    J18.9 486   2. Post-procedural fever  R50.82 780.62   3. Generalized abdominal pain  R10.84 789.07   4. Dysphagia, unspecified type  R13.10 787.20     Patient Active Problem List   Diagnosis   • Coronary arteriosclerosis in native artery   • Chronic obstructive lung disease (CMS/HCC)   • Diaphragmatic hernia   • Dysphagia   • Hypertensive disorder   • Familial combined hyperlipidemia   • Obstructive sleep apnea syndrome   • Primary malignant neoplasm of supraglottis (CMS/HCC)   • Restrictive lung disease due to kyphoscoliosis   • Squamous cell carcinoma of larynx (CMS/HCC)   • Symptomatic cholelithiasis   • Post-op pneumonia   • History of throat cancer   • Chronic pain     Past Medical History:   Diagnosis Date   • ARDS (adult respiratory distress syndrome) (CMS/HCC)    • Arthritis    • Cancer (CMS/HCC)     throat cancer    • Chest pain    • Constipation    • COPD (chronic obstructive pulmonary disease) (CMS/HCC)    • Dizzy    • Dry skin    • Full dentures    • Fungus infection     left thumb- oral antibx use    • GERD (gastroesophageal reflux disease)    • Hard to intubate    • Headache    • Hiatal hernia     still present    • Hypertension    • Migraine    • Nausea and vomiting    • On home oxygen therapy     2-2.5 prn and nightly    • Sleep apnea     bipap machine compliant - \"suped up bipap\"     • SOBOE (shortness of breath on exertion)    • Swallowing difficulty     larger items - from radiation issue    • Wears glasses     readers     Past Surgical History:   Procedure Laterality Date   • BACK SURGERY     • CATARACT EXTRACTION      bilat    • CHOLECYSTECTOMY WITH INTRAOPERATIVE CHOLANGIOGRAM N/A " 11/5/2020    Procedure: CHOLECYSTECTOMY LAPAROSCOPIC , IOC;  Surgeon: Kevin Fajardo MD;  Location:  [facility];  Service: General;  Laterality: N/A;   • COLONOSCOPY     • CORONARY ARTERY BYPASS GRAFT      x4    • CYST REMOVAL      right hand ganglion cyst    • ENDOSCOPY     • ENDOSCOPY W/ PEG TUBE PLACEMENT N/A 11/5/2020    Procedure: ESOPHAGOGASTRODUODENOSCOPY WITH PERCUTANEOUS ENDOSCOPIC GASTROSTOMY TUBE INSERTION;  Surgeon: Kevin Fajardo MD;  Location:  [facility];  Service: General;  Laterality: N/A;   • THROAT SURGERY      x2   • TONSILLECTOMY          SWALLOW EVALUATION (last 72 hours)      SLP Adult Swallow Evaluation     Row Name 11/08/20 1150                   Rehab Evaluation    Document Type  evaluation  -RD        Subjective Information  no complaints  -RD        Patient Observations  alert;cooperative;agree to therapy  -RD        Patient/Family/Caregiver Comments/Observations  spouse present  -RD        Patient Effort  adequate  -RD           General Information    Patient Profile Reviewed  yes  -RD        Pertinent History Of Current Problem  Adm w/ post-operative PNA, s/p PEG placement w/ Dr. Fajardo. HTN, sleep apnea, COPD, heart dz. Kyphoscoliosis. CXR revealed lordotic positioning and bibasilar airspace dz L> R. Hx of throat cancer s/p supraglottic resection and chemo/radiation. Spouse reports partial removal of epiglottis.   -RD        Current Method of Nutrition  NPO  -RD        Precautions/Limitations, Vision  WFL;for purposes of eval  -RD        Precautions/Limitations, Hearing  WFL;for purposes of eval  -RD        Prior Level of Function-Communication  unknown  -RD        Prior Level of Function-Swallowing  alternative feeding method;other (see comments) hx of severe dysphagia requiring PEG  -RD        Plans/Goals Discussed with  patient;spouse/S.O.;agreed upon  -RD        Barriers to Rehab  medically complex;previous functional deficit  -RD        Patient's Goals for Discharge  return to  regular diet;comfort diet  -RD        Family Goals for Discharge  patient able to return to regular diet;comfort diet  -RD           Pain    Additional Documentation  Pain Scale: Numbers Pre/Post-Treatment (Group)  -RD           Pain Scale: Numbers Pre/Post-Treatment    Pretreatment Pain Rating  9/10  -RD        Posttreatment Pain Rating  9/10  -RD        Pre/Posttreatment Pain Comment  RN aware and recently administered pain medication  -RD           Oral Motor Structure and Function    Dentition Assessment  upper dentures/partial in place;missing teeth  -RD        Secretion Management  wet vocal quality;problems swallowing secretions;requires suctioning to control secretions  -RD        Mucosal Quality  sticky  -RD        Volitional Swallow  weak  -RD        Volitional Cough  weak  -RD           Oral Musculature and Cranial Nerve Assessment    Oral Motor General Assessment  generalized oral motor weakness  -RD           General Eating/Swallowing Observations    Respiratory Support Currently in Use  nasal cannula  -RD        Eating/Swallowing Skills  fed by SLP  -RD        Positioning During Eating  upright in bed;contracted;needs frequent re-positioning  -RD        Utensils Used  spoon;cup;straw  -RD           Respiratory    Respiratory Status  increase in respiratory rate;during swallowing/eating  -RD        Respiratory Pattern  decrease control/incoordination of breathing swallow  -RD           Clinical Swallow Eval    Oral Prep Phase  impaired  -RD        Oral Transit  impaired  -RD        Oral Residue  WFL  -RD        Pharyngeal Phase  suspected pharyngeal impairment  -RD        Esophageal Phase  suspected esophageal impairment  -RD           Oral Prep Concerns    Oral Prep Concerns  increased prep time  -RD        Increased Prep Time  regular consistencies  -RD           Oral Transit Concerns    Oral Transit Concerns  increased oral transit time  -RD        Increased Oral Transit Time  regular consistencies   -RD           Pharyngeal Phase Concerns    Pharyngeal Phase Concerns  wet vocal quality;cough  -RD        Wet Vocal Quality  all consistencies  -RD        Cough  all consistencies  -RD        Pharyngeal Phase Concerns, Comment  Pt w/ a hx of aspiration and severe pharyngoesophageal dysphagia requiring PEG per pt/spouse. Pt w/ partial removal of epiglottis 2' throat cancer. Spouse reports that pt has had a PEG 3 x's, but pt wishes to continue to eat/drink despite aspiration and risks. Pt s/p PEG placement w/ post-op PNA. Pt w/ kyposcoliosis per chart. Pt w/ poor positioning 2' spinal changes. Suspect cervical spine changes possible that could potentially impact swallowing as well given current appearance of spine. Spouse also reports pt w/ aspiration of reflux noted on previous swallowing study at OSH. PO trials given. Overt s/s of aspiration c/b incr'd wet vocal quality and coughing w/ all consistencies. Pt's cough was productive requiring expectoration. Pt would benefit from suctioning kit, RN notified. Suspect cont'd severe dysphagia w/ aspiration of all consistencies. Discussed GOC given pt w/ current PNA and given the chronic nature of dysphagia (no suspected improvement of current condition given etiology of current dysphagia). Pt spouse aware of aspiration and risk for cont'd PNA and respiratory decline. Pt does not wish for full trays right now, but requests water while at the hospital. Education provided on importance of oral care prior to drinking small amounts of water. Pt/spouse report that pt will likely use PEG for meds and some nutrition, but will continue to eat/drink after D/C for QOL despite aspiration risk. Spoke to MD via telephone to notify of pt/spouse GOC. No further SLP dysphagia needs at this time. Pt declined instrumental evaluation and does not wish to address swallowing further at this time. Education provided on aspiration and reflux precautions, even when using PEG tube given risk of  aspiration of reflux. Re-consult if further needs/concerns.   -RD           Esophageal Phase Concerns    Esophageal Phase Concerns  sensation of material sticking;other (see comments)  -RD        Sensation of Material Sticking  all consistencies  -RD        Esophageal Phase Concerns, Comment  pt/spouse report hx of aspiration of reflux noted on prior swallowing study at OSH  -RD           Clinical Impression    SLP Swallowing Diagnosis  suspected pharyngeal dysphagia;esophageal dysphagia;severe  -RD        Functional Impact  risk of aspiration/pneumonia  -RD        Rehab Potential/Prognosis, Swallowing  re-evaluate goals as necessary  -RD        Swallow Criteria for Skilled Therapeutic Interventions Met  no significant expected improvement in functional status;patient/family declined skilled intervention at this time  -RD           Recommendations    Therapy Frequency (Swallow)  evaluation only  -RD        SLP Diet Recommendation  NPO;long term alternate methods of nutrition/hydration;water between meals after oral care, with supervision;other (see comments) Water/ice for comfort; pt/spouse aware of risks; comfort diet pending GOC  -RD        Recommended Diagnostics  other (see comments) declined MBS/FEES  -RD        Recommended Precautions and Strategies  general aspiration precautions;reflux precautions  -RD        Oral Care Recommendations  Oral Care BID/PRN  -RD        SLP Rec. for Method of Medication Administration  meds via alternate route  -RD        Monitor for Signs of Aspiration  notify SLP if any concerns  -RD        Anticipated Discharge Disposition (SLP)  unknown  -RD          User Key  (r) = Recorded By, (t) = Taken By, (c) = Cosigned By    Initials Name Effective Dates    Neema Franco MS CCC-SLP 04/03/18 -           EDUCATION  The patient has been educated in the following areas:   Dysphagia (Swallowing Impairment) Oral Care/Hydration NPO rationale comfort diet education, water post oral  care.    SLP Recommendation and Plan  SLP Swallowing Diagnosis: suspected pharyngeal dysphagia, esophageal dysphagia, severe  SLP Diet Recommendation: NPO, long term alternate methods of nutrition/hydration, water between meals after oral care, with supervision, other (see comments)(Water/ice for comfort; pt/spouse aware of risks)  Recommended Precautions and Strategies: general aspiration precautions, reflux precautions  SLP Rec. for Method of Medication Administration: meds via alternate route     Monitor for Signs of Aspiration: notify SLP if any concerns  Recommended Diagnostics: other (see comments)(declined MBS/FEES)  Swallow Criteria for Skilled Therapeutic Interventions Met: no significant expected improvement in functional status, patient/family declined skilled intervention at this time  Anticipated Discharge Disposition (SLP): unknown  Rehab Potential/Prognosis, Swallowing: re-evaluate goals as necessary  Therapy Frequency (Swallow): evaluation only                                        Time Calculation:   Time Calculation- SLP     Row Name 11/08/20 1427             Time Calculation- SLP    SLP Start Time  1150  -RD      SLP Received On  11/08/20  -RD        User Key  (r) = Recorded By, (t) = Taken By, (c) = Cosigned By    Initials Name Provider Type    RD Neema Erickson MS CCC-SLP Speech and Language Pathologist          Therapy Charges for Today     Code Description Service Date Service Provider Modifiers Qty    [id number] HC ST EVAL ORAL PHARYNG SWALLOW 4 11/8/2020 Neema Erickson MS CCC-SLP GN 1      Patient was not wearing a face mask and did exhibit coughing during this therapy encounter.  Procedure performed was aerosolizing, involved close contact (within 6 feet for at least 15 minutes or longer), and did not involve contact with infectious secretions or specimens.  Therapist used appropriate personal protective equipment including gloves, standard procedure mask and eye protection.   Appropriate PPE was worn during the entire therapy session.  Hand hygiene was completed before and after therapy session.            Neema Erickson MS CCC-SLP  11/8/2020

## 2020-11-08 NOTE — CONSULTS
Patient Name:  Sonido Villaseñor  YOB: 1960  [id number]       Patient Care Team:  Jorge Fernandez MD as PCP - General (Family Medicine)      General Surgery Consult Note     Date of Consultation: 11/08/20    Consulting Physician - Kenya Cisse*    Reason for Consult - Pneumonia    Subjective     I have been asked to see  Sonido Villaseñor , a 60 y.o. male in consultation for pneumonia. Mr. Garcia is well-known to me. He underwent a laparoscopic cholecystectomy with intraoperative cholangiography as well as EGD and PEG placement on 11/4/2020 for both symptomatic cholelithiasis, as well as chronic dysphagia. He has a known history of significant head neck cancer status post radiation, and has had prior dysphagia evaluations which showed that he had a high risk of aspiration of all materials. He underwent his operation, and was kept overnight for observation. He was at his usual state of health postop day 1 and was discharged home. According to his wife, they were unable to get his pain medication filled (he is on a large number of chronic narcotics for chronic pain), and the pharmacist refused to fill any additional pain medication requests. She reports that he complained of postoperative pain, and had difficulty coughing up secretions. His activity level was also decreased, and she became worried so she brought him to the emergency department last evening. Subsequent evaluation was concerning for potential early pneumonia versus atelectasis. He was admitted to the hospitalist service, we've been asked to see him for possible surgical management.    Since admission, his pain control is been improved. He states he is feeling better. He was tolerating some tube feeds at home, but has not yet had a bowel movement. He does require chronic Reglan and a chronic bowel regimen for bowel function, given his chronic narcotic use. He has chronic functional limitations, and although he can transfer, he cannot  walk very long distances at all and often uses a wheelchair.      Allergy:   Allergies   Allergen Reactions   • Cefaclor Anaphylaxis   • Ceftriaxone Rash       Medications:  vancomycin, 11.7 mg/kg, Intravenous, Q18H    And  [START ON 11/10/2020] Pharmacy Consult, , Does not apply, Once  aspirin, 81 mg, Oral, Daily  cetirizine-pseudoephedrine, 1 tablet, Oral, BID  cholecalciferol, 2,000 Units, Oral, Daily  docusate sodium, 100 mg, Oral, BID  escitalopram, 20 mg, Oral, BID  ezetimibe, 10 mg, Oral, Nightly  ferrous sulfate, 325 mg, Oral, Daily With Breakfast  gabapentin, 600 mg, Oral, TID  heparin (porcine), 5,000 Units, Subcutaneous, Q8H  ipratropium-albuterol, 3 mL, Nebulization, Q4H - RT  lansoprazole, 30 mg, Nasogastric, QAM  levothyroxine, 88 mcg, Oral, Daily  linaclotide, 290 mcg, Oral, QAM AC  metoclopramide, 10 mg, Intravenous, Q6H  [START ON 11/9/2020] metoclopramide, 10 mg, Oral, 4x Daily AC & at Bedtime  metoprolol tartrate, 25 mg, Oral, Q12H  nitroglycerin, 1 patch, Transdermal, Daily  oxyCODONE, 10 mg, Oral, Q6H  piperacillin-tazobactam, 3.375 g, Intravenous, Q8H  risperiDONE, 1 mg, Oral, Q12H  rosuvastatin, 10 mg, Oral, Nightly  simethicone, 40 mg, Per G Tube, 4x Daily  sodium chloride, 10 mL, Intravenous, Q12H  topiramate, 50 mg, Oral, Q12H  traZODone, 50 mg, Oral, BID      Pharmacy to dose vancomycin,   sodium chloride, 100 mL/hr, Last Rate: 100 mL/hr (11/08/20 0315)      No current facility-administered medications on file prior to encounter.      Current Outpatient Medications on File Prior to Encounter   Medication Sig   • amoxicillin (AMOXIL) 875 MG tablet Take 875 mg by mouth 2 (Two) Times a Day. 10 days -  Started it 30th   • aspirin 81 MG EC tablet Take 81 mg by mouth Daily.   • butalbital-acetaminophen-caffeine (FIORICET, ESGIC) -40 MG per tablet Take 1 tablet by mouth 2 (two) times a day. No more than 2-3 times per week   • Cholecalciferol (Vitamin D) 50 MCG (2000 UT) tablet Take 2,000  Units by mouth Daily.   • docusate sodium (COLACE) 150 MG/15ML liquid Take 10 mL by mouth Daily.   • escitalopram (LEXAPRO) 20 MG tablet Take 20 mg by mouth 2 (two) times a day.   • esomeprazole (nexIUM) 40 MG capsule Take 40 mg by mouth Every Morning Before Breakfast.   • ezetimibe (ZETIA) 10 MG tablet Take 10 mg by mouth Every Night.   • ferrous sulfate 325 (65 FE) MG tablet Take 325 mg by mouth Daily With Breakfast.   • fexofenadine-pseudoephedrine (ALLEGRA-D 24) 180-240 MG per 24 hr tablet Take 1 tablet by mouth Daily As Needed for Allergies.   • gabapentin (NEURONTIN) 400 MG capsule Take 600 mg by mouth 3 (Three) Times a Day.   • levothyroxine (SYNTHROID, LEVOTHROID) 88 MCG tablet Take 88 mcg by mouth Daily.   • linaclotide (LINZESS) 290 MCG capsule capsule Take 290 mcg by mouth Every Morning Before Breakfast.   • LORazepam (ATIVAN) 2 MG tablet Take 2 mg by mouth Every 6 (Six) Hours As Needed for Anxiety.   • Melatonin 10 MG tablet Take  by mouth Every Night.   • metoclopramide (REGLAN) 10 MG tablet Take 10 mg by mouth 4 (Four) Times a Day Before Meals & at Bedtime.   • metoprolol tartrate (LOPRESSOR) 25 MG tablet Take 25 mg by mouth 2 (Two) Times a Day.   • nitroglycerin (NITRODUR) 0.4 MG/HR patch Place 1 patch on the skin as directed by provider Daily.   • nitroglycerin (NITROSTAT) 0.4 MG SL tablet Place 0.4 mg under the tongue Every 5 (Five) Minutes As Needed for Chest Pain. Take no more than 3 doses in 15 minutes.   • oxyCODONE (oxyCONTIN) 20 MG 12 hr tablet Take 1 tablet by mouth Every 12 (Twelve) Hours.   • oxyCODONE ER (oxyCONTIN) 20 MG 12 hr tablet Take 20 mg by mouth Every 12 (Twelve) Hours.   • oxyCODONE-acetaminophen (PERCOCET)  MG per tablet Take 1 tablet by mouth Every 6 (Six) Hours As Needed for Moderate Pain .   • risperiDONE (risperDAL) 1 MG tablet Take 1 mg by mouth 2 (Two) Times a Day.   • rosuvastatin (CRESTOR) 10 MG tablet Take 10 mg by mouth Every Night.   • tiZANidine (ZANAFLEX) 4 MG  "tablet Take 4 mg by mouth Every 8 (Eight) Hours As Needed for Muscle Spasms.   • topiramate (TOPAMAX) 50 MG tablet Take 50 mg by mouth 2 (Two) Times a Day.   • traZODone (DESYREL) 100 MG tablet Take 50 mg by mouth 2 (two) times a day. 1/2 in morning and 1 1/2 at bed       PMHx:   Past Medical History:   Diagnosis Date   • ARDS (adult respiratory distress syndrome) (CMS/[facility])    • Arthritis    • Cancer (CMS/[facility])     throat cancer    • Chest pain    • Constipation    • COPD (chronic obstructive pulmonary disease) (CMS/[facility])    • Dizzy    • Dry skin    • Full dentures    • Fungus infection     left thumb- oral antibx use    • GERD (gastroesophageal reflux disease)    • Hard to intubate    • Headache    • Hiatal hernia     still present    • Hypertension    • Migraine    • Nausea and vomiting    • On home oxygen therapy     2-2.5 prn and nightly    • Sleep apnea     bipap machine compliant - \"suped up bipap\"     • SOBOE (shortness of breath on exertion)    • Swallowing difficulty     larger items - from radiation issue    • Wears glasses     readers       Past Surgical History:  Past Surgical History:   Procedure Laterality Date   • BACK SURGERY     • CATARACT EXTRACTION      bilat    • CHOLECYSTECTOMY WITH INTRAOPERATIVE CHOLANGIOGRAM N/A 11/5/2020    Procedure: CHOLECYSTECTOMY LAPAROSCOPIC , IOC;  Surgeon: Kevin Fajardo MD;  Location: [facility] OR;  Service: General;  Laterality: N/A;   • COLONOSCOPY     • CORONARY ARTERY BYPASS GRAFT      x4    • CYST REMOVAL      right hand ganglion cyst    • ENDOSCOPY     • ENDOSCOPY W/ PEG TUBE PLACEMENT N/A 11/5/2020    Procedure: ESOPHAGOGASTRODUODENOSCOPY WITH PERCUTANEOUS ENDOSCOPIC GASTROSTOMY TUBE INSERTION;  Surgeon: Kevin Fajardo MD;  Location:  [facility] OR;  Service: General;  Laterality: N/A;   • THROAT SURGERY      x2   • TONSILLECTOMY           Family History: Noncontributory     Social History:    Tobacco use: Denies     EtOH use : Denies    Illicit drug use: Denies " "     Review of Systems        Constitutional: No fevers, chills or malaise   Eyes: Denies visual changes    Cardiovascular: Denies chest pain, palpitations   Pulmonary: (+) cough and shortness of breath   Abdominal/ GI: See HPI    Genitourinary: Denies dysuria or hematuria   Musculoskeletal: Denies any but chronic joint aches, pains or deformities   Psychiatric: No recent mood changes   Neurologic: No paresthesias or loss of function          Objective     Physical Exam:      Vital Signs  /85   Pulse 79   Temp 98.9 °F (37.2 °C) (Oral)   Resp 20   Ht 157.5 cm (62\")   Wt 107 kg (235 lb 1.6 oz)   SpO2 94%   BMI 43.00 kg/m²   No intake or output data in the 24 hours ending 11/08/20 0928      Physical Exam:    Head: Normocephalic, atraumatic.   Eyes: Pupils equal, round, react to light and accommodation.   Mouth: Oral mucosa without lesions,   Neck: No masses, lymphadenopathy or carotid bruits bilaterally   CV: Rhythm  and rate regular , no  murmurs, rubs or gallops  Lungs: Rhonchi to auscultation bilaterally (at baseline)  Abdomen: Bowel sounds positive  , soft, appropriately tender around incisions only. Incisions c/d/i. Slight bruising right flank.  Groin : No obvious hernias bilaterally   Extremities:  No cyanosis, clubbing or edema bilaterally   Lymphatics: No abnormal lymphadenopathy appreciated   Neurologic: No gross deficits       Results Review: I have personally reviewed all of the recent lab and imaging results available at this time. Laboratory exam reveals a white count on admission of 7.5, currently 5.5, hemoglobin 10.2, platelet count of 84. Comprehensive metabolic panel demonstrated normal transaminases, normal alkaline phosphatase, bilirubin 0.3. CT scan of the abdomen pelvis was personally viewed by me as well as the final dictated and edited report. This demonstrates expected postoperative findings after cholecystectomy without evidence of fluid collection or abscess. There is no free air " or free fluid of note. The gastrostomy tube is well positioned within the stomach. There is mention made of a dilated appendix, but on my view this appendix while marginally dilated is fluid-filled, has no inflammatory stranding or wall thickening, and no evidence of appendicitis. This is likely just a normal variant. There is no evidence of bowel obstruction. There appears to be contrast within the colon. There is perhaps mild atelectasis and bilateral small pleural effusions likely reactive.       Assessment/Plan     Assessment and Plan:    Hospital Problem List     * (Principal) Post-op pneumonia - I suspect this is mainly atelectasis and not a true pneumonia. Nevertheless, I suspect that given his failure to obtain pain medication, and his recent operation, he had difficulty taking deep breaths, in the setting of COPD on chronic home oxygen. There is no evidence of postoperative intra-abdominal complication, and I do not believe he has any clinical evidence of appendicitis at all. I agree with antibiotics at this time, as well as working with physical occupational therapy while here to increase his functional status. I will also start him on aggressive bowel regimen given his chronic motility issues and chronic narcotic use. I will continue to follow this patient with you.      Dysphagia    Hypertensive disorder        Obstructive sleep apnea syndrome    History of throat cancer    Chronic pain                  I discussed the patient's findings and my recommendations with the patient and/or family, as well as the primary team     Kevin Fajardo MD  11/08/20  09:28 EST

## 2020-11-08 NOTE — PLAN OF CARE
Problem: Adult Inpatient Plan of Care  Goal: Plan of Care Review  Outcome: Ongoing, Progressing  Flowsheets (Taken 11/8/2020 1150)  Plan of Care Reviewed With:   patient   spouse   SLP evaluation completed. Will sign-off as pt/spouse declined further SLP dysphagia intervention at this time. Please see note for further details and recommendations.

## 2020-11-08 NOTE — PLAN OF CARE
Problem: Adult Inpatient Plan of Care  Goal: Plan of Care Review  Recent Flowsheet Documentation  Taken 11/8/2020 1025 by Luz Cooley, ESTELITA  Plan of Care Reviewed With:   patient   spouse  Outcome Summary: PT evaluation completed.  Pt transferred supine-->sit with MinAx1, stood with CGA, and ambulated 100 feet using rw with CGAx1.  Pt very SOA post gait with O2 noted to be 88% - recovered within 2 minutes of sitting rest break and PLB.  Skilled PT services warranted to improve mobility and safety.  Recommend d/c home with assist and HH PT.

## 2020-11-08 NOTE — ED PROVIDER NOTES
"Subjective   Patient is a 60-year-old male presents emergency room today with complaints of abdominal pain.  Patient is status post laparoscopic Yvette and EGD with PEG placement performed by Dr. Kevin Fajardo on November 5, 2020.  Patient reports that he was discharged home and went to have his pain medication filled at the pharmacy and they would not fill his medication because he was already on medication for chronic pain management.  Patient states that he continued to be in pain and thus the reason he presents to the ED for further evaluation.  He denies anything specific that makes his symptoms better or worse.  He shares no additional associated symptoms at the time of interview and exam.          Review of Systems  10 point review of systems negative except as stated previously in history of present illness  Past Medical History:   Diagnosis Date   • ARDS (adult respiratory distress syndrome) (CMS/[facility])    • Arthritis    • Cancer (CMS/[facility])     throat cancer    • Chest pain    • Constipation    • COPD (chronic obstructive pulmonary disease) (CMS/[facility])    • Dizzy    • Dry skin    • Full dentures    • Fungus infection     left thumb- oral antibx use    • GERD (gastroesophageal reflux disease)    • Hard to intubate    • Headache    • Hiatal hernia     still present    • Hypertension    • Migraine    • Nausea and vomiting    • On home oxygen therapy     2-2.5 prn and nightly    • Sleep apnea     bipap machine compliant - \"suped up bipap\"     • SOBOE (shortness of breath on exertion)    • Swallowing difficulty     larger items - from radiation issue    • Wears glasses     readers       Allergies   Allergen Reactions   • Cefaclor Anaphylaxis   • Ceftriaxone Rash       Past Surgical History:   Procedure Laterality Date   • BACK SURGERY     • CATARACT EXTRACTION      bilat    • CHOLECYSTECTOMY WITH INTRAOPERATIVE CHOLANGIOGRAM N/A 11/5/2020    Procedure: CHOLECYSTECTOMY LAPAROSCOPIC , IOC;  Surgeon: Kevin Fajardo, " MD;  Location:  [facility];  Service: General;  Laterality: N/A;   • COLONOSCOPY     • CORONARY ARTERY BYPASS GRAFT      x4    • CYST REMOVAL      right hand ganglion cyst    • ENDOSCOPY     • ENDOSCOPY W/ PEG TUBE PLACEMENT N/A 2020    Procedure: ESOPHAGOGASTRODUODENOSCOPY WITH PERCUTANEOUS ENDOSCOPIC GASTROSTOMY TUBE INSERTION;  Surgeon: Kevin Fajardo MD;  Location:  [facility];  Service: General;  Laterality: N/A;   • THROAT SURGERY      x2   • TONSILLECTOMY         History reviewed. No pertinent family history.    Social History     Socioeconomic History   • Marital status:      Spouse name: Not on file   • Number of children: Not on file   • Years of education: Not on file   • Highest education level: Not on file   Tobacco Use   • Smoking status: Former Smoker     Packs/day: 2.00     Years: 40.00     Pack years: 80.00     Types: Cigarettes     Quit date:      Years since quittin.8   • Smokeless tobacco: Former User     Types: Chew     Quit date:    Substance and Sexual Activity   • Alcohol use: Yes     Frequency: Never     Comment: social    • Drug use: Never   • Sexual activity: Defer           Objective   Physical Exam  Vitals signs and nursing note reviewed.   Constitutional:       General: He is not in acute distress.     Appearance: Normal appearance. He is well-developed. He is not ill-appearing or toxic-appearing.   HENT:      Head: Normocephalic and atraumatic.      Mouth/Throat:      Mouth: Mucous membranes are dry.   Eyes:      General: No scleral icterus.     Extraocular Movements: Extraocular movements intact.   Neck:      Musculoskeletal: Normal range of motion and neck supple.   Cardiovascular:      Rate and Rhythm: Normal rate and regular rhythm.   Pulmonary:      Effort: Pulmonary effort is normal. No respiratory distress.      Breath sounds: Normal breath sounds.   Abdominal:      General: There is no distension.      Palpations: Abdomen is soft.      Tenderness: There  is generalized abdominal tenderness.      Comments: Surgical site healing well without signs or symptoms of infection.  No abnormal drainage.   Musculoskeletal:         General: No swelling.   Skin:     General: Skin is warm and dry.   Neurological:      General: No focal deficit present.      Mental Status: He is alert.   Psychiatric:         Behavior: Behavior normal.         Thought Content: Thought content normal.         Judgment: Judgment normal.         Procedures           ED Course  ED Course as of Nov 08 0326   Sun Nov 08, 2020   0008 General surgery paged, spoke with Dr. Rodriguez who does not believe 9 mm is alarming for a appendicitis without evidence of appendicolith or fat stranding.  She is suspicious patient likely went home postoperatively and is having difficulty with pain management as he could not get his prescriptions filled.  Recommended admission and pain control and no additional recommendations from a surgical standpoint.    [JG]   0322 Patient presents to ED for evaluation of abdominal pain and a low-grade fever postoperatively.  Diffuse tenderness to palpation of abdomen on physical exam without acute/emergent abnormalities.  No additional acute/emergent abnormalities on labs.  CT scan of abdomen pelvis reveals postoperative changes without evidence of drainable fluid collection to suggest abscess.  Of note patient has appendix that is dilated to 9 mm which was reviewed with general surgery who suggested that there was little concern for appendicitis.  Evidence of bibasilar atelectasis or pneumonia and small effusions.  Chest x-ray without acute cardiopulmonary process.  Patient given pain medication and initial dose of antibiotics while in the emergency department.  Hospitalist consulted for admission and was agreeable to accept patient.  Patient and family agreeable to plan of care and hospital admission.    [JG]      ED Course User Index  [JG] Andres Mondragon PA           Recent Results  (from the past 24 hour(s))   Light Blue Top    Collection Time: 11/07/20  6:03 PM   Result Value Ref Range    Extra Tube hold for add-on    Green Top (Gel)    Collection Time: 11/07/20  6:03 PM   Result Value Ref Range    Extra Tube Hold for add-ons.    Lavender Top    Collection Time: 11/07/20  6:03 PM   Result Value Ref Range    Extra Tube hold for add-on    Gold Top - SST    Collection Time: 11/07/20  6:03 PM   Result Value Ref Range    Extra Tube Hold for add-ons.    Gray Top - Ice    Collection Time: 11/07/20  6:03 PM   Result Value Ref Range    Extra Tube Hold for add-ons.    Comprehensive Metabolic Panel    Collection Time: 11/07/20  6:03 PM    Specimen: Blood   Result Value Ref Range    Glucose 103 (H) 65 - 99 mg/dL    BUN 12 8 - 23 mg/dL    Creatinine 0.84 0.76 - 1.27 mg/dL    Sodium 136 136 - 145 mmol/L    Potassium 3.5 3.5 - 5.2 mmol/L    Chloride 100 98 - 107 mmol/L    CO2 25.0 22.0 - 29.0 mmol/L    Calcium 8.7 8.6 - 10.5 mg/dL    Total Protein 6.5 6.0 - 8.5 g/dL    Albumin 3.30 (L) 3.50 - 5.20 g/dL    ALT (SGPT) 18 1 - 41 U/L    AST (SGOT) 21 1 - 40 U/L    Alkaline Phosphatase 18 (L) 39 - 117 U/L    Total Bilirubin 0.3 0.0 - 1.2 mg/dL    eGFR Non African Amer 93 >60 mL/min/1.73    eGFR  African Amer 112 >60 mL/min/1.73    Globulin 3.2 gm/dL    A/G Ratio 1.0 g/dL    BUN/Creatinine Ratio 14.3 7.0 - 25.0    Anion Gap 11.0 5.0 - 15.0 mmol/L   Lipase    Collection Time: 11/07/20  6:03 PM    Specimen: Blood   Result Value Ref Range    Lipase 10 (L) 13 - 60 U/L   Lactic Acid, Plasma    Collection Time: 11/07/20  6:03 PM    Specimen: Blood   Result Value Ref Range    Lactate 1.5 0.5 - 2.0 mmol/L   CBC Auto Differential    Collection Time: 11/07/20  6:03 PM    Specimen: Blood   Result Value Ref Range    WBC 7.51 3.40 - 10.80 10*3/mm3    RBC 3.29 (L) 4.14 - 5.80 10*6/mm3    Hemoglobin 10.2 (L) 13.0 - 17.7 g/dL    Hematocrit 32.7 (L) 37.5 - 51.0 %    MCV 99.4 (H) 79.0 - 97.0 fL    MCH 31.0 26.6 - 33.0 pg    MCHC  31.2 (L) 31.5 - 35.7 g/dL    RDW 13.0 12.3 - 15.4 %    RDW-SD 48.0 37.0 - 54.0 fl    MPV 12.0 6.0 - 12.0 fL    Platelets 84 (L) 140 - 450 10*3/mm3    Neutrophil % 84.8 (H) 42.7 - 76.0 %    Lymphocyte % 8.9 (L) 19.6 - 45.3 %    Monocyte % 5.2 5.0 - 12.0 %    Eosinophil % 0.3 0.3 - 6.2 %    Basophil % 0.1 0.0 - 1.5 %    Immature Grans % 0.7 (H) 0.0 - 0.5 %    Neutrophils, Absolute 6.37 1.70 - 7.00 10*3/mm3    Lymphocytes, Absolute 0.67 (L) 0.70 - 3.10 10*3/mm3    Monocytes, Absolute 0.39 0.10 - 0.90 10*3/mm3    Eosinophils, Absolute 0.02 0.00 - 0.40 10*3/mm3    Basophils, Absolute 0.01 0.00 - 0.20 10*3/mm3    Immature Grans, Absolute 0.05 0.00 - 0.05 10*3/mm3    nRBC 0.0 0.0 - 0.2 /100 WBC   Urinalysis With Microscopic If Indicated (No Culture) - Urine, Clean Catch    Collection Time: 11/07/20  8:38 PM    Specimen: Urine, Clean Catch   Result Value Ref Range    Color, UA Yellow Yellow, Straw    Appearance, UA Clear Clear    pH, UA 7.5 5.0 - 8.0    Specific Gravity, UA 1.008 1.001 - 1.030    Glucose, UA Negative Negative    Ketones, UA Trace (A) Negative    Bilirubin, UA Negative Negative    Blood, UA Trace (A) Negative    Protein, UA Trace (A) Negative    Leuk Esterase, UA Negative Negative    Nitrite, UA Negative Negative    Urobilinogen, UA 1.0 E.U./dL 0.2 - 1.0 E.U./dL   Urinalysis, Microscopic Only - Urine, Clean Catch    Collection Time: 11/07/20  8:38 PM    Specimen: Urine, Clean Catch   Result Value Ref Range    RBC, UA 0-2 None Seen, 0-2 /HPF    WBC, UA 0-2 None Seen, 0-2 /HPF    Bacteria, UA None Seen None Seen, Trace /HPF    Squamous Epithelial Cells, UA None Seen None Seen, 0-2 /HPF    Hyaline Casts, UA None Seen 0 - 6 /LPF    Methodology Automated Microscopy    Respiratory Panel PCR w/COVID-19(SARS-CoV-2) [facility] In-House, NP Swab in UTM/VTM, 3-4 HR TAT - Swab, Nasopharynx    Collection Time: 11/08/20  1:31 AM    Specimen: Nasopharynx; Swab   Result Value Ref Range    ADENOVIRUS, PCR  Not Detected Not Detected    Coronavirus 229E Not Detected Not Detected    Coronavirus HKU1 Not Detected Not Detected    Coronavirus NL63 Not Detected Not Detected    Coronavirus OC43 Not Detected Not Detected    COVID19 Not Detected Not Detected - Ref. Range    Human Metapneumovirus Not Detected Not Detected    Human Rhinovirus/Enterovirus Not Detected Not Detected    Influenza A PCR Not Detected Not Detected    Influenza A H1 Not Detected Not Detected    Influenza A H1 2009 PCR Not Detected Not Detected    Influenza A H3 Not Detected Not Detected    Influenza B PCR Not Detected Not Detected    Parainfluenza Virus 1 Not Detected Not Detected    Parainfluenza Virus 2 Not Detected Not Detected    Parainfluenza Virus 3 Not Detected Not Detected    Parainfluenza Virus 4 Not Detected Not Detected    RSV, PCR Not Detected Not Detected    Bordetella pertussis pcr Not Detected Not Detected    Bordetella parapertussis PCR Not Detected Not Detected    Chlamydophila pneumoniae PCR Not Detected Not Detected    Mycoplasma pneumo by PCR Not Detected Not Detected     Note: In addition to lab results from this visit, the labs listed above may include labs taken at another facility or during a different encounter within the last 24 hours. Please correlate lab times with ED admission and discharge times for further clarification of the services performed during this visit.    CT Abdomen Pelvis With Contrast   Final Result   1. Postoperative changes of cholecystectomy with induration of the fat planes in the gallbladder fossa without associated drainable fluid collection to suggest abscess. No free air.   2. The appendix is dilated up to 9 mm. No additional evidence of acute appendicitis but early acute appendicitis is in the differential in the appropriate clinical context.   3. Bibasilar atelectasis or pneumonia and small effusions.   4. Large hiatal hernia.                  Signer Name: Andres Luna MD    Signed: 11/7/2020 11:50  "PM    Workstation Name: [id number]     [facility]      XR Chest 1 View   Final Result   Study is considerably limited by lordotic positioning. Allowing for this there is probably no interval change to slight worsening in bibasilar left greater than right-sided airspace disease and postoperative cardiac silhouette enlargement. There may be   mild interstitial edema. No pneumothorax. Follow-up recommended.       Signer Name: Frances Medrano MD    Signed: 11/7/2020 9:12 PM    Workstation Name: [email or web address]     [facility]        Vitals:    11/07/20 2230 11/07/20 2300 11/08/20 0148 11/08/20 0150   BP: 150/86 150/82 172/89 165/82   BP Location:   Right arm Left arm   Patient Position:   Lying Lying   Pulse: 79  88 87   Resp:   22 22   Temp:   98.9 °F (37.2 °C)    TempSrc:   Oral    SpO2: 94%   94%   Weight:   107 kg (235 lb 1.6 oz)    Height:   157.5 cm (62\")      Medications   sodium chloride 0.9 % flush 10 mL (has no administration in time range)   heparin (porcine) 5000 UNIT/ML injection 5,000 Units (has no administration in time range)   Pharmacy to dose vancomycin (has no administration in time range)   vancomycin 1250 mg/250 mL 0.9% NS IVPB ([facility]) (has no administration in time range)     And   ! Vancomycin trough 11/10 at 0730.  Please hold vancomycin dose 11/10 at 0800 until pharmacy can evaluate level (has no administration in time range)   vancomycin 2250 mg/500 mL 0.9% NS IVPB ([facility]) (has no administration in time range)   aspirin EC tablet 81 mg (has no administration in time range)   cholecalciferol (VITAMIN D3) tablet 2,000 Units (has no administration in time range)   docusate sodium (COLACE) liquid 100 mg (has no administration in time range)   escitalopram (LEXAPRO) tablet 20 mg (has no administration in time range)   lansoprazole (FIRST) oral suspension 30 mg (has no administration in time range)   ferrous sulfate tablet 325 mg (has no administration in time " range)   cetirizine-pseudoephedrine (ZyrTEC-D) 5-120 MG per 12 hr tablet 1 tablet (has no administration in time range)   levothyroxine (SYNTHROID, LEVOTHROID) tablet 88 mcg (has no administration in time range)   melatonin tablet 10 mg (has no administration in time range)   metoprolol tartrate (LOPRESSOR) tablet 25 mg (has no administration in time range)   nitroglycerin (NITRODUR) 0.4 MG/HR patch 1 patch (has no administration in time range)   risperiDONE (risperDAL) tablet 1 mg (has no administration in time range)   rosuvastatin (CRESTOR) tablet 10 mg (has no administration in time range)   tiZANidine (ZANAFLEX) tablet 4 mg (has no administration in time range)   topiramate (TOPAMAX) tablet 50 mg (has no administration in time range)   traZODone (DESYREL) tablet 50 mg (has no administration in time range)   sodium chloride 0.9 % flush 10 mL (has no administration in time range)   sodium chloride 0.9 % flush 10 mL (has no administration in time range)   sodium chloride 0.9 % infusion (has no administration in time range)   piperacillin-tazobactam (ZOSYN) 3.375 g in iso-osmotic dextrose 50 ml (premix) (has no administration in time range)   acetaminophen (TYLENOL) tablet 650 mg (has no administration in time range)     Or   acetaminophen (TYLENOL) 160 MG/5ML solution 650 mg (has no administration in time range)     Or   acetaminophen (TYLENOL) suppository 650 mg (has no administration in time range)   potassium chloride (MICRO-K) CR capsule 40 mEq (has no administration in time range)     Or   potassium chloride (KLOR-CON) packet 40 mEq (has no administration in time range)     Or   potassium chloride 10 mEq in 100 mL IVPB (has no administration in time range)   Magnesium Sulfate 2 gram Bolus, followed by 8 gram infusion (total Mg dose 10 grams)- Mg less than or equal to 1mg/dL (has no administration in time range)     Or   Magnesium Sulfate 2 gram / 50mL Infusion (GIVE X 3 BAGS TO EQUAL 6GM TOTAL DOSE) - Mg 1.1  - 1.5 mg/dl (has no administration in time range)     Or   Magnesium Sulfate 4 gram infusion- Mg 1.6-1.9 mg/dL (has no administration in time range)   ipratropium-albuterol (DUO-NEB) nebulizer solution 3 mL (3 mL Nebulization Not Given 11/8/20 0232)   albuterol (PROVENTIL) nebulizer solution 0.083% 2.5 mg/3mL (has no administration in time range)   ondansetron (ZOFRAN) tablet 4 mg (has no administration in time range)     Or   ondansetron (ZOFRAN) injection 4 mg (has no administration in time range)   bisacodyl (DULCOLAX) suppository 10 mg (has no administration in time range)   magnesium hydroxide (MILK OF MAGNESIA) suspension 2400 mg/10mL 10 mL (has no administration in time range)   simethicone (MYLICON) 40 MG/0.6ML drops 40 mg (has no administration in time range)   linaclotide (LINZESS) capsule 290 mcg **PATIENT SUPPLIED** (has no administration in time range)   ezetimibe (ZETIA) tablet 10 mg **PATIENT SUPPLIED** (has no administration in time range)   LORazepam (ATIVAN) tablet 2 mg (has no administration in time range)   gabapentin (NEURONTIN) capsule 600 mg (has no administration in time range)   oxyCODONE ER (oxyCONTIN) 12 hr tablet 20 mg (has no administration in time range)   oxyCODONE-acetaminophen (PERCOCET)  MG per tablet 1 tablet (has no administration in time range)   metoclopramide (REGLAN) injection 10 mg (has no administration in time range)   bisacodyl (DULCOLAX) suppository 10 mg (has no administration in time range)   metoclopramide (REGLAN) tablet 10 mg (has no administration in time range)   ondansetron (ZOFRAN) injection 4 mg (4 mg Intravenous Given 11/7/20 2046)   Morphine sulfate (PF) injection 4 mg (4 mg Intravenous Given 11/7/20 2045)   acetaminophen (TYLENOL) tablet 500 mg (500 mg Oral Given 11/7/20 2046)   iopamidol (ISOVUE-300) 61 % injection 100 mL (95 mL Intravenous Given 11/7/20 2327)   piperacillin-tazobactam (ZOSYN) 4.5 g in iso-osmotic dextrose 100 mL IVPB (premix) (4.5 g  Intravenous New Bag 11/8/20 0030)   ketorolac (TORADOL) injection 30 mg (30 mg Intravenous Given 11/8/20 0230)     ECG/EMG Results (last 24 hours)     ** No results found for the last 24 hours. **        No orders to display                                     MDM  Number of Diagnoses or Management Options  Generalized abdominal pain: new and requires workup  Post-op pneumonia: new and requires workup  Post-procedural fever: new and requires workup     Amount and/or Complexity of Data Reviewed  Clinical lab tests: reviewed  Tests in the radiology section of CPT®: reviewed    Risk of Complications, Morbidity, and/or Mortality  Presenting problems: moderate  Diagnostic procedures: moderate  Management options: moderate    Patient Progress  Patient progress: stable      Final diagnoses:   Post-op pneumonia   Post-procedural fever   Generalized abdominal pain            Andres Mondragon PA  11/08/20 0329

## 2020-11-08 NOTE — THERAPY EVALUATION
"Patient Name: Sonido Villaseñor  : 1960    MRN: [id number]                              Today's Date: 2020       Admit Date: 2020    Visit Dx:     ICD-10-CM ICD-9-CM   1. Post-op pneumonia  J95.89 997.39    J18.9 486   2. Post-procedural fever  R50.82 780.62   3. Generalized abdominal pain  R10.84 789.07     Patient Active Problem List   Diagnosis   • Coronary arteriosclerosis in native artery   • Chronic obstructive lung disease (CMS/HCC)   • Diaphragmatic hernia   • Dysphagia   • Hypertensive disorder   • Familial combined hyperlipidemia   • Obstructive sleep apnea syndrome   • Primary malignant neoplasm of supraglottis (CMS/HCC)   • Restrictive lung disease due to kyphoscoliosis   • Squamous cell carcinoma of larynx (CMS/HCC)   • Symptomatic cholelithiasis   • Post-op pneumonia   • History of throat cancer   • Chronic pain     Past Medical History:   Diagnosis Date   • ARDS (adult respiratory distress syndrome) (CMS/HCC)    • Arthritis    • Cancer (CMS/HCC)     throat cancer    • Chest pain    • Constipation    • COPD (chronic obstructive pulmonary disease) (CMS/HCC)    • Dizzy    • Dry skin    • Full dentures    • Fungus infection     left thumb- oral antibx use    • GERD (gastroesophageal reflux disease)    • Hard to intubate    • Headache    • Hiatal hernia     still present    • Hypertension    • Migraine    • Nausea and vomiting    • On home oxygen therapy     2-2.5 prn and nightly    • Sleep apnea     bipap machine compliant - \"suped up bipap\"     • SOBOE (shortness of breath on exertion)    • Swallowing difficulty     larger items - from radiation issue    • Wears glasses     readers     Past Surgical History:   Procedure Laterality Date   • BACK SURGERY     • CATARACT EXTRACTION      bilat    • CHOLECYSTECTOMY WITH INTRAOPERATIVE CHOLANGIOGRAM N/A 2020    Procedure: CHOLECYSTECTOMY LAPAROSCOPIC , IOC;  Surgeon: Kevin Fajardo MD;  Location: [facility];  Service: General;  " Laterality: N/A;   • COLONOSCOPY     • CORONARY ARTERY BYPASS GRAFT      x4    • CYST REMOVAL      right hand ganglion cyst    • ENDOSCOPY     • ENDOSCOPY W/ PEG TUBE PLACEMENT N/A 11/5/2020    Procedure: ESOPHAGOGASTRODUODENOSCOPY WITH PERCUTANEOUS ENDOSCOPIC GASTROSTOMY TUBE INSERTION;  Surgeon: Kevin Fajardo MD;  Location: [facility];  Service: General;  Laterality: N/A;   • THROAT SURGERY      x2   • TONSILLECTOMY       General Information     Row Name 11/08/20 1025          Physical Therapy Time and Intention    Document Type  evaluation  -LM     Mode of Treatment  individual therapy;physical therapy  -LM     Row Name 11/08/20 1025          General Information    Patient Profile Reviewed  yes  -LM     Prior Level of Function  independent:;all household mobility;gait;feeding;grooming;min assist:;dressing;bathing Wife assist with shower transfers, drying off patient, and donning/doffing socks/shoes/pants  -LM     Existing Precautions/Restrictions  fall;oxygen therapy device and L/min;other (see comments) Lap drea and PEG tube placement - 11/4  -     Barriers to Rehab  previous functional deficit  -LM     Row Name 11/08/20 1025          Living Environment    Lives With  spouse  -LM     Row Name 11/08/20 1025          Home Main Entrance    Number of Stairs, Main Entrance  -- Entry Ramp  -LM     Row Name 11/08/20 1025          Stairs Within Home, Primary    Stairs, Within Home, Primary  Pt has one small step from living room to kitchen/master bedroom area  -LM     Number of Stairs, Within Home, Primary  one  -LM     Stair Railings, Within Home, Primary  none  -LM     Row Name 11/08/20 1025          Cognition    Orientation Status (Cognition)  oriented x 3  -LM     Row Name 11/08/20 1025          Safety Issues, Functional Mobility    Safety Issues Affecting Function (Mobility)  safety precaution awareness;safety precautions follow-through/compliance;insight into deficits/self-awareness;judgment  -LM      Impairments Affecting Function (Mobility)  balance;cognition;endurance/activity tolerance;shortness of breath;strength  -LM       User Key  (r) = Recorded By, (t) = Taken By, (c) = Cosigned By    Initials Name Provider Type    Luz Velez PT Physical Therapist        Mobility     Row Name 11/08/20 1025          Bed Mobility    Bed Mobility  supine-sit;sit-supine  -LM     Supine-Sit [place] (Bed Mobility)  minimum assist (75% patient effort);1 person assist;verbal cues  -LM     Sit-Supine [place] (Bed Mobility)  contact guard;1 person assist;verbal cues  -LM     Assistive Device (Bed Mobility)  bed rails;head of bed elevated  -LM     Comment (Bed Mobility)  Vc's for sequencing.  -LM     Row Name 11/08/20 1025          Transfers    Comment (Transfers)  Pt stood multiple reps from bed as pt would stand before PT ready.  -LM     Row Name 11/08/20 1025          Sit-Stand Transfer    Sit-Stand [place] (Transfers)  contact guard;1 person assist;verbal cues  -LM     Assistive Device (Sit-Stand Transfers)  walker, front-wheeled  -LM     Row Name 11/08/20 1025          Gait/Stairs (Locomotion)    [place] Level (Gait)  contact guard;1 person assist;verbal cues  -LM     Assistive Device (Gait)  walker, front-wheeled  -LM     Distance in Feet (Gait)  100 feet  -LM     Deviations/Abnormal Patterns (Gait)  darleen decreased;weight shifting decreased  -LM     Bilateral Gait Deviations  forward flexed posture;heel strike decreased  -LM     Comment (Gait/Stairs)  Pt ambulates with forward head posture - this is normal as pt had radiation for throat CA.  Vc's for upright posture and to stay inside walker.  Pt very SOA post gait - O2 noted to be 88%.  Pt recovered within 2 minutes of sitting rest and PLB.  -LM       User Key  (r) = Recorded By, (t) = Taken By, (c) = Cosigned By    Initials Name Provider Type    Luz Velez PT Physical Therapist        Obj/Interventions     Row Name 11/08/20 1025           Range of Motion Comprehensive    General Range of Motion  bilateral lower extremity ROM WFL  -LM     Row Name 11/08/20 1025          Strength Comprehensive (MMT)    General Manual Muscle Testing (MMT) Assessment  lower extremity strength deficits identified  -LM     Comment, General Manual Muscle Testing (MMT) Assessment  BLEs - Hip flex - 4/5; Knee flex/ext - 4+/5; Ankle DF - 4/5  -LM     Row Name 11/08/20 1025          Balance    Balance Assessment  sitting static balance;standing static balance;standing dynamic balance  -LM     Static Sitting Balance  WFL  -LM     Static Standing Balance  WFL;supported  -LM     Dynamic Standing Balance  mild impairment;supported  -LM     Row Name 11/08/20 1025          Sensory Assessment (Somatosensory)    Sensory Assessment (Somatosensory)  LE sensation intact  -LM       User Key  (r) = Recorded By, (t) = Taken By, (c) = Cosigned By    Initials Name Provider Type    LM Luz Cooley, PT Physical Therapist        Goals/Plan     Row Name 11/08/20 1025          Bed Mobility Goal 1 (PT)    Activity/Assistive Device (Bed Mobility Goal 1, PT)  sit to supine/supine to sit  -LM     [place] Level/Cues Needed (Bed Mobility Goal 1, PT)  independent  -LM     Time Frame (Bed Mobility Goal 1, PT)  long term goal (LTG);2 weeks  -LM     Row Name 11/08/20 1025          Transfer Goal 1 (PT)    Activity/Assistive Device (Transfer Goal 1, PT)  bed-to-chair/chair-to-bed  -LM     [place] Level/Cues Needed (Transfer Goal 1, PT)  modified independence  -LM     Time Frame (Transfer Goal 1, PT)  long term goal (LTG);2 weeks  -LM     Row Name 11/08/20 1025          Gait Training Goal 1 (PT)    Activity/Assistive Device (Gait Training Goal 1, PT)  gait (walking locomotion);assistive device use  -LM     [place] Level (Gait Training Goal 1, PT)  modified independence  -LM     Distance (Gait Training Goal 1, PT)  150 feet  -LM     Time Frame (Gait Training Goal 1, PT)  long term goal (LTG);2  weeks  -LM       User Key  (r) = Recorded By, (t) = Taken By, (c) = Cosigned By    Initials Name Provider Type    LM Luz Cooley, PT Physical Therapist        Clinical Impression     Row Name 11/08/20 1025          Pain    Additional Documentation  Pain Scale: Numbers Pre/Post-Treatment (Group)  -[facility] Name 11/08/20 1025          Pain Scale: Numbers Pre/Post-Treatment    Pretreatment Pain Rating  8/10  -LM     Posttreatment Pain Rating  8/10  -LM     Pain Location  abdomen  -LM     Pain Intervention(s)  Repositioned;Ambulation/increased activity  -     Row Name 11/08/20 1025          Plan of Care Review    Plan of Care Reviewed With  patient;spouse  -     Outcome Summary  PT evaluation completed.  Pt transferred supine-->sit with MinAx1, stood with CGA, and ambulated 100 feet using rw with CGAx1.  Pt very SOA post gait with O2 noted to be 88% - recovered within 2 minutes of sitting rest break and PLB.  Skilled PT services warranted to improve mobility and safety.  Recommend d/c home with assist and  PT.  -     Row Name 11/08/20 1025          Therapy Assessment/Plan (PT)    Rehab Potential (PT)  good, to achieve stated therapy goals  -     Criteria for Skilled Interventions Met (PT)  yes;meets criteria;skilled treatment is necessary  -     Row Name 11/08/20 1025          Vital Signs    Pre Systolic BP Rehab  165  -LM     Pre Treatment Diastolic BP  85  -LM     Pretreatment Heart Rate (beats/min)  69  -LM     Posttreatment Heart Rate (beats/min)  72  -LM     Pre SpO2 (%)  95  -LM     O2 Delivery Pre Treatment  supplemental O2  -LM     Intra SpO2 (%)  88 s/p 100 feet of gait  -LM     O2 Delivery Intra Treatment  supplemental O2  -LM     Post SpO2 (%)  96  -LM     O2 Delivery Post Treatment  supplemental O2  -LM     Pre Patient Position  Supine  -LM     Intra Patient Position  Sitting  -LM     Post Patient Position  Supine  -LM     Row Name 11/08/20 1025          Positioning and Restraints     Pre-Treatment Position  in bed  -LM     Post Treatment Position  bed  -LM     In Bed  supine;call light within reach;encouraged to call for assist;exit alarm on;with family/caregiver;with other staff;notified nsg with respiratory therapist  -LM       User Key  (r) = Recorded By, (t) = Taken By, (c) = Cosigned By    Initials Name Provider Type    LM Luz Cooley PT Physical Therapist        Outcome Measures     Row Name 11/08/20 1025          How much help from another person do you currently need...    Turning from your back to your side while in flat bed without using bedrails?  3  -LM     Moving from lying on back to sitting on the side of a flat bed without bedrails?  3  -LM     Moving to and from a bed to a chair (including a wheelchair)?  3  -LM     Standing up from a chair using your arms (e.g., wheelchair, bedside chair)?  3  -LM     Climbing 3-5 steps with a railing?  3  -LM     To walk in hospital room?  3  -LM     AM-PAC 6 Clicks Score (PT)  18  -LM     Row Name 11/08/20 1025          Functional Assessment    Outcome Measure Options  AM-PAC 6 Clicks Basic Mobility (PT)  -LM       User Key  (r) = Recorded By, (t) = Taken By, (c) = Cosigned By    Initials Name Provider Type    Luz Velez PT Physical Therapist        Physical Therapy Education                 Title: PT OT SLP Therapies (In Progress)     Topic: Physical Therapy (In Progress)     Point: Mobility training (In Progress)     Learning Progress Summary           Patient Acceptance, E, NR by LM at 11/8/2020 1104                   Point: Home exercise program (Not Started)     Learner Progress:  Not documented in this visit.          Point: Precautions (In Progress)     Learning Progress Summary           Patient Acceptance, E, NR by LM at 11/8/2020 1104                               User Key     Initials Effective Dates Name Provider Type Discipline    LM 07/24/19 -  Luz Cooley PT Physical Therapist PT              PT Recommendation and  Plan  Planned Therapy Interventions (PT): balance training, bed mobility training, gait training, home exercise program, motor coordination training, neuromuscular re-education, patient/family education, postural re-education, stair training, strengthening, stretching, transfer training  Plan of Care Reviewed With: patient, spouse  Outcome Summary: PT evaluation completed.  Pt transferred supine-->sit with MinAx1, stood with CGA, and ambulated 100 feet using rw with CGAx1.  Pt very SOA post gait with O2 noted to be 88% - recovered within 2 minutes of sitting rest break and PLB.  Skilled PT services warranted to improve mobility and safety.  Recommend d/c home with assist and HH PT.     Time Calculation:   PT Charges     Row Name 11/08/20 1025             Time Calculation    Start Time  1025  -LM      PT Received On  11/08/20  -LM      PT Goal Re-Cert Due Date  11/18/20  -LM        User Key  (r) = Recorded By, (t) = Taken By, (c) = Cosigned By    Initials Name Provider Type     Luz Cooley, PT Physical Therapist        Therapy Charges for Today     Code Description Service Date Service Provider Modifiers Qty    [id number]  PT EVAL MOD COMPLEXITY 4 11/8/2020 Luz Cooley, PT GP 1          PT G-Codes  Outcome Measure Options: AM-PAC 6 Clicks Basic Mobility (PT)  AM-PAC 6 Clicks Score (PT): 18    Luz Cooley PT  11/8/2020

## 2020-11-08 NOTE — H&P
[facility] Medicine Services  HISTORY AND PHYSICAL    Patient Name: Sonido Villaseñor  : 1960  MRN: [id number]  Primary Care Physician: Jorge Fernandez MD  Date of admission: 2020      Subjective   Subjective     Chief Complaint:  Abdominal pain    HPI:  Sonido Villaseñor is a 60 y.o. male with a PMH significant for chronic dysphagia secondary to throat cancer s/p resection and radiation w/ resulting kyphosis and chronic pain as well as anxiety and depression, HTN, HLD, chronic constipation, GERD, seasonal allergies and recent lap drea and PEG placement (20) who presents to [facility] ED [facility] for evaluation of abdominal pain. The patient is alert and cooperative but his wife is present at the bedside and provides most of the HPI; tells me the patient was d/c'd home on 20 and had a difficult time getting his pain medication filled d/t some confusion with his chronic pain medication and the new pain prescription and therefore was not getting any additional pain medication post operatively after d/t home; per her report the patient has had decreased mobility d/t his pain and had only been ambulating from his chair to the bed, he has been standing up at his chair to use the urinal. He has not had an BM since surgery and has not been passing gas. His pain in in the mid abdominal region and radiates side to side. He has had mild nausea but no vomiting and has had 1 can of isosource 1.5 last pm and tolerated this ok. She tells me his goal is 2 cans daily b/c this is what he agreed to (he has had PEG x 3 in the past and the last one was several years ago and was infected). He is currently on 2.5 liter oxygen and is currently at baseline oxygen. He has had a productive cough, fever, mild shortness of breath and fatigue over the past 24 hours. He follows with pain management and takes high doses of narcotic pain medication at baseline; he is also on chronic benzos. Currently he rates his  "pain 7/10 and is asking for a pain shot. Denies tobacco, only social alcohol use and no illicit drug use. Denies any known COVID-19 contacts and had a negative test preoperatively.     Current COVID Risks are:  [x] Fever [x]  Cough [x] Shortness of breath [x] Fatigue [] Change in taste or smell    [] Exposure to COVID positive patient  [] High risk facility   []  NONE    Review of Systems   Constitutional: Positive for activity change, appetite change, chills, fatigue and fever.   HENT: Negative.    Respiratory: Positive for cough. Negative for shortness of breath and wheezing.    Cardiovascular: Negative.    Gastrointestinal: Positive for abdominal distention, abdominal pain, constipation and nausea. Negative for blood in stool, diarrhea and vomiting.   Endocrine: Negative.    Genitourinary: Negative.    Musculoskeletal: Positive for arthralgias, back pain (chronic) and neck pain (chronic kyphosis).   Skin: Positive for color change (Right flank bruise) and wound (post op lap sites; new PEG site).   Allergic/Immunologic: Negative.    Neurological: Negative.    Hematological: Bruises/bleeds easily.   Psychiatric/Behavioral: Negative.       All other systems reviewed and are negative.     Personal History     Past Medical History:   Diagnosis Date   • ARDS (adult respiratory distress syndrome) (CMS/HCC)    • Arthritis    • Cancer (CMS/HCC)     throat cancer    • Chest pain    • Constipation    • COPD (chronic obstructive pulmonary disease) (CMS/HCC)    • Dizzy    • Dry skin    • Full dentures    • Fungus infection     left thumb- oral antibx use    • GERD (gastroesophageal reflux disease)    • Hard to intubate    • Headache    • Hiatal hernia     still present    • Hypertension    • Migraine    • Nausea and vomiting    • On home oxygen therapy     2-2.5 prn and nightly    • Sleep apnea     bipap machine compliant - \"suped up bipap\"     • SOBOE (shortness of breath on exertion)    • Swallowing difficulty     larger " items - from radiation issue    • Wears glasses     readers       Past Surgical History:   Procedure Laterality Date   • BACK SURGERY     • CATARACT EXTRACTION      bilat    • CHOLECYSTECTOMY WITH INTRAOPERATIVE CHOLANGIOGRAM N/A 11/5/2020    Procedure: CHOLECYSTECTOMY LAPAROSCOPIC , IOC;  Surgeon: Kevin Fajardo MD;  Location:  [facility];  Service: General;  Laterality: N/A;   • COLONOSCOPY     • CORONARY ARTERY BYPASS GRAFT      x4    • CYST REMOVAL      right hand ganglion cyst    • ENDOSCOPY     • ENDOSCOPY W/ PEG TUBE PLACEMENT N/A 11/5/2020    Procedure: ESOPHAGOGASTRODUODENOSCOPY WITH PERCUTANEOUS ENDOSCOPIC GASTROSTOMY TUBE INSERTION;  Surgeon: Kevin Fajardo MD;  Location:  [facility];  Service: General;  Laterality: N/A;   • THROAT SURGERY      x2   • TONSILLECTOMY         Family History: family history is not on file. Otherwise pertinent FHx was reviewed and unremarkable.     Social History:  reports that he quit smoking about 22 months ago. His smoking use included cigarettes. He has a 80.00 pack-year smoking history. He quit smokeless tobacco use about 22 months ago.  His smokeless tobacco use included chew. He reports current alcohol use. He reports that he does not use drugs.  Social History     Social History Narrative   • Not on file       Medications:  Available home medication information reviewed.  Medications Prior to Admission   Medication Sig Dispense Refill Last Dose   • amoxicillin (AMOXIL) 875 MG tablet Take 875 mg by mouth 2 (Two) Times a Day. 10 days -  Started it 30th      • aspirin 81 MG EC tablet Take 81 mg by mouth Daily.      • butalbital-acetaminophen-caffeine (FIORICET, ESGIC) -40 MG per tablet Take 1 tablet by mouth 2 (two) times a day. No more than 2-3 times per week      • Cholecalciferol (Vitamin D) 50 MCG (2000 UT) tablet Take 2,000 Units by mouth Daily.      • docusate sodium (COLACE) 150 MG/15ML liquid Take 10 mL by mouth Daily. 20 mL 0    • escitalopram (LEXAPRO)  20 MG tablet Take 20 mg by mouth 2 (two) times a day.      • esomeprazole (nexIUM) 40 MG capsule Take 40 mg by mouth Every Morning Before Breakfast.      • ezetimibe (ZETIA) 10 MG tablet Take 10 mg by mouth Every Night.      • ferrous sulfate 325 (65 FE) MG tablet Take 325 mg by mouth Daily With Breakfast.      • fexofenadine-pseudoephedrine (ALLEGRA-D 24) 180-240 MG per 24 hr tablet Take 1 tablet by mouth Daily As Needed for Allergies.      • gabapentin (NEURONTIN) 400 MG capsule Take 600 mg by mouth 3 (Three) Times a Day.      • levothyroxine (SYNTHROID, LEVOTHROID) 88 MCG tablet Take 88 mcg by mouth Daily.      • linaclotide (LINZESS) 290 MCG capsule capsule Take 290 mcg by mouth Every Morning Before Breakfast.      • LORazepam (ATIVAN) 2 MG tablet Take 2 mg by mouth Every 6 (Six) Hours As Needed for Anxiety.      • Melatonin 10 MG tablet Take  by mouth Every Night.      • metoclopramide (REGLAN) 10 MG tablet Take 10 mg by mouth 4 (Four) Times a Day Before Meals & at Bedtime.      • metoprolol tartrate (LOPRESSOR) 25 MG tablet Take 25 mg by mouth 2 (Two) Times a Day.      • nitroglycerin (NITRODUR) 0.4 MG/HR patch Place 1 patch on the skin as directed by provider Daily.      • nitroglycerin (NITROSTAT) 0.4 MG SL tablet Place 0.4 mg under the tongue Every 5 (Five) Minutes As Needed for Chest Pain. Take no more than 3 doses in 15 minutes.      • oxyCODONE (oxyCONTIN) 20 MG 12 hr tablet Take 1 tablet by mouth Every 12 (Twelve) Hours. 6 tablet 0    • oxyCODONE ER (oxyCONTIN) 20 MG 12 hr tablet Take 20 mg by mouth Every 12 (Twelve) Hours.      • oxyCODONE-acetaminophen (PERCOCET)  MG per tablet Take 1 tablet by mouth Every 6 (Six) Hours As Needed for Moderate Pain .      • risperiDONE (risperDAL) 1 MG tablet Take 1 mg by mouth 2 (Two) Times a Day.      • rosuvastatin (CRESTOR) 10 MG tablet Take 10 mg by mouth Every Night.      • tiZANidine (ZANAFLEX) 4 MG tablet Take 4 mg by mouth Every 8 (Eight) Hours As Needed  for Muscle Spasms.      • topiramate (TOPAMAX) 50 MG tablet Take 50 mg by mouth 2 (Two) Times a Day.      • traZODone (DESYREL) 100 MG tablet Take 50 mg by mouth 2 (two) times a day. 1/2 in morning and 1 1/2 at bed          Allergies   Allergen Reactions   • Cefaclor Anaphylaxis   • Ceftriaxone Rash       Objective   Objective     Vital Signs:   Temp:  [100.7 °F (38.2 °C)] 100.7 °F (38.2 °C)  Heart Rate:  [77-87] 79  Resp:  [28] 28  BP: (123-179)/(70-86) 150/82        Physical Exam  Constitutional:       General: He is not in acute distress.     Appearance: He is obese. He is ill-appearing (chronically ill appearing). He is not toxic-appearing or diaphoretic.   HENT:      Head: Normocephalic and atraumatic.      Nose: Nose normal. No congestion or rhinorrhea.      Mouth/Throat:      Mouth: Mucous membranes are dry.      Pharynx: Oropharynx is clear. No oropharyngeal exudate.   Eyes:      General: No scleral icterus.     Extraocular Movements: Extraocular movements intact.      Pupils: Pupils are equal, round, and reactive to light.   Neck:      Musculoskeletal: Neck rigidity (chronic) present.      Comments: Chronic kyphosis w/ decreased ROM    Cardiovascular:      Rate and Rhythm: Normal rate and regular rhythm.      Pulses: Normal pulses.      Heart sounds: Normal heart sounds. No murmur.   Pulmonary:      Effort: Pulmonary effort is normal. No respiratory distress.      Breath sounds: Decreased breath sounds (diminished lung sounds throughout) present. No wheezing, rhonchi or rales.   Abdominal:      General: There is distension.      Palpations: Abdomen is soft.      Tenderness: There is abdominal tenderness. There is no guarding.          Comments: Lap sites x 4 intact w/o drainage   Musculoskeletal:      Right lower leg: Edema (1+ bilaterally) present.      Left lower leg: Edema present.   Skin:     General: Skin is warm and dry.      Capillary Refill: Capillary refill takes less than 2 seconds.      Findings:  Bruising present.   Neurological:      General: No focal deficit present.      Mental Status: He is alert and oriented to person, place, and time.   Psychiatric:         Mood and Affect: Mood normal. Affect is flat.         Speech: Speech is delayed.         Behavior: Behavior is slowed.      Comments: Flat affect w/ delayed response to answer       Results Reviewed: CMP w/ glu 103, alk phos 18, albumin 3.30; CBC w/ Hgb 10.2, Hct 32.7; chest xray = limited d/t positioning, slight worsening in bibasilar L > R airspace disease and post op cardiac silhouette enlargement, may be mild interstitial edema w/o pneumothorax; urinalysis w/ trace ketones/blood/protein; CT abd/pelvis = dilated appendix, no evidence of acute appendicitis; large hiatal hernia; post drea changes w/o free air; bibasilar atelectasis or pneumonia and small effusions    I have personally reviewed most recent indicated data and agree with findings including:  [x]  Laboratory  [x]  Radiology  []  EKG/Telemetry  []  Pathology  []  Cardiac/Vascular Studies  []  Old records  []  Other:  Most pertinent findings include:see above      Results from last 7 days   Lab Units 11/07/20  1803  11/02/20  1346   WBC 10*3/mm3 7.51   < > 6.33   HEMOGLOBIN g/dL 10.2*   < > 12.9*   HEMATOCRIT % 32.7*   < > 41.6   PLATELETS 10*3/mm3 84*   < > 112*   INR   --   --  1.08    < > = values in this interval not displayed.     Results from last 7 days   Lab Units 11/07/20  1803   SODIUM mmol/L 136   POTASSIUM mmol/L 3.5   CHLORIDE mmol/L 100   CO2 mmol/L 25.0   BUN mg/dL 12   CREATININE mg/dL 0.84   GLUCOSE mg/dL 103*   CALCIUM mg/dL 8.7   ALT (SGPT) U/L 18   AST (SGOT) U/L 21   LACTATE mmol/L 1.5     Estimated Creatinine Clearance: 97.5 mL/min (by C-G formula based on SCr of 0.84 mg/dL).  Brief Urine Lab Results  (Last result in the past 365 days)      Color   Clarity   Blood   Leuk Est   Nitrite   Protein   CREAT   Urine HCG        11/07/20 2038 Yellow Clear Trace Negative  Negative Trace             Imaging Results (Last 24 Hours)     Procedure Component Value Units Date/Time    CT Abdomen Pelvis With Contrast [[id number]] Collected: 11/07/20 2350     Updated: 11/07/20 2352    Narrative:      CT Abdomen Pelvis W    INDICATION:   Abdominal pain and fever. Postop patient. Recent cholecystectomy and G-tube placement.    TECHNIQUE:   CT of the abdomen and pelvis with IV contrast. Coronal and sagittal reconstructions were obtained.  Radiation dose reduction techniques included automated exposure control or exposure modulation based on body size. Count of known CT and cardiac nuc med  studies performed in previous 12 months: 0.     COMPARISON:   None available.    FINDINGS:  Abdomen: Small pleural effusions and bibasilar atelectasis or pneumonia. Background emphysema. Large hiatal hernia. Normal caliber aorta and atherosclerotic change. Status post ileac stent graft placement. Spleen adrenal glands and pancreas are negative.  Gallbladder surgically absent. There is some induration of the fat planes in the gallbladder fossa and adjacent to the second portion of the duodenum, likely related to recent surgery. No drainable fluid collection. Probable physiologic prominence of the  extrahepatic common bile duct. G-tube in stomach. Negative kidneys.    Pelvis: Negative bladder and prostate gland. No drainable fluid collection. High riding sigmoid colon. No bowel obstruction or focal inflammatory change of the bowel. There is redundancy of the ascending colon which is positioned in the right upper  quadrant. The appendix is dilated up to 9 mm. No distinct inflammatory change of the appendix otherwise identified but this is technically abnormal and early acute appendicitis could present similarly. Negative inguinal canals. No suspicious bone lesion.      Impression:      1. Postoperative changes of cholecystectomy with induration of the fat planes in the gallbladder fossa without associated  drainable fluid collection to suggest abscess. No free air.  2. The appendix is dilated up to 9 mm. No additional evidence of acute appendicitis but early acute appendicitis is in the differential in the appropriate clinical context.  3. Bibasilar atelectasis or pneumonia and small effusions.  4. Large hiatal hernia.            Signer Name: Andres Luna MD   Signed: 11/7/2020 11:50 PM   Workstation Name: [facility]    [facility]    XR Chest 1 View [[id number]] Collected: 11/07/20 2112     Updated: 11/07/20 2114    Narrative:      CR Chest 1 Vw    INDICATION:   Postcholecystectomy with cough. Patient very kyphotic.     COMPARISON:    Chest x-ray from 11/2/2020.    FINDINGS:  Single portable AP view(s) of the chest.  Film is quite lordotic. The postoperative heart is enlarged. There is airspace disease at the left greater than right lung base. There may be layering pleural fluid on the left. Its difficult to compare current  and prior study due to different positioning. There is probably mild interstitial edema.      Impression:      Study is considerably limited by lordotic positioning. Allowing for this there is probably no interval change to slight worsening in bibasilar left greater than right-sided airspace disease and postoperative cardiac silhouette enlargement. There may be  mild interstitial edema. No pneumothorax. Follow-up recommended.     Signer Name: Frances Medrano MD   Signed: 11/7/2020 9:12 PM   Workstation Name: [facility]    [facility]             Assessment/Plan   Assessment & Plan     Active Hospital Problems    Diagnosis POA   • **Post-op pneumonia [J95.89, J18.9] Yes   • History of throat cancer [Z85.819] Yes   • Chronic pain [G89.29] Unknown   • Dysphagia [R13.10] Yes   • Obstructive sleep apnea syndrome [G47.33] Yes   • Hypertensive disorder [I10] Yes     60 year old male with history of throat cancer s/p surgical resection and radiation w/ severe  resulting kyphosis and chronic dysphagia who had recent lap drea and PEG placement who returns to the ER tonight for evaluation of abdominal pain and fever. Found on CT abd/pelvis to have bilateral bibasilar pneumonia.    Plan:    Post op pneumonia:  - admit to tele  - zosyn IV Q 8 hours  - pharmacy to dose vancomycin  - blood cultures obtained in ER, follow  - respiratory culture ordered  - respiratory PCR w/ COVID r/o ordered  - PT/OT to see for decreased mobility  - NS @ 100 ml/hr x 10 hours  - maintain NPO until cleared by SLP    Post op abdominal pain:  - add simethicone QID x 2 days  - increase bowel regimen (currently on docusate daily, increase to BID)  - ok to continue home Linzess  - ducoalox suppository and milk of mag PRN  - will give suppository x 1 now  - continue reglan, change to IV for 24 hours then back to PO    Dysphagia s/p h/o throat cancer w/ resection and radiation tx:  - will have SLP see patient  - will have nutrition see patient, per wife the patient is agreeable to having 2 cans of tube feeding daily per notes above, will defer to day team to continue per nutrition recs    Home oxygen w/ [place] w/ home BIPAP use:  - BIPAP prn  - continue oxygen, baseline at home 2.5 liters    Seasonal allergies:  - continue antihistamine/decongestant, if patient BP uncontrolled or he becomes tachycardic may need to d/c the decongestant but will continue for now as a chronic home medication    Hypothyroidism:  - check TSH and Hgb A1c in am  - continue levothyroxine replacement    Chronic constipation:  - increase docusate to BID  - may take home Linzess  - add ducalax suppository and milk of mag PRN    HTN / HLD:  - may take home zetia  - continue statin  - continue nitro patch, remove for SBP < 110  - continue metoprolol 25 mg BID, hold for HR < 60 or SBP < 110    Chronic pain / anxiety w/ chronic opiates and benzo use:  - continue home pain medications except fiorcet (oxycodone 20 mg Q 12 hours, percocet 10  "Q 6 hours)  - continue home tizanidine 4mg Q 8 hours PRN, hold for sedation/low BP  - continue gabapentin 400 mg TID, hold for sedation  - continue risperidone 1 mg BID, hold for sedation  - continue trazodone 50 mg am/ 75mg pm, hold for sedation  - continue ativan 2 mg PO Q 6 hours PRN      DVT prophylaxis:  Heparin CS  Patient needs medication simplification.    CODE STATUS:  D/w patient and wife, he wishes to be a full code status  Code Status and Medical Interventions:   Ordered at: 11/08/20 0018     Code Status:    CPR     Medical Interventions (Level of Support Prior to Arrest):    Full       Electronically signed by LEILA Ferreira, 11/08/20, 12:38 AM EST.    Attending   Admission Attestation       I have seen and examined the patient, performing an independent face-to-face diagnostic evaluation with plan of care reviewed and developed with the advanced practice clinician (APC).      Brief Summary Statement:   60 year old male with history of throat cancer s/p surgical resection and radiation w/ severe resulting kyphosis and chronic dysphagia who had recent lap drea and PEG placement who returns to the ER tonight for evaluation of abdominal pain and fever. Found on CT abd/pelvis to have bilateral bibasilar pneumonia.  She reports it hurts to cough and do take a deep breath.  Nursing reports he is not safe to swallow.  He has not used but 1 can of tube feedings since he is been home.  He has been eating and drinking a little.    Remainder of detailed HPI is as noted by APC and has been reviewed and/or edited by me for completeness.    Attending Physical Exam:  /82 (BP Location: Left arm, Patient Position: Lying)   Pulse 87   Temp 98.9 °F (37.2 °C) (Oral)   Resp 22   Ht 157.5 cm (62\")   Wt 107 kg (235 lb 1.6 oz)   SpO2 94%   BMI 43.00 kg/m²     Patient is alert and talkative   Neck is short he is kyphotic  Heart is Reg wo murmur  Lungs are congested with poor inspiratory effort, " tachypneic  Abdomen is soft without HSM or mass, it is tender, he has significant hematomas specially on the right flank  MAEW, no edema  Skin is without rash  Neurologic exam in nonfocal   Mood is appropriate    Data:  I have personally reviewed most recent data most concerning for : Bilateral infiltrates,    Brief Assessment/Plan :  See detailed assessment and plan developed with APC which I have reviewed and/or edited for completeness.    I believe this patient meets INPATIENT status due to respiratory distress due to acute on chronic aspiration, poorly controlled pain, not safe to tolerate p.o. medications will need to be in the hospital until his pneumonia is better controlled and other plans  I feel patient’s risk for adverse outcomes and need for care warrant INPATIENT evaluation and I predict the patient’s care encounter to likely last beyond 2 midnights.      Electronically signed by Chayito Chin MD, 11/08/20, 2:46 AM EST.

## 2020-11-08 NOTE — PROGRESS NOTES
"Pharmacy Consult-Vancomycin Dosing  Sonido Villaseñor is a  60 y.o. male receiving vancomycin therapy.     Indication: pneumonia  Consulting Provider: hospitalist  ID Consult: No    Goal AUC: 400 - 600 mg/L*hr    Current Antimicrobial Therapy  Anti-Infectives (From admission, onward)      Ordered     Dose/Rate Route Frequency Start Stop    11/08/20 0031  Pharmacy to dose vancomycin     Ordering Provider: Ibeth Sandoval APRN     Does not apply Continuous PRN 11/08/20 0030 11/13/20 0029    11/08/20 0004  piperacillin-tazobactam (ZOSYN) 4.5 g in iso-osmotic dextrose 100 mL IVPB (premix)     Ordering Provider: Andres Mondragon PA    4.5 g Intravenous Once 11/08/20 0006 11/08/20 0030            Allergies  Allergies as of 11/07/2020 - Reviewed 11/07/2020   Allergen Reaction Noted    Cefaclor Anaphylaxis 02/24/2005    Ceftriaxone Rash 07/22/2010       Labs  Results from last 7 days   Lab Units 11/07/20  1803 11/06/20  0551 11/02/20  1428   BUN mg/dL 12 11 9   CREATININE mg/dL 0.84 0.79 0.80     Results from last 7 days   Lab Units 11/07/20  1803 11/06/20  0551 11/02/20  1346   WBC 10*3/mm3 7.51 11.75* 6.33     Evaluation of Dosing  Last Dose Received in the ED/Outside Facility:             N/A  Is Patient on Dialysis or Renal Replacement: N/A    Ht - 157.5 cm (62\")  Wt - 107 kg (236 lb)    Estimated Creatinine Clearance: 97.5 mL/min (by C-G formula based on SCr of 0.84 mg/dL).  Intake & Output (last 3 days)       None            Microbiology and Radiology  Microbiology Results (last 10 days)       Procedure Component Value - Date/Time    COVID PRE-OP / PRE-PROCEDURE SCREENING ORDER (NO ISOLATION) - Swab, Nasopharynx [[id number]] Collected: 11/02/20 1559    Lab Status: Final result Specimen: Swab from Nasopharynx Updated: 11/03/20 1116    Narrative:      The following orders were created for panel order COVID PRE-OP / PRE-PROCEDURE SCREENING ORDER (NO ISOLATION) - Swab, Nasopharynx.  Procedure                               " Abnormality         Status                     ---------                               -----------         ------                     COVID-19,[facility], NP ...[[id number]]                      Final result                 Please view results for these tests on the individual orders.    COVID-19,[facility], NP SWAB IN [facility] VIRAL TRANSPORT MEDIA 24-30 HR TAT - Swab, Nasopharynx [[id number]] Collected: 11/02/20 7030    Lab Status: Final result Specimen: Swab from Nasopharynx Updated: 11/03/20 1116     SARS-CoV-2 JASON Not Detected            Reported Vancomycin Levels                   Insight[employer] AUC Calculation:  Current AUC:   -- mg/L*hr    Predicted Steady State AUC on Current Dose: -- mg/L*hr  _________________________________  Predicted Steady State AUC on New Dose:   449 mg/L*hr    Assessment/Plan:  1. Will give vancomycin 2250 mg IV once                                                followed by      Vancomycin 1250 mg IV q 18 hours    2. Vancomycin trough is scheduled 11/10 at 0730 prior to the 4th dose. Predicted Steady State AUC at current dose: 449 mg/L*hr.      Daryl Smith, PharmD, BCPS  11/8/2020  00:38 EST

## 2020-11-08 NOTE — CONSULTS
"                  Clinical Nutrition     Nutrition Support Assessment  Reason for Visit:   Difficulty chewing/swallowing, EN assessment    Patient Name: Sonido Villaseñor  YOB: 1960  MRN: [id number]  Date of Encounter: 11/08/20 08:53 EST  Admission date: 11/7/2020     Comments:   Consult received for EN assessment. Patient with recent PEG placement. Consuming PO and using x2 cartons of Isosource 1.5 daily (provides 750 kcal, 34 grams protein). However, patient is unsure if he desires for nutrition support at this time. Patient and wife expressed concerns about aspiration. Provided patient with information about continuous EN and slow initiation. Patient and wife would like to hold off on starting tube feeding at this time    RD to continue to monitor and available for further guidance/nutrition recommendations as patient and family request    Nutrition Assessment   Assessment     Admission diagnosis  Postop pneumonia    Additional applicable diagnosis/conditions/procedures this adm:  Recent lap drea, PEG placement (11/5)    Applicable PMH/PSxH:   HTN  HLP  GERD  Chronic constipation  RENO  COPD  Tobacco (quit ~2018)  Chronic dysphagia 2/2 throat cancer - XRT (10 years ago)  Kyphosis- per [facility] dietitian notes 11/4 - \"numerous surgeries on his throat related to scar tissue which has led to a 7 inch reduction in his height / wife states that his chin now sits on top of his chest\"  Chronic pain    CABG  Back surgery  \"Numerous throat surgeries/resections\"  Has had x2 prior PEG placements. Last PEG was removed 18-24 months ago 2/2 infection      Reported/Observed/Food/Nutrition Related History:      Significant amount of weight/intake history provided from recent [facility] oncology dietitian note 11/4.      Patient resting at visit. Wife at bedside provides majority of history. She states patient last ate \"meal\" the night before surgery Wednesday, 11/4. He ate an egg and a few bites of toast Friday night 11/6 " "and that was the last intake he has had besides ~60 mL of Isosource 1.5 that same day. Patient awake but did not answer questions/provide history during discussion. Wife reports they declined SLP eval today. Asked wife if patient was interested in initiating EN while here. She reports he was OK to do x2 cans daily prior to this admission but they are worried that it will \"contribute more to the pneumonia.\" Wife states they recently had a swallow eval at  where they \"saw the liquids come back up his esophagus and into his lungs since he does not have an epiglottis.\" RD discussed the importance of nutrition following surgery and to prevent further weakness/lethargy as patient is still working with therapy. Patient reported he is unsure if he wants to start tube feeding at this time. Wife reports her main concern is adequate water/hydration via PEG. Advised patient and wife that recs will be provided and we can initiate EN as patient desires.      Anthropometrics     Height: 157.5 cm (62\") - per wife d/t hx of surgeries, Normal height prior to throat surgeries was 69 inches  Last filed wt: Weight: 107 kg (235 lb 1.6 oz) (11/08/20 0522)  Weight Method: Bed scale    BMI: BMI (Calculated): 43  Obese Class III extreme obesity: > or equal to 40kg/m2    Ideal Body Weight (IBW) (kg): 54.13  Admission wt: 236 lb  Method obtained: weight per charting on admission 11/7 - no method listed    Weight Weight (kg) Weight (lbs) Weight Method   11/2/2020 108.1 kg 238 lb 5.1 oz Standing scale   11/5/2020 108.1 kg 238 lb 5.1 oz    11/7/2020 107.049 kg 236 lb    11/8/2020 106.641 kg 235 lb 1.6 oz Bed scale   11/8/2020 106.641 kg 235 lb 1.6 oz Bed scale     Weight Change   UBW: ~235 lbs  Per [facility] dietitian note 11/4 - \"He has maintained his current weight for the past 6-12 months.\"    Labs reviewed     Results from last 7 days   Lab Units 11/07/20  1803 11/06/20  0551 11/02/20  1428   GLUCOSE mg/dL 103* 137* 107*   BUN mg/dL 12 11 9 "   CREATININE mg/dL 0.84 0.79 0.80   SODIUM mmol/L 136 139 140   CHLORIDE mmol/L 100 104 106   POTASSIUM mmol/L 3.5 3.6 4.2   MAGNESIUM mg/dL 1.8  --   --    ALT (SGPT) U/L 18 23 7     Results from last 7 days   Lab Units 11/07/20  1803 11/06/20  0551 11/02/20  1428   ALBUMIN g/dL 3.30* 3.60 4.10           No results found for: HGBA1C      Medications reviewed   Pertinent: VIT D3, colace, zetia, ferrous sulfate, gabapentin, synthroid, linzess, reglan, oxycodone, risperdal, mylicon, trazodone  GTT: NS @ 100 mL/hr      Intake/Ouptut 24 hrs (7:00AM - 6:59 AM)     Intake & Output (last day)     None        Needs Assessment (11/8)     Height used  157.5 cm (62 in)   Weight used  107 kg (235 lbs)   Obesity adjBW  66.9 kgs  (147 lbs)     Estimated need Method/Equation used Result    Energy/Calorie need  ~1900 kcals/d  16 - 18 g/kg actBW  1712 - 1926 kcal             Protein   ~115 g/day  1.0 - 1.2 g/kg actBW    107 - 128 kg           Fiber     Fluid  2000 - 2100 mL 20 - 22 mL/kg actBW  25 - 30 mL/kg obesity adjustedBW  2140 mL   1672 - 2007 mL            Current Nutrition Prescription     PO: NPO Diet  No active supplement orders      EN to provide at goal volume  Intake/Evaluation of Received Nutrient/Fluid Intake last day 7090-7556:     At Target Goal Volume    % Est needs Received per I/O's    % Est needs   Volume  ml        Modulars         Energy/kcals kcals % kcals %   Protein  g pro % g pro %                 Fiber       Fluid   W/Free water ml  ml                    Nutrition Diagnosis     11/8  Problem Inadequate oral intake   Etiology Clinical status   Signs/Symptoms NPO     11/8  Problem Swallowing difficulty   Etiology Dysphagia   Signs/Symptoms Reported history of high risk for aspiration, per family report declined SLP Eval       Nutrition Intervention     1.  Follow treatment progress, Care plan reviewed  2. Patient is undecided on initiation of EN while inpatient at this time. Provided recommendations for  patient and wife for continuous EN using home formula    If patient OK with EN initiation, RD recommends Isosource 1.5 to start at 20 mL and advance as tolerated by 20 mL Q6Hrs to goal rate of 60 mL/hr. TGV = 1320 mL/day. Free water at 45 mL/hr.    3. Monitor GOC discussions and patient wishes for nutrition plan of care      Goal:   General: Nutrition support treatment  EN/PN: Initiate EN as patient desires      Monitoring/Evaluation:   Per protocol, I&O, Pertinent labs, EN delivery/tolerance, Weight, GI status, Symptoms, POC/GOC, Swallow function      Will Continue to follow per protocol      Cristal Shi RDN, LD  Time Spent: 60 minutes

## 2020-11-08 NOTE — PROGRESS NOTES
[facility] Medicine Services  ADMISSION FOLLOW-UP NOTE          Patient admitted after midnight, H&P by my partner performed earlier on today's date reviewed.  Interim findings, labs, and charting also reviewed.        [facility] Hospital Problem List has been managed and updated to include any new diagnoses:  Active Hospital Problems    Diagnosis  POA   • **Post-op pneumonia [J95.89, J18.9]  Yes   • History of throat cancer [Z85.819]  Yes   • Chronic pain [G89.29]  Unknown   • Dysphagia [R13.10]  Yes   • Obstructive sleep apnea syndrome [G47.33]  Yes   • Hypertensive disorder [I10]  Yes      Resolved Hospital Problems   No resolved problems to display.         ADDITIONAL PLAN:  - detailed assessment and plan from admission reviewed  -I spoke with patient and wife about treatment goals and plans     60 year old male with history of throat cancer s/p surgical resection and radiation w/ severe resulting kyphosis and chronic dysphagia who had recent lap drea and PEG placement. Discharged 11/6 and  returned with  abdominal pain and fever. Found on CT abd/pelvis to have bilateral bibasilar pneumonia.     Post op pneumonia:  - zosyn IV Q 8 hours  - pharmacy to dose vancomycin  - blood cultures pending   - respiratory culture pending   - respiratory PCR w/ COVID negative   - procal elevated at 0.63  -SLP evaluated and he is aspirating, but this is not new. We need to use PEG while here and he needs to be NPO. It sounds as if the wife is consistently feeding by mouth      Post op abdominal pain:  - added simethicone QID x 2 days  - aggressive bowel regimen including relistor   - ducoalox suppository and milk of mag PRN  - continue reglan, change to IV for 24 hours then back to PO  -general surgery consulted, Dr Fajardo performed lap CCY and PEG placement, nothing surgical this admission       Dysphagia s/p h/o throat cancer w/ resection and radiation tx:  - will have nutrition see patient     Home  oxygen w/ RENO w/ home BIPAP use:  - BIPAP prn  - continue oxygen, baseline at home 2.5 liters     Seasonal allergies:  - continue antihistamine/decongestant, if patient BP uncontrolled or he becomes tachycardic may need to d/c the decongestant but will continue for now as a chronic home medication     Hypothyroidism:  - continue levothyroxine replacement        HTN / HLD:  - may take home zetia  - continue statin  - continue nitro patch, remove for SBP < 110  - continue metoprolol 25 mg BID, hold for HR < 60 or SBP < 110     Chronic pain / anxiety w/ chronic opiates and benzo use:  - continue home pain medications except fiorcet (oxycodone 20 mg Q 12 hours, percocet 10 Q 6 hours)  - continue home tizanidine 4mg Q 8 hours PRN, hold for sedation/low BP  - continue gabapentin 400 mg TID, hold for sedation  - continue risperidone 1 mg BID, hold for sedation  - continue trazodone 50 mg am/ 75mg pm, hold for sedation  - continue ativan 2 mg PO Q 6 hours PRN    Kenya Cisse,   11/08/20

## 2020-11-09 LAB
ANION GAP SERPL CALCULATED.3IONS-SCNC: 12 MMOL/L (ref 5–15)
ARTERIAL PATENCY WRIST A: ABNORMAL
ATMOSPHERIC PRESS: ABNORMAL MM[HG]
BASE EXCESS BLDA CALC-SCNC: -1.9 MMOL/L (ref 0–2)
BASOPHILS # BLD AUTO: 0.02 10*3/MM3 (ref 0–0.2)
BASOPHILS NFR BLD AUTO: 0.4 % (ref 0–1.5)
BDY SITE: ABNORMAL
BODY TEMPERATURE: 37 C
BUN SERPL-MCNC: 7 MG/DL (ref 8–23)
BUN/CREAT SERPL: 9.9 (ref 7–25)
CALCIUM SPEC-SCNC: 8.1 MG/DL (ref 8.6–10.5)
CHLORIDE SERPL-SCNC: 105 MMOL/L (ref 98–107)
CO2 BLDA-SCNC: 24.4 MMOL/L (ref 22–33)
CO2 SERPL-SCNC: 21 MMOL/L (ref 22–29)
COHGB MFR BLD: 0.3 % (ref 0–2)
CREAT SERPL-MCNC: 0.71 MG/DL (ref 0.76–1.27)
DEPRECATED RDW RBC AUTO: 45.9 FL (ref 37–54)
DEPRECATED RDW RBC AUTO: 46.6 FL (ref 37–54)
EOSINOPHIL # BLD AUTO: 0.12 10*3/MM3 (ref 0–0.4)
EOSINOPHIL NFR BLD AUTO: 2.3 % (ref 0.3–6.2)
EPAP: 0
ERYTHROCYTE [DISTWIDTH] IN BLOOD BY AUTOMATED COUNT: 13 % (ref 12.3–15.4)
ERYTHROCYTE [DISTWIDTH] IN BLOOD BY AUTOMATED COUNT: 13 % (ref 12.3–15.4)
GFR SERPL CREATININE-BSD FRML MDRD: 112 ML/MIN/1.73
GFR SERPL CREATININE-BSD FRML MDRD: 136 ML/MIN/1.73
GLUCOSE BLDC GLUCOMTR-MCNC: 100 MG/DL (ref 70–130)
GLUCOSE SERPL-MCNC: 94 MG/DL (ref 65–99)
HCO3 BLDA-SCNC: 23.2 MMOL/L (ref 20–26)
HCT VFR BLD AUTO: 30.3 % (ref 37.5–51)
HCT VFR BLD AUTO: 31 % (ref 37.5–51)
HCT VFR BLD CALC: 31.1 %
HGB BLD-MCNC: 9.6 G/DL (ref 13–17.7)
HGB BLD-MCNC: 9.7 G/DL (ref 13–17.7)
HGB BLDA-MCNC: 10.1 G/DL (ref 13.5–17.5)
IMM GRANULOCYTES # BLD AUTO: 0.06 10*3/MM3 (ref 0–0.05)
IMM GRANULOCYTES NFR BLD AUTO: 1.1 % (ref 0–0.5)
INHALED O2 CONCENTRATION: 28 %
IPAP: 0
LYMPHOCYTES # BLD AUTO: 0.3 10*3/MM3 (ref 0.7–3.1)
LYMPHOCYTES NFR BLD AUTO: 5.7 % (ref 19.6–45.3)
MCH RBC QN AUTO: 30.6 PG (ref 26.6–33)
MCH RBC QN AUTO: 31.1 PG (ref 26.6–33)
MCHC RBC AUTO-ENTMCNC: 31.3 G/DL (ref 31.5–35.7)
MCHC RBC AUTO-ENTMCNC: 31.7 G/DL (ref 31.5–35.7)
MCV RBC AUTO: 97.8 FL (ref 79–97)
MCV RBC AUTO: 98.1 FL (ref 79–97)
METHGB BLD QL: 0.8 % (ref 0–1.5)
MODALITY: ABNORMAL
MONOCYTES # BLD AUTO: 0.45 10*3/MM3 (ref 0.1–0.9)
MONOCYTES NFR BLD AUTO: 8.5 % (ref 5–12)
MRSA DNA SPEC QL NAA+PROBE: NEGATIVE
NEUTROPHILS NFR BLD AUTO: 4.33 10*3/MM3 (ref 1.7–7)
NEUTROPHILS NFR BLD AUTO: 82 % (ref 42.7–76)
NOTE: ABNORMAL
NRBC BLD AUTO-RTO: 0 /100 WBC (ref 0–0.2)
OXYHGB MFR BLDV: 90.7 % (ref 94–99)
PAW @ PEAK INSP FLOW SETTING VENT: 0 CMH2O
PCO2 BLDA: 39.8 MM HG (ref 35–45)
PCO2 TEMP ADJ BLD: 39.8 MM HG (ref 35–48)
PH BLDA: 7.37 PH UNITS (ref 7.35–7.45)
PH, TEMP CORRECTED: 7.37 PH UNITS
PHOSPHATE SERPL-MCNC: 1.3 MG/DL (ref 2.5–4.5)
PHOSPHATE SERPL-MCNC: 1.6 MG/DL (ref 2.5–4.5)
PLATELET # BLD AUTO: 102 10*3/MM3 (ref 140–450)
PLATELET # BLD AUTO: 89 10*3/MM3 (ref 140–450)
PMV BLD AUTO: 11.2 FL (ref 6–12)
PMV BLD AUTO: 11.3 FL (ref 6–12)
PO2 BLDA: 64.7 MM HG (ref 83–108)
PO2 TEMP ADJ BLD: 64.7 MM HG (ref 83–108)
POTASSIUM SERPL-SCNC: 3.4 MMOL/L (ref 3.5–5.2)
POTASSIUM SERPL-SCNC: 3.6 MMOL/L (ref 3.5–5.2)
RBC # BLD AUTO: 3.09 10*6/MM3 (ref 4.14–5.8)
RBC # BLD AUTO: 3.17 10*6/MM3 (ref 4.14–5.8)
SODIUM SERPL-SCNC: 138 MMOL/L (ref 136–145)
TOTAL RATE: 0 BREATHS/MINUTE
VENTILATOR MODE: ABNORMAL
WBC # BLD AUTO: 4.53 10*3/MM3 (ref 3.4–10.8)
WBC # BLD AUTO: 5.28 10*3/MM3 (ref 3.4–10.8)

## 2020-11-09 PROCEDURE — G0378 HOSPITAL OBSERVATION PER HR: HCPCS

## 2020-11-09 PROCEDURE — 80048 BASIC METABOLIC PNL TOTAL CA: CPT | Performed by: FAMILY MEDICINE

## 2020-11-09 PROCEDURE — 94799 UNLISTED PULMONARY SVC/PX: CPT

## 2020-11-09 PROCEDURE — 25010000002 VANCOMYCIN 10 G RECONSTITUTED SOLUTION: Performed by: NURSE PRACTITIONER

## 2020-11-09 PROCEDURE — 96372 THER/PROPH/DIAG INJ SC/IM: CPT

## 2020-11-09 PROCEDURE — 25010000002 PIPERACILLIN SOD-TAZOBACTAM PER 1 G: Performed by: NURSE PRACTITIONER

## 2020-11-09 PROCEDURE — 97165 OT EVAL LOW COMPLEX 30 MIN: CPT

## 2020-11-09 PROCEDURE — 99225 PR SBSQ OBSERVATION CARE/DAY 25 MINUTES: CPT | Performed by: INTERNAL MEDICINE

## 2020-11-09 PROCEDURE — 25010000002 HEPARIN (PORCINE) PER 1000 UNITS: Performed by: NURSE PRACTITIONER

## 2020-11-09 PROCEDURE — 85025 COMPLETE CBC W/AUTO DIFF WBC: CPT | Performed by: FAMILY MEDICINE

## 2020-11-09 PROCEDURE — 82962 GLUCOSE BLOOD TEST: CPT

## 2020-11-09 PROCEDURE — 85027 COMPLETE CBC AUTOMATED: CPT | Performed by: SURGERY

## 2020-11-09 PROCEDURE — 84100 ASSAY OF PHOSPHORUS: CPT | Performed by: INTERNAL MEDICINE

## 2020-11-09 PROCEDURE — 87641 MR-STAPH DNA AMP PROBE: CPT

## 2020-11-09 PROCEDURE — 84132 ASSAY OF SERUM POTASSIUM: CPT | Performed by: INTERNAL MEDICINE

## 2020-11-09 RX ORDER — LORAZEPAM 1 MG/1
1 TABLET ORAL EVERY 6 HOURS PRN
Status: DISCONTINUED | OUTPATIENT
Start: 2020-11-09 | End: 2020-11-11 | Stop reason: HOSPADM

## 2020-11-09 RX ORDER — ESOMEPRAZOLE MAGNESIUM 40 MG/1
40 CAPSULE, DELAYED RELEASE ORAL EVERY 12 HOURS SCHEDULED
Status: DISCONTINUED | OUTPATIENT
Start: 2020-11-09 | End: 2020-11-11 | Stop reason: HOSPADM

## 2020-11-09 RX ORDER — BISACODYL 10 MG
10 SUPPOSITORY, RECTAL RECTAL DAILY
Status: DISCONTINUED | OUTPATIENT
Start: 2020-11-09 | End: 2020-11-11 | Stop reason: HOSPADM

## 2020-11-09 RX ADMIN — RISPERIDONE 1 MG: 1 TABLET ORAL at 09:24

## 2020-11-09 RX ADMIN — SIMETHICONE 40 MG: 20 EMULSION ORAL at 12:49

## 2020-11-09 RX ADMIN — OXYCODONE 10 MG: 5 TABLET ORAL at 23:26

## 2020-11-09 RX ADMIN — POTASSIUM CHLORIDE 40 MEQ: 10 CAPSULE, COATED, EXTENDED RELEASE ORAL at 09:23

## 2020-11-09 RX ADMIN — ESOMEPRAZOLE MAGNESIUM 40 MG: 40 CAPSULE, DELAYED RELEASE ORAL at 20:54

## 2020-11-09 RX ADMIN — ASPIRIN 81 MG CHEWABLE TABLET 81 MG: 81 TABLET CHEWABLE at 09:24

## 2020-11-09 RX ADMIN — SIMETHICONE 40 MG: 20 EMULSION ORAL at 20:53

## 2020-11-09 RX ADMIN — OXYCODONE HYDROCHLORIDE AND ACETAMINOPHEN 1 TABLET: 10; 325 TABLET ORAL at 04:12

## 2020-11-09 RX ADMIN — LEVOTHYROXINE SODIUM 88 MCG: 88 TABLET ORAL at 05:45

## 2020-11-09 RX ADMIN — IPRATROPIUM BROMIDE AND ALBUTEROL SULFATE 3 ML: 2.5; .5 SOLUTION RESPIRATORY (INHALATION) at 15:26

## 2020-11-09 RX ADMIN — ROSUVASTATIN CALCIUM 10 MG: 10 TABLET, COATED ORAL at 01:16

## 2020-11-09 RX ADMIN — CETIRIZINE HCL, PSEUDOEPHEDRINE HCL 1 TABLET: 5; 120 TABLET, EXTENDED RELEASE ORAL at 20:53

## 2020-11-09 RX ADMIN — LANSOPRAZOLE 30 MG: KIT at 05:45

## 2020-11-09 RX ADMIN — POTASSIUM CHLORIDE 40 MEQ: 1.5 FOR SOLUTION ORAL at 16:19

## 2020-11-09 RX ADMIN — TAZOBACTAM SODIUM AND PIPERACILLIN SODIUM 3.38 G: 375; 3 INJECTION, SOLUTION INTRAVENOUS at 00:41

## 2020-11-09 RX ADMIN — METOPROLOL TARTRATE 25 MG: 25 TABLET, FILM COATED ORAL at 09:24

## 2020-11-09 RX ADMIN — GUAIFENESIN 200 MG: 200 SOLUTION ORAL at 15:22

## 2020-11-09 RX ADMIN — TOPIRAMATE 50 MG: 25 TABLET, FILM COATED ORAL at 09:24

## 2020-11-09 RX ADMIN — SODIUM CHLORIDE, PRESERVATIVE FREE 10 ML: 5 INJECTION INTRAVENOUS at 00:42

## 2020-11-09 RX ADMIN — IPRATROPIUM BROMIDE AND ALBUTEROL SULFATE 3 ML: 2.5; .5 SOLUTION RESPIRATORY (INHALATION) at 12:01

## 2020-11-09 RX ADMIN — METOCLOPRAMIDE 10 MG: 10 TABLET ORAL at 16:23

## 2020-11-09 RX ADMIN — RISPERIDONE 1 MG: 1 TABLET ORAL at 20:53

## 2020-11-09 RX ADMIN — SIMETHICONE 40 MG: 20 EMULSION ORAL at 17:27

## 2020-11-09 RX ADMIN — HEPARIN SODIUM 5000 UNITS: 5000 INJECTION INTRAVENOUS; SUBCUTANEOUS at 05:45

## 2020-11-09 RX ADMIN — SIMETHICONE 40 MG: 20 EMULSION ORAL at 09:26

## 2020-11-09 RX ADMIN — TAZOBACTAM SODIUM AND PIPERACILLIN SODIUM 3.38 G: 375; 3 INJECTION, SOLUTION INTRAVENOUS at 09:23

## 2020-11-09 RX ADMIN — TRAZODONE HYDROCHLORIDE 50 MG: 50 TABLET ORAL at 20:52

## 2020-11-09 RX ADMIN — CETIRIZINE HCL, PSEUDOEPHEDRINE HCL 1 TABLET: 5; 120 TABLET, EXTENDED RELEASE ORAL at 09:25

## 2020-11-09 RX ADMIN — METOPROLOL TARTRATE 25 MG: 25 TABLET, FILM COATED ORAL at 20:53

## 2020-11-09 RX ADMIN — METOCLOPRAMIDE 10 MG: 10 TABLET ORAL at 20:53

## 2020-11-09 RX ADMIN — GABAPENTIN 600 MG: 300 CAPSULE ORAL at 16:19

## 2020-11-09 RX ADMIN — TRAZODONE HYDROCHLORIDE 50 MG: 50 TABLET ORAL at 09:24

## 2020-11-09 RX ADMIN — TAZOBACTAM SODIUM AND PIPERACILLIN SODIUM 3.38 G: 375; 3 INJECTION, SOLUTION INTRAVENOUS at 23:26

## 2020-11-09 RX ADMIN — NITROGLYCERIN 1 PATCH: 0.4 PATCH TRANSDERMAL at 09:26

## 2020-11-09 RX ADMIN — LORAZEPAM 2 MG: 1 TABLET ORAL at 09:32

## 2020-11-09 RX ADMIN — IPRATROPIUM BROMIDE AND ALBUTEROL SULFATE 3 ML: 2.5; .5 SOLUTION RESPIRATORY (INHALATION) at 19:29

## 2020-11-09 RX ADMIN — TAZOBACTAM SODIUM AND PIPERACILLIN SODIUM 3.38 G: 375; 3 INJECTION, SOLUTION INTRAVENOUS at 16:19

## 2020-11-09 RX ADMIN — POTASSIUM & SODIUM PHOSPHATES POWDER PACK 280-160-250 MG 2 PACKET: 280-160-250 PACK at 09:25

## 2020-11-09 RX ADMIN — IPRATROPIUM BROMIDE AND ALBUTEROL SULFATE 3 ML: 2.5; .5 SOLUTION RESPIRATORY (INHALATION) at 06:41

## 2020-11-09 RX ADMIN — GABAPENTIN 600 MG: 300 CAPSULE ORAL at 09:24

## 2020-11-09 RX ADMIN — DOCUSATE SODIUM 100 MG: 50 LIQUID ORAL at 20:52

## 2020-11-09 RX ADMIN — Medication 2000 UNITS: at 09:24

## 2020-11-09 RX ADMIN — METOCLOPRAMIDE 10 MG: 10 TABLET ORAL at 09:24

## 2020-11-09 RX ADMIN — TOPIRAMATE 50 MG: 25 TABLET, FILM COATED ORAL at 20:53

## 2020-11-09 RX ADMIN — VANCOMYCIN HYDROCHLORIDE 1250 MG: 10 INJECTION, POWDER, LYOPHILIZED, FOR SOLUTION INTRAVENOUS at 15:22

## 2020-11-09 RX ADMIN — ESCITALOPRAM OXALATE 20 MG: 20 TABLET ORAL at 09:24

## 2020-11-09 RX ADMIN — OXYCODONE 10 MG: 5 TABLET ORAL at 00:41

## 2020-11-09 RX ADMIN — OXYCODONE 10 MG: 5 TABLET ORAL at 17:27

## 2020-11-09 RX ADMIN — LORAZEPAM 2 MG: 1 TABLET ORAL at 00:41

## 2020-11-09 RX ADMIN — LORAZEPAM 1 MG: 1 TABLET ORAL at 17:27

## 2020-11-09 RX ADMIN — DOCUSATE SODIUM 100 MG: 50 LIQUID ORAL at 00:41

## 2020-11-09 RX ADMIN — OXYCODONE 10 MG: 5 TABLET ORAL at 05:45

## 2020-11-09 RX ADMIN — ESCITALOPRAM OXALATE 20 MG: 20 TABLET ORAL at 20:53

## 2020-11-09 RX ADMIN — TIZANIDINE 4 MG: 4 TABLET ORAL at 16:20

## 2020-11-09 RX ADMIN — METOCLOPRAMIDE 10 MG: 10 TABLET ORAL at 12:47

## 2020-11-09 RX ADMIN — GABAPENTIN 600 MG: 300 CAPSULE ORAL at 20:53

## 2020-11-09 RX ADMIN — OXYCODONE 10 MG: 5 TABLET ORAL at 12:47

## 2020-11-09 RX ADMIN — GUAIFENESIN 200 MG: 200 SOLUTION ORAL at 20:52

## 2020-11-09 RX ADMIN — ROSUVASTATIN CALCIUM 10 MG: 10 TABLET, COATED ORAL at 20:53

## 2020-11-09 RX ADMIN — ACETAMINOPHEN ORAL SOLUTION 650 MG: 650 SOLUTION ORAL at 09:32

## 2020-11-09 RX ADMIN — MINERAL SUPPLEMENT IRON 300 MG / 5 ML STRENGTH LIQUID 100 PER BOX UNFLAVORED 300 MG: at 09:25

## 2020-11-09 RX ADMIN — POTASSIUM & SODIUM PHOSPHATES POWDER PACK 280-160-250 MG 2 PACKET: 280-160-250 PACK at 22:18

## 2020-11-09 NOTE — THERAPY EVALUATION
"Patient Name: Sonido Villaseñor  : 1960    MRN: [id number]                              Today's Date: 2020       Admit Date: 2020    Visit Dx:     ICD-10-CM ICD-9-CM   1. Post-op pneumonia  J95.89 997.39    J18.9 486   2. Post-procedural fever  R50.82 780.62   3. Generalized abdominal pain  R10.84 789.07   4. Dysphagia, unspecified type  R13.10 787.20     Patient Active Problem List   Diagnosis   • Coronary arteriosclerosis in native artery   • Chronic obstructive lung disease (CMS/HCC)   • Diaphragmatic hernia   • Dysphagia   • Hypertensive disorder   • Familial combined hyperlipidemia   • Obstructive sleep apnea syndrome   • Primary malignant neoplasm of supraglottis (CMS/HCC)   • Restrictive lung disease due to kyphoscoliosis   • Squamous cell carcinoma of larynx (CMS/HCC)   • Symptomatic cholelithiasis   • Post-op pneumonia   • History of throat cancer   • Chronic pain     Past Medical History:   Diagnosis Date   • ARDS (adult respiratory distress syndrome) (CMS/HCC)    • Arthritis    • Cancer (CMS/HCC)     throat cancer    • Chest pain    • Constipation    • COPD (chronic obstructive pulmonary disease) (CMS/HCC)    • Dizzy    • Dry skin    • Full dentures    • Fungus infection     left thumb- oral antibx use    • GERD (gastroesophageal reflux disease)    • Hard to intubate    • Headache    • Hiatal hernia     still present    • Hypertension    • Migraine    • Nausea and vomiting    • On home oxygen therapy     2-2.5 prn and nightly    • Sleep apnea     bipap machine compliant - \"suped up bipap\"     • SOBOE (shortness of breath on exertion)    • Swallowing difficulty     larger items - from radiation issue    • Wears glasses     readers     Past Surgical History:   Procedure Laterality Date   • BACK SURGERY     • CATARACT EXTRACTION      bilat    • CHOLECYSTECTOMY WITH INTRAOPERATIVE CHOLANGIOGRAM N/A 2020    Procedure: CHOLECYSTECTOMY LAPAROSCOPIC , IOC;  Surgeon: Kevin Fajardo MD;  " Location:  [facility];  Service: General;  Laterality: N/A;   • COLONOSCOPY     • CORONARY ARTERY BYPASS GRAFT      x4    • CYST REMOVAL      right hand ganglion cyst    • ENDOSCOPY     • ENDOSCOPY W/ PEG TUBE PLACEMENT N/A 11/5/2020    Procedure: ESOPHAGOGASTRODUODENOSCOPY WITH PERCUTANEOUS ENDOSCOPIC GASTROSTOMY TUBE INSERTION;  Surgeon: Kevin Fajardo MD;  Location:  [facility];  Service: General;  Laterality: N/A;   • THROAT SURGERY      x2   • TONSILLECTOMY       General Information     Row Name 11/09/20 0755          OT Time and Intention    Document Type  evaluation  -AC     Mode of Treatment  occupational therapy  -AC     Row Name 11/09/20 0755          General Information    Patient Profile Reviewed  yes  -AC     Prior Level of Function  independent:;all household mobility;feeding;grooming;min assist:;bathing;mod assist:;dressing wife assists with shower transfer and LBD  -     Existing Precautions/Restrictions  fall;oxygen therapy device and L/min;other (see comments) Lap drea and PEG tube placement 11/04  -     Barriers to Rehab  previous functional deficit  -AC     Row Name 11/09/20 0755          Living Environment    Lives With  spouse  -     Row Name 11/09/20 [facility]          Home Main Entrance    Number of Stairs, Main Entrance  -- ramp  -AC     Row Name 11/09/20 0755          Stairs Within Home, Primary    Stairs, Within Home, Primary  1 step from kitchen to bedfoom  -AC     Number of Stairs, Within Home, Primary  one;none  -AC     Stairs Comment, Within Home, Primary  has basement and  2nd story, but does not need to access  -AC     Row Name 11/09/20 0755          Cognition    Orientation Status (Cognition)  oriented x 3  -AC     Row Name 11/09/20 0755          Safety Issues, Functional Mobility    Safety Issues Affecting Function (Mobility)  safety precaution awareness;safety precautions follow-through/compliance;insight into deficits/self-awareness  -     Impairments Affecting Function  (Mobility)  balance;endurance/activity tolerance;shortness of breath;strength  -       User Key  (r) = Recorded By, (t) = Taken By, (c) = Cosigned By    Initials Name Provider Type    Ibeth Cotto, OT Occupational Therapist        Mobility/ADL's     Row Name 11/09/20 [facility]5          Bed Mobility    Supine-Sit [place] (Bed Mobility)  minimum assist (75% patient effort);1 person assist;verbal cues  -     Sit-Supine [place] (Bed Mobility)  1 person assist;verbal cues;minimum assist (75% patient effort)  -     Assistive Device (Bed Mobility)  bed rails;head of bed elevated  -     Row Name 11/09/20 [facility]5          Transfers    Transfers  sit-stand transfer  -     Sit-Stand Union (Transfers)  contact guard;1 person assist;verbal cues  -     Row Name 11/09/20 [facility]5          Sit-Stand Transfer    Assistive Device (Sit-Stand Transfers)  walker, front-wheeled  -     Row Name 11/09/20 [facility]5          Functional Mobility    Functional Mobility- Ind. Level  contact guard assist  -     Functional Mobility- Device  rolling walker  -     Functional Mobility-Distance (Feet)  30  -     Functional Mobility- Safety Issues  supplemental O2  -     Functional Mobility- Comment  Pt with 1 LOB R which required min A to recover  -     Row Name 11/09/20 [facility]5          Activities of Daily Living    BADL Assessment/Intervention  lower body dressing  -     Row Name 11/09/20 [facility]5          Lower Body Dressing Assessment/Training    [place] Level (Lower Body Dressing)  don;socks  -     Position (Lower Body Dressing)  edge of bed sitting  -     Comment (Lower Body Dressing)  Ptt declined to attempt stating wife assists; pt declined AE trial  -       User Key  (r) = Recorded By, (t) = Taken By, (c) = Cosigned By    Initials Name Provider Type    Ibeth Cotto, OT Occupational Therapist        Obj/Interventions     Row Name 11/09/20 0755          Sensory Assessment (Somatosensory)    Sensory  Assessment (Somatosensory)  UE sensation intact;bilateral UE  -AC     Row Name 11/09/20 0755          Range of Motion Comprehensive    General Range of Motion  bilateral upper extremity ROM WNL  -AC     Comment, General Range of Motion  limited neck range  -AC     Row Name 11/09/20 0755          Strength Comprehensive (MMT)    Comment, General Manual Muscle Testing (MMT) Assessment  BUE grossly 4-/5  -AC       User Key  (r) = Recorded By, (t) = Taken By, (c) = Cosigned By    Initials Name Provider Type    AC Ibeth Quigley OT Occupational Therapist        Goals/Plan     Row Name 11/09/20 0755          Bed Mobility Goal 1 (OT)    Activity/Assistive Device (Bed Mobility Goal 1, OT)  sit to supine;supine to sit  -AC     [place] Level/Cues Needed (Bed Mobility Goal 1, OT)  supervision required  -AC     Time Frame (Bed Mobility Goal 1, OT)  by discharge  -AC     Progress/Outcomes (Bed Mobility Goal 1, OT)  goal ongoing  -     Row Name 11/09/20 0755          Transfer Goal 1 (OT)    Activity/Assistive Device (Transfer Goal 1, OT)  bed-to-chair/chair-to-bed;toilet;walker, rolling  -AC     [place] Level/Cues Needed (Transfer Goal 1, OT)  supervision required  -AC     Time Frame (Transfer Goal 1, OT)  by discharge  -AC     Progress/Outcome (Transfer Goal 1, OT)  goal ongoing  -     Row Name 11/09/20 0755          Toileting Goal 1 (OT)    Activity/Device (Toileting Goal 1, OT)  adjust/manage clothing;perform perineal hygiene  -AC     [place] Level/Cues Needed (Toileting Goal 1, OT)  supervision required  -AC     Time Frame (Toileting Goal 1, OT)  by discharge  -AC     Progress/Outcome (Toileting Goal 1, OT)  goal ongoing  -     Row Name 11/09/20 0755          Strength Goal 1 (OT)    Strength Goal 1 (OT)  Pt will complete 15 reps BUE daily as needed to increase strength and endurance to support ADLs  -AC     Time Frame (Strength Goal 1, OT)  by discharge  -AC     Progress/Outcome (Strength Goal 1, OT)   goal ongoing  -     Row Name 11/09/20 0755          Therapy Assessment/Plan (OT)    Planned Therapy Interventions (OT)  activity tolerance training;adaptive equipment training;BADL retraining;functional balance retraining;occupation/activity based interventions;transfer/mobility retraining;patient/caregiver education/training  -       User Key  (r) = Recorded By, (t) = Taken By, (c) = Cosigned By    Initials Name Provider Type     Ibeth Quigley, OT Occupational Therapist        Clinical Impression     Row Name 11/09/20 0755          Pain Scale: Numbers Pre/Post-Treatment    Pretreatment Pain Rating  3/10  -AC     Posttreatment Pain Rating  3/10  -AC     Pain Location  abdomen  -     Pain Intervention(s)  Repositioned  -     Row Name 11/09/20 0755          Plan of Care Review    Outcome Summary  OT eval complete.  Pt presents with weakness and decreased activity tolerance limiting independence with self care.   Pt required min A supine to sit, CGA STS,  CGA to ambulate 30 ft with RW.  Pt dependent to don socks;  pt reports wife usually assists and declines AE for LBD.  Pt's expected UB self care performance setup/ min A,  LB self care max A.   OT will follow to advance pt toward increased independence with ADLs.  Recommend home with assist and HHOT.  -     Row Name 11/09/20 0755          Therapy Assessment/Plan (OT)    Rehab Potential (OT)  good, to achieve stated therapy goals  -     Therapy Frequency (OT)  daily  -     Row Name 11/09/20 0755          Therapy Plan Review/Discharge Plan (OT)    Anticipated Discharge Disposition (OT)  home with assist;home with home health  -     Row Name 11/09/20 0755          Vital Signs    Pre Systolic BP Rehab  173  -AC     Pre Treatment Diastolic BP  93  -AC     Post Systolic BP Rehab  194  -AC     Post Treatment Diastolic BP  97  -AC     Pretreatment Heart Rate (beats/min)  71  -AC     Posttreatment Heart Rate (beats/min)  75  -AC     Pre SpO2 (%)  9  -AC      O2 Delivery Pre Treatment  supplemental O2  -AC     Intra SpO2 (%)  93  -AC     O2 Delivery Intra Treatment  supplemental O2  -AC     Post SpO2 (%)  95  -AC     O2 Delivery Post Treatment  supplemental O2  -AC     Pre Patient Position  Supine  -AC     Intra Patient Position  Standing  -AC     Post Patient Position  Supine  -AC     Row Name 11/09/20 0755          Positioning and Restraints    Pre-Treatment Position  in bed  -AC     Post Treatment Position  bed  -AC     In Bed  supine;call light within reach;encouraged to call for assist;exit alarm on  -AC       User Key  (r) = Recorded By, (t) = Taken By, (c) = Cosigned By    Initials Name Provider Type    Ibeth Cotto, OT Occupational Therapist        Outcome Measures     Row Name 11/09/20 0755          How much help from another is currently needed...    Putting on and taking off regular lower body clothing?  2  -AC     Bathing (including washing, rinsing, and drying)  2  -AC     Toileting (which includes using toilet bed pan or urinal)  2  -AC     Putting on and taking off regular upper body clothing  3  -AC     Taking care of personal grooming (such as brushing teeth)  3  -AC     Eating meals  1 NPO  -AC     AM-PAC 6 Clicks Score (OT)  13  -AC     Row Name 11/09/20 0755          Functional Assessment    Outcome Measure Options  AM-PAC 6 Clicks Daily Activity (OT)  -AC       User Key  (r) = Recorded By, (t) = Taken By, (c) = Cosigned By    Initials Name Provider Type    Ibeth Cotto, BELKYS Occupational Therapist        Occupational Therapy Education                 Title: PT OT SLP Therapies (In Progress)     Topic: Occupational Therapy (In Progress)     Point: ADL training (Done)     Description:   Instruct learner(s) on proper safety adaptation and remediation techniques during self care or transfers.   Instruct in proper use of assistive devices.              Learning Progress Summary           Patient Acceptance, E, VU by EVE at 11/9/2020 0755     Comment: benefits of activity, role of OT                   Point: Home exercise program (Not Started)     Description:   Instruct learner(s) on appropriate technique for monitoring, assisting and/or progressing therapeutic exercises/activities.              Learner Progress:  Not documented in this visit.          Point: Precautions (Not Started)     Description:   Instruct learner(s) on prescribed precautions during self-care and functional transfers.              Learner Progress:  Not documented in this visit.          Point: Body mechanics (Not Started)     Description:   Instruct learner(s) on proper positioning and spine alignment during self-care, functional mobility activities and/or exercises.              Learner Progress:  Not documented in this visit.                      User Key     Initials Effective Dates Name Provider Type Discipline     06/23/15 -  Ibeth Quigley, OT Occupational Therapist OT              OT Recommendation and Plan  Planned Therapy Interventions (OT): activity tolerance training, adaptive equipment training, BADL retraining, functional balance retraining, occupation/activity based interventions, transfer/mobility retraining, patient/caregiver education/training  Therapy Frequency (OT): daily  Plan of Care Review  Outcome Summary: OT eval complete.  Pt presents with weakness and decreased activity tolerance limiting independence with self care.   Pt required min A supine to sit, CGA STS,  CGA to ambulate 30 ft with RW.  Pt dependent to don socks;  pt reports wife usually assists and declines AE for LBD.  Pt's expected UB self care performance setup/ min A,  LB self care max A.   OT will follow to advance pt toward increased independence with ADLs.  Recommend home with assist and HHOT.     Time Calculation:   Time Calculation- OT     Row Name 11/09/20 0755             Time Calculation- OT    OT Start Time  0755  -      OT Received On  11/09/20  -      OT Goal Re-Cert Due Date   11/19/20  -        User Key  (r) = Recorded By, (t) = Taken By, (c) = Cosigned By    Initials Name Provider Type     Ibeth Quigley, OT Occupational Therapist        Therapy Charges for Today     Code Description Service Date Service Provider Modifiers Qty    [id number]  OT EVAL LOW COMPLEXITY 4 11/9/2020 Ibeth Quigley, OT GO 1               Ibeth Quigley OT  11/9/2020

## 2020-11-09 NOTE — PLAN OF CARE
Problem: Adult Inpatient Plan of Care  Goal: Plan of Care Review  Recent Flowsheet Documentation  Taken 11/9/2020 0755 by Ibeth Quigley, OT  Outcome Summary:   OT eval complete.  Pt presents with weakness and decreased activity tolerance limiting independence with self care.   Pt required min A supine to sit, CGA STS,  CGA to ambulate 30 ft with RW.  Pt dependent to don socks   pt reports wife usually assists and declines AE for LBD.  Pt's expected UB self care performance setup/ min A,  LB self care max A.   OT will follow to advance pt toward increased independence with ADLs.  Recommend home with assist and HHOT.

## 2020-11-09 NOTE — PROGRESS NOTES
Discharge Planning Assessment  [facility]     Patient Name: Sonido Villaseñor  MRN: [id number]  Today's Date: 11/9/2020    Admit Date: 11/7/2020    Discharge Needs Assessment     Row Name 11/09/20 1123       Living Environment    Lives With  spouse    Current Living Arrangements  home/apartment/condo    Living Arrangement Comments  States his spouse assists with care needs as needed.       Discharge Needs Assessment    Equipment Currently Used at Home  bipap;oxygen;walker, rolling    Equipment Needed After Discharge  none    Discharge Coordination/Progress  The pt is current with [facility]. I could not get service info or Dx as nursing is out. He has TF thru [facility] home infusion. He does 2 cans per day but eats some.He has a trilogy and continuous 02 thru [facility]. Portable concentrator.        Discharge Plan     Row Name 11/09/20 1131       Plan    Plan  Home with     Patient/Family in Agreement with Plan  yes    Plan Comments  I spoke with the pt in the room and the pts spouse by phone. They have no new DC needs at this time. Will need to resume HH at DC.    Final Discharge Disposition Code  06 - home with home health care    Row Name 11/09/20 0846       Plan    Final Discharge Disposition Code  01 - home or self-care        Continued Care and Services - Admitted Since 11/7/2020    Coordination has not been started for this encounter.     Selected Continued Care - Prior Encounters Includes selections from prior encounters from 8/9/2020 to 11/9/2020    Discharged on 11/6/2020 Admission date: 11/5/2020 - Discharge disposition: Home or Self Care    Home Medical Care     Service Provider Selected Services Address Phone Fax    [facility]  Home Health Services [address] [place] [id number] [phone]                    Expected Discharge Date and Time     Expected Discharge Date Expected Discharge Time    Nov 11, 2020          Demographic Summary    No documentation.       Functional Status     Row Name 11/09/20 1123       Functional Status    Usual Activity Tolerance  fair       Functional Status, IADL    Medications  assistive person    Meal Preparation  assistive person    Housekeeping  assistive person    Laundry  assistive person    Shopping  assistive person        Psychosocial    No documentation.       Abuse/Neglect    No documentation.       Legal    No documentation.       Substance Abuse    No documentation.       Patient Forms    No documentation.           Jennifer Mcmahan RN

## 2020-11-09 NOTE — PROGRESS NOTES
Nutrition Services    Patient Name:  Sonido Villaseñor  YOB: 1960  MRN: [id number]  Admit Date:  11/7/2020    RD spoke with hospitalist about patient's tube feeding. Will begin cautiously with continuous regimen at half goal rate (Isosource 1.5 @ 30 ml/hr and free water @ 25 ml/hr) and re-assess tomorrow for readiness to advance to goal rate or begin home bolus regimen of 2 cartons daily.    Isosource 1.5 @ 30 ml/hr and free water @ 25 ml/hr provides 600 ml formula, 990 calories (55% est needs), 45 g protein (39% est needs), 10 g fiber, 513 ml free water (from formula), and 1063 ml total free water (from formula + flushes).    Electronically signed by:  Neelam Shepherd RD  11/09/20 13:34 EST

## 2020-11-09 NOTE — PLAN OF CARE
Goal Outcome Evaluation:  Plan of Care Reviewed With: patient, spouse      Pt VSS, with continued low  grade fever 99. Pt c/o pain through out shift. Pain medication given scheduled, and when he called out for additional meds. Pt with very flat affect, and very straight forward stating that he was not getting his medications. This RN explained to him that all of his medications were being given to him. And  nothing was being withheld. He also asked for something to drink and eat, and this RN reminded him he was not allowed to eat and drink at this time, and that his medications were to go through the PEG tube per Dr. Fajardo. Pt tolerated receiving his medications through his PEG tube with each medication pass. There was no residuals. The PEG site looked ok, with no drainage or redness. Pt using tonsil tip suction for his phlegm. He did sleep for quite a while during the shift. Pt had a few BM's which were look, dark brown/green and loose. Will continue to monitor pt at this time.

## 2020-11-09 NOTE — PROGRESS NOTES
"Patient Name:  Sonido Villaseñor  YOB: 1960  [id number]    Surgery Progress Note    Date of visit: 11/9/2020    Subjective   Subjective: Pain controlled.  The patient was seen by the dysphagia team, who diagnosed him with aspiration of all consistencies.  However, both the patient and his wife were very noncompliant and insisted on still feeding him occasionally at home.  He continues to demand water.  He had multiple bowel movements yesterday.         Objective     Objective:     /79 (BP Location: Left arm, Patient Position: Lying)   Pulse 72   Temp 98.8 °F (37.1 °C) (Oral)   Resp 16   Ht 157.5 cm (62\")   Wt 105 kg (230 lb 6.4 oz)   SpO2 95%   BMI 42.14 kg/m²     Intake/Output Summary (Last 24 hours) at 11/9/2020 0703  Last data filed at 11/8/2020 2240  Gross per 24 hour   Intake 450 ml   Output 1200 ml   Net -750 ml       CV:  Rhythm  regular and rate regular   L:  Rhonchi to auscultation bilaterally   Abd:  Bowel sounds positive , soft, appropriately tender. Dressings and PEG c/d/i  Ext:  No cyanosis, clubbing, edema    Recent labs that are back at this time have been reviewed.  Hemoglobin is stable.  White count 5.5, platelet count 72.       Assessment/Plan     Assessment/ Plan:    Hospital Problem List     * (Principal) Post-op pneumonia- Stable.  His platelets are currently dropping, so we will hold his heparin.  He and his wife are fairly noncompliant with dysphagia precautions it appears, though I would recommend continuing all medications and feeds via his PEG tube.  Continue to work with physical therapy.  From a surgical standpoint he can go home whenever he is medically ready, with home health for home feedings..      Dysphagia    Hypertensive disorder        Obstructive sleep apnea syndrome    History of throat cancer    Chronic pain              Kevin Fajardo MD  11/9/2020  07:03 EST      "

## 2020-11-10 ENCOUNTER — APPOINTMENT (OUTPATIENT)
Dept: GENERAL RADIOLOGY | Facility: HOSPITAL | Age: 60
End: 2020-11-10

## 2020-11-10 LAB
ANION GAP SERPL CALCULATED.3IONS-SCNC: 12 MMOL/L (ref 5–15)
BUN SERPL-MCNC: 8 MG/DL (ref 8–23)
BUN/CREAT SERPL: 11.6 (ref 7–25)
CALCIUM SPEC-SCNC: 8.7 MG/DL (ref 8.6–10.5)
CHLORIDE SERPL-SCNC: 104 MMOL/L (ref 98–107)
CO2 SERPL-SCNC: 22 MMOL/L (ref 22–29)
CREAT SERPL-MCNC: 0.69 MG/DL (ref 0.76–1.27)
DEPRECATED RDW RBC AUTO: 44.7 FL (ref 37–54)
ERYTHROCYTE [DISTWIDTH] IN BLOOD BY AUTOMATED COUNT: 12.9 % (ref 12.3–15.4)
GFR SERPL CREATININE-BSD FRML MDRD: 116 ML/MIN/1.73
GFR SERPL CREATININE-BSD FRML MDRD: 141 ML/MIN/1.73
GLUCOSE BLDC GLUCOMTR-MCNC: 108 MG/DL (ref 70–130)
GLUCOSE BLDC GLUCOMTR-MCNC: 108 MG/DL (ref 70–130)
GLUCOSE BLDC GLUCOMTR-MCNC: 117 MG/DL (ref 70–130)
GLUCOSE BLDC GLUCOMTR-MCNC: 127 MG/DL (ref 70–130)
GLUCOSE SERPL-MCNC: 118 MG/DL (ref 65–99)
HCT VFR BLD AUTO: 32.9 % (ref 37.5–51)
HGB BLD-MCNC: 10.5 G/DL (ref 13–17.7)
MCH RBC QN AUTO: 30.4 PG (ref 26.6–33)
MCHC RBC AUTO-ENTMCNC: 31.9 G/DL (ref 31.5–35.7)
MCV RBC AUTO: 95.4 FL (ref 79–97)
NT-PROBNP SERPL-MCNC: 893.3 PG/ML (ref 0–900)
PHOSPHATE SERPL-MCNC: 1.7 MG/DL (ref 2.5–4.5)
PHOSPHATE SERPL-MCNC: 2 MG/DL (ref 2.5–4.5)
PLATELET # BLD AUTO: 113 10*3/MM3 (ref 140–450)
PMV BLD AUTO: 11 FL (ref 6–12)
POTASSIUM SERPL-SCNC: 3.1 MMOL/L (ref 3.5–5.2)
PROCALCITONIN SERPL-MCNC: 0.23 NG/ML (ref 0–0.25)
RBC # BLD AUTO: 3.45 10*6/MM3 (ref 4.14–5.8)
SODIUM SERPL-SCNC: 138 MMOL/L (ref 136–145)
VANCOMYCIN TROUGH SERPL-MCNC: 9.2 MCG/ML (ref 5–20)
WBC # BLD AUTO: 5.24 10*3/MM3 (ref 3.4–10.8)

## 2020-11-10 PROCEDURE — 99225 PR SBSQ OBSERVATION CARE/DAY 25 MINUTES: CPT | Performed by: INTERNAL MEDICINE

## 2020-11-10 PROCEDURE — 71045 X-RAY EXAM CHEST 1 VIEW: CPT

## 2020-11-10 PROCEDURE — 82962 GLUCOSE BLOOD TEST: CPT

## 2020-11-10 PROCEDURE — 94660 CPAP INITIATION&MGMT: CPT

## 2020-11-10 PROCEDURE — 94799 UNLISTED PULMONARY SVC/PX: CPT

## 2020-11-10 PROCEDURE — 80048 BASIC METABOLIC PNL TOTAL CA: CPT | Performed by: INTERNAL MEDICINE

## 2020-11-10 PROCEDURE — 25010000002 PIPERACILLIN SOD-TAZOBACTAM PER 1 G: Performed by: NURSE PRACTITIONER

## 2020-11-10 PROCEDURE — 84100 ASSAY OF PHOSPHORUS: CPT | Performed by: INTERNAL MEDICINE

## 2020-11-10 PROCEDURE — 84145 PROCALCITONIN (PCT): CPT | Performed by: INTERNAL MEDICINE

## 2020-11-10 PROCEDURE — 25010000002 VANCOMYCIN 10 G RECONSTITUTED SOLUTION: Performed by: INTERNAL MEDICINE

## 2020-11-10 PROCEDURE — 85027 COMPLETE CBC AUTOMATED: CPT | Performed by: INTERNAL MEDICINE

## 2020-11-10 PROCEDURE — G0378 HOSPITAL OBSERVATION PER HR: HCPCS

## 2020-11-10 PROCEDURE — 36600 WITHDRAWAL OF ARTERIAL BLOOD: CPT

## 2020-11-10 PROCEDURE — 83880 ASSAY OF NATRIURETIC PEPTIDE: CPT | Performed by: INTERNAL MEDICINE

## 2020-11-10 PROCEDURE — 80202 ASSAY OF VANCOMYCIN: CPT | Performed by: NURSE PRACTITIONER

## 2020-11-10 PROCEDURE — 82805 BLOOD GASES W/O2 SATURATION: CPT

## 2020-11-10 RX ORDER — CALCIUM CARBONATE 750 MG/1
750 TABLET, CHEWABLE ORAL 3 TIMES DAILY PRN
Status: DISCONTINUED | OUTPATIENT
Start: 2020-11-10 | End: 2020-11-11 | Stop reason: HOSPADM

## 2020-11-10 RX ADMIN — GABAPENTIN 600 MG: 300 CAPSULE ORAL at 21:46

## 2020-11-10 RX ADMIN — CETIRIZINE HCL, PSEUDOEPHEDRINE HCL 1 TABLET: 5; 120 TABLET, EXTENDED RELEASE ORAL at 09:47

## 2020-11-10 RX ADMIN — IPRATROPIUM BROMIDE AND ALBUTEROL SULFATE 3 ML: 2.5; .5 SOLUTION RESPIRATORY (INHALATION) at 01:06

## 2020-11-10 RX ADMIN — Medication 2000 UNITS: at 09:47

## 2020-11-10 RX ADMIN — ROSUVASTATIN CALCIUM 10 MG: 10 TABLET, COATED ORAL at 21:47

## 2020-11-10 RX ADMIN — METOCLOPRAMIDE 10 MG: 10 TABLET ORAL at 21:46

## 2020-11-10 RX ADMIN — LORAZEPAM 1 MG: 1 TABLET ORAL at 09:47

## 2020-11-10 RX ADMIN — SODIUM CHLORIDE, PRESERVATIVE FREE 10 ML: 5 INJECTION INTRAVENOUS at 21:47

## 2020-11-10 RX ADMIN — ESCITALOPRAM OXALATE 20 MG: 20 TABLET ORAL at 09:47

## 2020-11-10 RX ADMIN — TOPIRAMATE 50 MG: 25 TABLET, FILM COATED ORAL at 21:47

## 2020-11-10 RX ADMIN — GABAPENTIN 600 MG: 300 CAPSULE ORAL at 16:45

## 2020-11-10 RX ADMIN — CALCIUM CARBONATE 750 MG: 750 TABLET ORAL at 17:27

## 2020-11-10 RX ADMIN — TOPIRAMATE 50 MG: 25 TABLET, FILM COATED ORAL at 09:47

## 2020-11-10 RX ADMIN — METOCLOPRAMIDE 10 MG: 10 TABLET ORAL at 06:09

## 2020-11-10 RX ADMIN — MELATONIN TAB 5 MG 10 MG: 5 TAB at 22:19

## 2020-11-10 RX ADMIN — TAZOBACTAM SODIUM AND PIPERACILLIN SODIUM 3.38 G: 375; 3 INJECTION, SOLUTION INTRAVENOUS at 17:26

## 2020-11-10 RX ADMIN — METOCLOPRAMIDE 10 MG: 10 TABLET ORAL at 16:45

## 2020-11-10 RX ADMIN — NITROGLYCERIN 1 PATCH: 0.4 PATCH TRANSDERMAL at 09:47

## 2020-11-10 RX ADMIN — LORAZEPAM 1 MG: 1 TABLET ORAL at 16:45

## 2020-11-10 RX ADMIN — POTASSIUM & SODIUM PHOSPHATES POWDER PACK 280-160-250 MG 2 PACKET: 280-160-250 PACK at 09:47

## 2020-11-10 RX ADMIN — MINERAL SUPPLEMENT IRON 300 MG / 5 ML STRENGTH LIQUID 100 PER BOX UNFLAVORED 300 MG: at 09:46

## 2020-11-10 RX ADMIN — DOCUSATE SODIUM 100 MG: 50 LIQUID ORAL at 21:46

## 2020-11-10 RX ADMIN — POTASSIUM CHLORIDE 40 MEQ: 1.5 FOR SOLUTION ORAL at 11:44

## 2020-11-10 RX ADMIN — IPRATROPIUM BROMIDE AND ALBUTEROL SULFATE 3 ML: 2.5; .5 SOLUTION RESPIRATORY (INHALATION) at 19:34

## 2020-11-10 RX ADMIN — ESCITALOPRAM OXALATE 20 MG: 20 TABLET ORAL at 21:47

## 2020-11-10 RX ADMIN — OXYCODONE 10 MG: 5 TABLET ORAL at 06:09

## 2020-11-10 RX ADMIN — CETIRIZINE HCL, PSEUDOEPHEDRINE HCL 1 TABLET: 5; 120 TABLET, EXTENDED RELEASE ORAL at 21:46

## 2020-11-10 RX ADMIN — POTASSIUM CHLORIDE 40 MEQ: 1.5 FOR SOLUTION ORAL at 16:45

## 2020-11-10 RX ADMIN — OXYCODONE HYDROCHLORIDE AND ACETAMINOPHEN 1 TABLET: 10; 325 TABLET ORAL at 05:31

## 2020-11-10 RX ADMIN — METOPROLOL TARTRATE 25 MG: 25 TABLET, FILM COATED ORAL at 09:47

## 2020-11-10 RX ADMIN — IPRATROPIUM BROMIDE AND ALBUTEROL SULFATE 3 ML: 2.5; .5 SOLUTION RESPIRATORY (INHALATION) at 09:30

## 2020-11-10 RX ADMIN — OXYCODONE HYDROCHLORIDE AND ACETAMINOPHEN 1 TABLET: 10; 325 TABLET ORAL at 01:17

## 2020-11-10 RX ADMIN — IPRATROPIUM BROMIDE AND ALBUTEROL SULFATE 3 ML: 2.5; .5 SOLUTION RESPIRATORY (INHALATION) at 03:13

## 2020-11-10 RX ADMIN — OXYCODONE 10 MG: 5 TABLET ORAL at 11:44

## 2020-11-10 RX ADMIN — GABAPENTIN 600 MG: 300 CAPSULE ORAL at 09:47

## 2020-11-10 RX ADMIN — GUAIFENESIN 200 MG: 200 SOLUTION ORAL at 09:46

## 2020-11-10 RX ADMIN — TRAZODONE HYDROCHLORIDE 50 MG: 50 TABLET ORAL at 09:47

## 2020-11-10 RX ADMIN — ESOMEPRAZOLE MAGNESIUM 40 MG: 40 CAPSULE, DELAYED RELEASE ORAL at 21:46

## 2020-11-10 RX ADMIN — GUAIFENESIN 200 MG: 200 SOLUTION ORAL at 21:46

## 2020-11-10 RX ADMIN — RISPERIDONE 1 MG: 1 TABLET ORAL at 21:46

## 2020-11-10 RX ADMIN — OXYCODONE HYDROCHLORIDE AND ACETAMINOPHEN 1 TABLET: 10; 325 TABLET ORAL at 22:19

## 2020-11-10 RX ADMIN — VANCOMYCIN HYDROCHLORIDE 1250 MG: 10 INJECTION, POWDER, LYOPHILIZED, FOR SOLUTION INTRAVENOUS at 09:57

## 2020-11-10 RX ADMIN — RISPERIDONE 1 MG: 1 TABLET ORAL at 09:47

## 2020-11-10 RX ADMIN — TAZOBACTAM SODIUM AND PIPERACILLIN SODIUM 3.38 G: 375; 3 INJECTION, SOLUTION INTRAVENOUS at 09:55

## 2020-11-10 RX ADMIN — IPRATROPIUM BROMIDE AND ALBUTEROL SULFATE 3 ML: 2.5; .5 SOLUTION RESPIRATORY (INHALATION) at 13:27

## 2020-11-10 RX ADMIN — METOCLOPRAMIDE 10 MG: 10 TABLET ORAL at 11:44

## 2020-11-10 RX ADMIN — IPRATROPIUM BROMIDE AND ALBUTEROL SULFATE 3 ML: 2.5; .5 SOLUTION RESPIRATORY (INHALATION) at 23:46

## 2020-11-10 RX ADMIN — ESOMEPRAZOLE MAGNESIUM 40 MG: 40 CAPSULE, DELAYED RELEASE ORAL at 09:49

## 2020-11-10 RX ADMIN — LORAZEPAM 1 MG: 1 TABLET ORAL at 22:19

## 2020-11-10 RX ADMIN — OXYCODONE 10 MG: 5 TABLET ORAL at 16:45

## 2020-11-10 RX ADMIN — ASPIRIN 81 MG CHEWABLE TABLET 81 MG: 81 TABLET CHEWABLE at 09:47

## 2020-11-10 RX ADMIN — IPRATROPIUM BROMIDE AND ALBUTEROL SULFATE 3 ML: 2.5; .5 SOLUTION RESPIRATORY (INHALATION) at 15:50

## 2020-11-10 RX ADMIN — LEVOTHYROXINE SODIUM 88 MCG: 88 TABLET ORAL at 06:09

## 2020-11-10 RX ADMIN — METOPROLOL TARTRATE 25 MG: 25 TABLET, FILM COATED ORAL at 21:46

## 2020-11-10 RX ADMIN — TRAZODONE HYDROCHLORIDE 50 MG: 50 TABLET ORAL at 21:46

## 2020-11-10 RX ADMIN — POTASSIUM CHLORIDE 40 MEQ: 1.5 FOR SOLUTION ORAL at 21:46

## 2020-11-10 NOTE — PROGRESS NOTES
Continued Stay Note  [facility]     Patient Name: Sonido Villaseñor  MRN: [id number]  Today's Date: 11/10/2020    Admit Date: 11/7/2020    Discharge Plan     Row Name 11/10/20 1551       Plan    Plan  Home/HH    Patient/Family in Agreement with Plan  yes    Plan Comments  Possible d/c today with HH. I called [facility] @[phone] and she needs new HH orders.  needs to call HH in am to let them know of d/c and fax order, discharge summary and H/P to [phone]. Orders for HH for SKN, PT/OT. Pamela @ [phone]    Final Discharge Disposition Code  06 - home with home health care        Discharge Codes    No documentation.       Expected Discharge Date and Time     Expected Discharge Date Expected Discharge Time    Nov 11, 2020             Cayla Juarez, RN

## 2020-11-10 NOTE — PROGRESS NOTES
[facility] Medicine Services  PROGRESS NOTE    Patient Name: Sonido Villaseñor  : 1960  MRN: [id number]    Date of Admission: 2020  Primary Care Physician: Jorge Fernandez MD    Subjective   Subjective     CC:  dysphagia    HPI:  Still coughing, productive. Tired today    Review of Systems  Gen- No fevers, chills  CV- No chest pain, palpitations  Resp- + cough  GI- No N/V/D, abd pain        Objective   Objective     Vital Signs:   Temp:  [98.8 °F (37.1 °C)-99.8 °F (37.7 °C)] 99.5 °F (37.5 °C)  Heart Rate:  [69-87] 80  Resp:  [16-18] 16  BP: (130-194)/(72-97) 133/78        Physical Exam:  Constitutional - somnolent but arousable, spouse at bedside  HEENT-NCAT, mucous membranes moist  CV-RRR, S1 S2 normal, no m/r/g  Resp-no wheezes, rhonchi or rales  Abd-soft, nontender, nondistended, normoactive bowel sounds, PEG  Ext-+ LE edema  Neuro- will briefly answer questions.  Psych-flat affect   Skin- No rash on exposed UE or LE bilaterally      Results Reviewed:  Results from last 7 days   Lab Units 20  1313 20  1803   WBC 10*3/mm3 4.53 5.28 5.52 7.51   HEMOGLOBIN g/dL 9.6* 9.7* 10.0* 10.2*   HEMATOCRIT % 30.3* 31.0* 31.6* 32.7*   PLATELETS 10*3/mm3 102* 89* 72* 84*   PROCALCITONIN ng/mL  --   --   --  0.63*     Results from last 7 days   Lab Units 20  0519 20  2242 20  1803 20  0551   SODIUM mmol/L 138  --  139 136 139   POTASSIUM mmol/L 3.4* 3.7 3.1* 3.5 3.6   CHLORIDE mmol/L 105  --  104 100 104   CO2 mmol/L 21.0*  --  26.0 25.0 27.0   BUN mg/dL 7*  --  9 12 11   CREATININE mg/dL 0.71*  --  0.74* 0.84 0.79   GLUCOSE mg/dL 94  --  108* 103* 137*   CALCIUM mg/dL 8.1*  --  8.3* 8.7 8.6   ALT (SGPT) U/L  --   --   --  18 23   AST (SGOT) U/L  --   --   --  21 28     Estimated Creatinine Clearance: 114.1 mL/min (A) (by C-G formula based on SCr of 0.71 mg/dL (L)).    Microbiology Results Abnormal     Procedure  Component Value - Date/Time    Blood Culture - Blood, Hand, Right [[id number]] Collected: 11/07/20 2005    Lab Status: Preliminary result Specimen: Blood from Hand, Right Updated: 11/09/20 2045     Blood Culture No growth at 2 days    Blood Culture - Blood, Wrist, Left [[id number]] Collected: 11/07/20 1956    Lab Status: Preliminary result Specimen: Blood from Wrist, Left Updated: 11/09/20 2045     Blood Culture No growth at 2 days    MRSA Screen, PCR (Inpatient) - Swab, Nares [[id number]]  (Normal) Collected: 11/09/20 1528    Lab Status: Final result Specimen: Swab from Nares Updated: 11/09/20 1732     MRSA PCR Negative    Narrative:      MRSA Negative    S. Pneumo Ag Urine or CSF - Urine, Urine, Clean Catch [[id number]]  (Normal) Collected: 11/08/20 0555    Lab Status: Final result Specimen: Urine, Clean Catch Updated: 11/08/20 1254     Strep Pneumo Ag Negative    Legionella Antigen, Urine - Urine, Urine, Clean Catch [[id number]]  (Normal) Collected: 11/08/20 0555    Lab Status: Final result Specimen: Urine, Clean Catch Updated: 11/08/20 1254     LEGIONELLA ANTIGEN, URINE Negative    COVID PRE-OP / PRE-PROCEDURE SCREENING ORDER (NO ISOLATION) - Swab, Nasopharynx [[id number]]  (Normal) Collected: 11/08/20 0131    Lab Status: Final result Specimen: Swab from Nasopharynx Updated: 11/08/20 0304    Narrative:      The following orders were created for panel order COVID PRE-OP / PRE-PROCEDURE SCREENING ORDER (NO ISOLATION) - Swab, Nasopharynx.  Procedure                               Abnormality         Status                     ---------                               -----------         ------                     Respiratory Panel PCR w/...[[id number]]  Normal              Final result                 Please view results for these tests on the individual orders.    Respiratory Panel PCR w/COVID-19(SARS-CoV-2) HARMONY/CAN/HARRIET/PAD/COR/MAD/VAZQUEZ In-House, NP Swab in UTM/VTM, 3-4 HR TAT - Swab, Nasopharynx [[id number]]  (Normal)  Collected: 11/08/20 0131    Lab Status: Final result Specimen: Swab from Nasopharynx Updated: 11/08/20 0304     ADENOVIRUS, PCR Not Detected     Coronavirus 229E Not Detected     Coronavirus HKU1 Not Detected     Coronavirus NL63 Not Detected     Coronavirus OC43 Not Detected     COVID19 Not Detected     Human Metapneumovirus Not Detected     Human Rhinovirus/Enterovirus Not Detected     Influenza A PCR Not Detected     Influenza A H1 Not Detected     Influenza A H1 2009 PCR Not Detected     Influenza A H3 Not Detected     Influenza B PCR Not Detected     Parainfluenza Virus 1 Not Detected     Parainfluenza Virus 2 Not Detected     Parainfluenza Virus 3 Not Detected     Parainfluenza Virus 4 Not Detected     RSV, PCR Not Detected     Bordetella pertussis pcr Not Detected     Bordetella parapertussis PCR Not Detected     Chlamydophila pneumoniae PCR Not Detected     Mycoplasma pneumo by PCR Not Detected    Narrative:      Fact sheet for providers: https://[employer]/wp-content/uploads/TWJ5674-4980-DC3.1-EUA-Provider-Fact-Sheet-3.pdf    Fact sheet for patients: https://docs.[employer]/wp-content/uploads/AUJ8531-8489-BP6.1-EUA-Patient-Fact-Sheet-1.pdf          Imaging Results (Last 24 Hours)     ** No results found for the last 24 hours. **               I have reviewed the medications:  Scheduled Meds:vancomycin, 11.7 mg/kg, Intravenous, Q18H    And  [START ON 11/10/2020] Pharmacy Consult, , Does not apply, Once  aspirin, 81 mg, Oral, Daily  bisacodyl, 10 mg, Rectal, Daily  cetirizine-pseudoephedrine, 1 tablet, Oral, BID  cholecalciferol, 2,000 Units, Oral, Daily  docusate sodium, 100 mg, Oral, BID  escitalopram, 20 mg, Oral, BID  esomeprazole, 40 mg, Oral, Q12H  ezetimibe, 10 mg, Oral, Nightly  ferrous sulfate, 300 mg, Oral, Daily With Breakfast  gabapentin, 600 mg, Oral, TID  guaiFENesin, 200 mg, Oral, BID  ipratropium-albuterol, 3 mL, Nebulization, Q4H - RT  levothyroxine, 88 mcg, Oral,  Daily  methylnaltrexone, 4 mg, Subcutaneous, Every Other Day  metoclopramide, 10 mg, Oral, 4x Daily AC & at Bedtime  metoprolol tartrate, 25 mg, Oral, Q12H  nitroglycerin, 1 patch, Transdermal, Daily  oxyCODONE, 10 mg, Oral, Q6H  piperacillin-tazobactam, 3.375 g, Intravenous, Q8H  risperiDONE, 1 mg, Oral, Q12H  rosuvastatin, 10 mg, Oral, Nightly  sodium chloride, 10 mL, Intravenous, Q12H  topiramate, 50 mg, Oral, Q12H  traZODone, 50 mg, Oral, BID      Continuous Infusions:Pharmacy to dose vancomycin,       PRN Meds:.acetaminophen **OR** acetaminophen **OR** acetaminophen  •  albuterol  •  LORazepam  •  magnesium hydroxide  •  magnesium sulfate **OR** magnesium sulfate **OR** magnesium sulfate  •  melatonin  •  ondansetron **OR** ondansetron  •  oxyCODONE-acetaminophen  •  Pharmacy to dose vancomycin  •  potassium & sodium phosphates **OR** potassium & sodium phosphates  •  potassium chloride **OR** potassium chloride **OR** potassium chloride  •  simethicone  •  [COMPLETED] Insert peripheral IV **AND** sodium chloride  •  sodium chloride  •  tiZANidine    Assessment/Plan   Assessment & Plan     Active Hospital Problems    Diagnosis  POA   • **Post-op pneumonia [J95.89, J18.9]  Yes   • History of throat cancer [Z85.819]  Yes   • Chronic pain [G89.29]  Unknown   • Dysphagia [R13.10]  Yes   • Obstructive sleep apnea syndrome [G47.33]  Yes   • Hypertensive disorder [I10]  Yes      Resolved Hospital Problems   No resolved problems to display.        Brief Hospital Course to date:  Sonido Villaseñor is a 60 y.o. male with history of throat cancer and chronic dysphagia, RENO on Trilogy at home and recent cholecystectomy presents with abdominal pain and aspiration pneumonia    Aspiration pneumonia  - continue zosyn  - CXR and cbc am    Dysphagia  - will all consistencies per Speech  - continue tube feeds -- per spouse, patient eats a regular diet at home, and would like to return to this manner of eating, but agreeable to tube  feeds while treating aspiration pneumonia.    RENO  - bipap hs and prn    Chronic constipation    Chronic pain    Generalized weakness  - PT/OT    Thrombocytopenia  - suspect chronic, however heparin held    DVT Prophylaxis:  mechanical    Disposition: I expect the patient to be discharged home TBD    CODE STATUS:   Code Status and Medical Interventions:   Ordered at: 11/08/20 0018     Code Status:    CPR     Medical Interventions (Level of Support Prior to Arrest):    Full       Lewis Patel MD  11/09/20

## 2020-11-10 NOTE — PROGRESS NOTES
"Patient Name:  Sonido Villaseñor  YOB: 1960  [id number]    Surgery Progress Note    Date of visit: 11/10/2020    Subjective   Subjective: Feeling better. Tolerating TF, had a BM. Pain better controlled.  His wife reports that he still insists on eating by mouth, even though he is a high aspiration risk.         Objective     Objective:     /78 (BP Location: Left arm, Patient Position: Lying)   Pulse 73   Temp 99.7 °F (37.6 °C) (Oral)   Resp 21   Ht 157.5 cm (62\")   Wt 102 kg (224 lb 1.6 oz)   SpO2 90%   BMI 40.99 kg/m²     Intake/Output Summary (Last 24 hours) at 11/10/2020 0715  Last data filed at 11/9/2020 1800  Gross per 24 hour   Intake 600 ml   Output --   Net 600 ml       CV:  Rhythm  regular and rate regular   L:  Clear to auscultation bilaterally   Abd:  Bowel sounds positive , soft, minimally tender. Incisions c/d/i  Ext:  No cyanosis, clubbing, edema    Recent labs that are back at this time have been reviewed.        Assessment/Plan     Assessment/ Plan:    Hospital Problem List     * (Principal) Post-op pneumonia - Fairly mild, and no doubt worsened by the patient's insistence on eating by mouth even though he is a high aspiration risk.  From a surgical standpoint he can be discharged home once the medicine service feels that he is stable for discharge.  PT and OT of already recommended home health for home PT and OT services.  I have again explained to the patient that I think it is foolhardy for him to continue to eat by mouth, when he has a PEG placed, and every time he eats he tends to aspirate.  He states he understands.      Dysphagia    Hypertensive disorder        Obstructive sleep apnea syndrome    History of throat cancer    Chronic pain              Kevin Fajardo MD  11/10/2020  07:15 EST      "

## 2020-11-10 NOTE — PROGRESS NOTES
"                  Clinical Nutrition     Nutrition Support Assessment  Reason for Visit:   Follow-up protocol, EN    Patient Name: Sonido Villaseñor  YOB: 1960  MRN: [id number]  Date of Encounter: 11/10/20 11:39 EST  Admission date: 11/7/2020       -Continue current EN regimen at this time: Isosource 1.5 @ 30 ml/hr. Free water @ 25 ml/hr.    -Current regimen not meeting calorie and protein needs (final goal rate to meet needs is 60 ml/hr). Patient may advance towards goal rate as MD allows.    Nutrition Assessment   Assessment     Admission diagnosis  Postop pneumonia    Additional applicable diagnosis/conditions/procedures this adm:  Recent lap drea, PEG placement (11/5)  (11/8) SLP eval - NPO; water/ice for comfort    Applicable PMH/PSxH:   HTN  HLP  GERD  Chronic constipation  RENO  COPD  Tobacco (quit ~2018)  Chronic dysphagia 2/2 throat cancer - XRT (10 years ago)  Kyphosis- per [facility] dietitian notes 11/4 - \"numerous surgeries on his throat related to scar tissue which has led to a 7 inch reduction in his height / wife states that his chin now sits on top of his chest\"  Chronic pain    CABG  Back surgery  \"Numerous throat surgeries/resections\"  Has had x2 prior PEG placements. Last PEG was removed 18-24 months ago 2/2 infection      Reported/Observed/Food/Nutrition Related History:   Continuous EN regimen initiated yesterday afternoon (11/9): Fibersource HN @ 30 ml/hr and free water @ 25 ml/hr.    Patient and wife present during visit. Wife reports patient is tolerating current EN regimen without any issues. Reports patient has ongoing indigestion that is requiring Nexium (issue present prior to EN initiation). Wife reports patient desire oral intake, even just ice chips at this time - plans to speak with hospitalist about this today. RD messaged hospitalist to discuss today's plan - awaiting response.    Anthropometrics     Height: 157.5 cm (62\") - per wife d/t hx of surgeries, Normal height " "prior to throat surgeries was 69 inches  Last filed wt: Weight: 102 kg (224 lb 1.6 oz) (11/10/20 0500)  Weight Method: Bed scale    BMI: BMI (Calculated): 41  Obese Class III extreme obesity: > or equal to 40kg/m2    Ideal Body Weight (IBW) (kg): 54.13  Admission wt: 236 lb  Method obtained: weight per charting on admission 11/7 - no method listed    Weight Weight (kg) Weight (lbs) Weight Method   11/2/2020 108.1 kg 238 lb 5.1 oz Standing scale   11/5/2020 108.1 kg 238 lb 5.1 oz    11/7/2020 107.049 kg 236 lb    11/8/2020 106.641 kg 235 lb 1.6 oz Bed scale   11/8/2020 106.641 kg 235 lb 1.6 oz Bed scale     Per RD note (11/8):  Weight Change   UBW: ~235 lbs  Per [facility] dietitian note 11/4 - \"He has maintained his current weight for the past 6-12 months.    Labs reviewed   Labs reviewed: Yes    Medications reviewed   Medications reviewed: Yes  Pertinent: Linzess, antibiotic, reglan, dulcolax, vitamin D, colace, relistor, ferrous sulfate  PRN: ativan    Needs Assessment (11/10)     Height used  157.5 cm (62 in)   Weight used  107 kg (235 lbs)   Obesity adjBW  66.9 kgs  (147 lbs)     Estimated need Method/Equation used Result    Energy/Calorie need  ~1800 kcals/d  16 - 18 g/kg actBW  1712 - 1926 kcal             Protein   ~115 g/day  1.0 - 1.2 g/kg actBW    107 - 128 kg           Fiber     Fluid  2000 - 2100 mL 20 - 22 mL/kg actBW  25 - 30 mL/kg obesity adjustedBW  2140 mL   1672 - 2007 mL            Current Nutrition Prescription   PO: NPO Diet     EN: Isosource 1.5 @ 30 ml/hr. Free water @ 25 ml/hr.  Route: PEG  Verified at bedside: Yes  Nutrition provided at current goal rate: 660 ml formula, 990 calories (55% est needs), 45 g protein (39% est needs), 10 g fiber, 513 ml free water (from formula), and 1063 ml total free water (from formula and flushes).    Intake/Evaluation of Received Nutrient/Fluid Intake:  EN delivery past 24 hours = 440 ml formula, 452 ml free water flushes (r/t EN being initiated yesterday " afternoon)    Nutrition Diagnosis     11/8 (updated 11/10)  Problem Inadequate oral intake   Etiology Clinical status/dysphagia   Signs/Symptoms NPO   Status: ongoing, supplemental EN via PEG    11/8 (updated 11/10)  Problem Swallowing difficulty   Etiology Dysphagia   Signs/Symptoms NPO   Status: ongoing    11/10  Problem Inadequate EN infusion   Etiology Current EN regimen   Signs/Symptoms Current EN regimen providing 55% est calorie needs and 39% est protein needs (EN initiation at low rate yesterday per MD)       Nutrition Intervention    Follow treatment progress, Care plan reviewed, Nutrition support order placed    -Continue current EN regimen at this time: Isosource 1.5 @ 30 ml/hr. Free water @ 25 ml/hr.    -Current regimen not meeting calorie and protein needs (final goal rate to meet needs is 60 ml/hr). Patient may advance towards goal rate as MD allows.    Goal:   General: Nutrition support treatment  EN/PN: Maintain EN (until MD recommends otherwise)    Monitoring/Evaluation:   Per protocol, I&O, Pertinent labs, EN delivery/tolerance, Weight, GI status, Symptoms, POC/GOC, Swallow function    Neelam Shepherd RD  Time Spent: 45 minutes

## 2020-11-10 NOTE — PROGRESS NOTES
[facility] Medicine Services  PROGRESS NOTE    Patient Name: Sonido Villaseñor  : 1960  MRN: [id number]    Date of Admission: 2020  Primary Care Physician: Jorge Fernandez MD    Subjective   Subjective     CC:  dysphagia    HPI:  Continues to cough, productive with yellow sputum    Review of Systems  Gen- No fevers, chills  CV- No chest pain, palpitations  Resp- + cough, dyspnea  GI- No N/V/D, abd pain      Objective   Objective     Vital Signs:   Temp:  [98.3 °F (36.8 °C)-99.7 °F (37.6 °C)] 99 °F (37.2 °C)  Heart Rate:  [70-80] 77  Resp:  [16-21] 18  BP: (145-152)/(78-94) 151/86        Physical Exam:  Constitutional -appears fatigued, in bed  HEENT-NCAT, mucous membranes moist  MS-kyphosis noted  CV-RRR, S1 S2 normal, no m/r/g  Resp-diminished bases bilaterally  Abd-soft, nontender, nondistended, normoactive bowel sounds, obese  Ext-trace edema lower extremity bilaterally  Neuro-alert and answers some questions, speech clear, moves all extremities   Psych-flat affect   Skin- No rash on exposed UE or LE bilaterally      Results Reviewed:  Results from last 7 days   Lab Units 11/10/20  0608 11/10/20  0607 20  1313 20  0519  20  1803   WBC 10*3/mm3 5.24  --  4.53 5.28   < > 7.51   HEMOGLOBIN g/dL 10.5*  --  9.6* 9.7*   < > 10.2*   HEMATOCRIT % 32.9*  --  30.3* 31.0*   < > 32.7*   PLATELETS 10*3/mm3 113*  --  102* 89*   < > 84*   PROCALCITONIN ng/mL  --  0.23  --   --   --  0.63*    < > = values in this interval not displayed.     Results from last 7 days   Lab Units 11/10/20  0607 20  2045 20  0519  20  0827 20  1803 20  0551   SODIUM mmol/L 138  --  138  --  139 136 139   POTASSIUM mmol/L 3.1* 3.6 3.4*   < > 3.1* 3.5 3.6   CHLORIDE mmol/L 104  --  105  --  104 100 104   CO2 mmol/L 22.0  --  21.0*  --  26.0 25.0 27.0   BUN mg/dL 8  --  7*  --  9 12 11   CREATININE mg/dL 0.69*  --  0.71*  --  0.74* 0.84 0.79   GLUCOSE mg/dL 118*  --  94   --  108* 103* 137*   CALCIUM mg/dL 8.7  --  8.1*  --  8.3* 8.7 8.6   ALT (SGPT) U/L  --   --   --   --   --  18 23   AST (SGOT) U/L  --   --   --   --   --  21 28   PROBNP pg/mL 893.3  --   --   --   --   --   --     < > = values in this interval not displayed.     Estimated Creatinine Clearance: 115.6 mL/min (A) (by C-G formula based on SCr of 0.69 mg/dL (L)).    Microbiology Results Abnormal     Procedure Component Value - Date/Time    Blood Culture - Blood, Hand, Right [[id number]] Collected: 11/07/20 2005    Lab Status: Preliminary result Specimen: Blood from Hand, Right Updated: 11/09/20 2045     Blood Culture No growth at 2 days    Blood Culture - Blood, Wrist, Left [[id number]] Collected: 11/07/20 1956    Lab Status: Preliminary result Specimen: Blood from Wrist, Left Updated: 11/09/20 2045     Blood Culture No growth at 2 days    MRSA Screen, PCR (Inpatient) - Swab, Nares [[id number]]  (Normal) Collected: 11/09/20 1528    Lab Status: Final result Specimen: Swab from Nares Updated: 11/09/20 1732     MRSA PCR Negative    Narrative:      MRSA Negative    S. Pneumo Ag Urine or CSF - Urine, Urine, Clean Catch [[id number]]  (Normal) Collected: 11/08/20 0555    Lab Status: Final result Specimen: Urine, Clean Catch Updated: 11/08/20 1254     Strep Pneumo Ag Negative    Legionella Antigen, Urine - Urine, Urine, Clean Catch [[id number]]  (Normal) Collected: 11/08/20 0555    Lab Status: Final result Specimen: Urine, Clean Catch Updated: 11/08/20 1254     LEGIONELLA ANTIGEN, URINE Negative    COVID PRE-OP / PRE-PROCEDURE SCREENING ORDER (NO ISOLATION) - Swab, Nasopharynx [[id number]]  (Normal) Collected: 11/08/20 0131    Lab Status: Final result Specimen: Swab from Nasopharynx Updated: 11/08/20 0304    Narrative:      The following orders were created for panel order COVID PRE-OP / PRE-PROCEDURE SCREENING ORDER (NO ISOLATION) - Swab, Nasopharynx.  Procedure                               Abnormality         Status                      ---------                               -----------         ------                     Respiratory Panel PCR w/...[[id number]]  Normal              Final result                 Please view results for these tests on the individual orders.    Respiratory Panel PCR w/COVID-19(SARS-CoV-2) HARMONY/CAN/HARRIET/PAD/COR/MAD/VAZQUEZ In-House, NP Swab in UTM/VTM, 3-4 HR TAT - Swab, Nasopharynx [[id number]]  (Normal) Collected: 11/08/20 0131    Lab Status: Final result Specimen: Swab from Nasopharynx Updated: 11/08/20 0304     ADENOVIRUS, PCR Not Detected     Coronavirus 229E Not Detected     Coronavirus HKU1 Not Detected     Coronavirus NL63 Not Detected     Coronavirus OC43 Not Detected     COVID19 Not Detected     Human Metapneumovirus Not Detected     Human Rhinovirus/Enterovirus Not Detected     Influenza A PCR Not Detected     Influenza A H1 Not Detected     Influenza A H1 2009 PCR Not Detected     Influenza A H3 Not Detected     Influenza B PCR Not Detected     Parainfluenza Virus 1 Not Detected     Parainfluenza Virus 2 Not Detected     Parainfluenza Virus 3 Not Detected     Parainfluenza Virus 4 Not Detected     RSV, PCR Not Detected     Bordetella pertussis pcr Not Detected     Bordetella parapertussis PCR Not Detected     Chlamydophila pneumoniae PCR Not Detected     Mycoplasma pneumo by PCR Not Detected    Narrative:      Fact sheet for providers: https://docs.[employer]/wp-content/uploads/BRJ1361-6756-WV3.1-EUA-Provider-Fact-Sheet-3.pdf    Fact sheet for patients: https://docs.[employer]/wp-content/uploads/ZIY7130-2783-AB5.1-EUA-Patient-Fact-Sheet-1.pdf          Imaging Results (Last 24 Hours)     Procedure Component Value Units Date/Time    XR Chest 1 View [[id number]] Collected: 11/10/20 0804     Updated: 11/10/20 1308    Narrative:      EXAMINATION: XR CHEST 1 VW- 11/10/2020     INDICATION: pneumonia; J95.89-Other postprocedural complications and  disorders of respiratory system, not elsewhere  classified;  J18.9-Pneumonia, unspecified organism; R50.82-Postprocedural fever;  R10.84-Generalized abdominal pain; R13.10-Dysphagia, unspecified      COMPARISON: Chest x-ray 11/07/2020     FINDINGS: Cardiac size enlarged with increased central pulmonary  vascularity and low lung volumes with hypoventilatory effects similar to  prior. No pneumothorax with potential trace left effusion versus  airspace disease of left lung base.       Impression:      Low lung volumes with hypoventilatory effects demonstrate  opacifications at the left lung base.  Atelectasis versus minimal  airspace disease with potential trace layering effusion overall similar  appearance to prior without new consolidation or large effusion  development.     D:  11/10/2020  E:  11/10/2020     This report was finalized on 11/10/2020 1:05 PM by Dr. Oscar Brooks.                  I have reviewed the medications:  Scheduled Meds:aspirin, 81 mg, Oral, Daily  bisacodyl, 10 mg, Rectal, Daily  cetirizine-pseudoephedrine, 1 tablet, Oral, BID  cholecalciferol, 2,000 Units, Oral, Daily  docusate sodium, 100 mg, Oral, BID  escitalopram, 20 mg, Oral, BID  esomeprazole, 40 mg, Oral, Q12H  ezetimibe, 10 mg, Oral, Nightly  ferrous sulfate, 300 mg, Oral, Daily With Breakfast  gabapentin, 600 mg, Oral, TID  guaiFENesin, 200 mg, Oral, BID  ipratropium-albuterol, 3 mL, Nebulization, Q4H - RT  levothyroxine, 88 mcg, Oral, Daily  methylnaltrexone, 4 mg, Subcutaneous, Every Other Day  metoclopramide, 10 mg, Oral, 4x Daily AC & at Bedtime  metoprolol tartrate, 25 mg, Oral, Q12H  nitroglycerin, 1 patch, Transdermal, Daily  oxyCODONE, 10 mg, Oral, Q6H  piperacillin-tazobactam, 3.375 g, Intravenous, Q8H  risperiDONE, 1 mg, Oral, Q12H  rosuvastatin, 10 mg, Oral, Nightly  sodium chloride, 10 mL, Intravenous, Q12H  topiramate, 50 mg, Oral, Q12H  traZODone, 50 mg, Oral, BID      Continuous Infusions:   PRN Meds:.•  acetaminophen **OR** acetaminophen **OR** acetaminophen  •   albuterol  •  calcium carbonate EX  •  LORazepam  •  magnesium hydroxide  •  magnesium sulfate **OR** magnesium sulfate **OR** magnesium sulfate  •  melatonin  •  ondansetron **OR** ondansetron  •  oxyCODONE-acetaminophen  •  potassium & sodium phosphates **OR** potassium & sodium phosphates  •  potassium chloride **OR** potassium chloride **OR** potassium chloride  •  simethicone  •  [COMPLETED] Insert peripheral IV **AND** sodium chloride  •  sodium chloride  •  tiZANidine    Assessment/Plan   Assessment & Plan     Active Hospital Problems    Diagnosis  POA   • **Post-op pneumonia [J95.89, J18.9]  Yes   • History of throat cancer [Z85.819]  Yes   • Chronic pain [G89.29]  Unknown   • Dysphagia [R13.10]  Yes   • Obstructive sleep apnea syndrome [G47.33]  Yes   • Hypertensive disorder [I10]  Yes      Resolved Hospital Problems   No resolved problems to display.        Brief Hospital Course to date:  Sonido Villaseñor is a 60 y.o. male with history of throat cancer and chronic dysphagia, RENO on Trilogy at home and recent cholecystectomy presents with abdominal pain and aspiration pneumonia    Aspiration pneumonia  - continue zosyn  -No leukocytosis, WBC count 5.24 and procalcitonin improved to 0.23    Dysphagia  - will all consistencies per Speech  - continue tube feeds -- per spouse, patient eats a regular diet at home, and would like to return to this manner of eating, but agreeable to tube feeds while treating aspiration pneumonia.  -Chest x-ray from this morning personally reviewed showing low lung volumes and possible continued opacification of the left lung base to my eye.    RENO  - bipap hs and prn    Chronic constipation    Chronic pain    Generalized weakness  - PT/OT    Thrombocytopenia  - suspect chronic, however heparin held  -Improved, platelets 113 today    DVT Prophylaxis:  mechanical    Disposition: I expect the patient to be discharged home home in a.m. with oral antibiotics and tube feeds.  The  registered dietitian will review the home tube feed regimen with the patient and his spouse prior to discharge.  I again reiterated importance of compliance with tube feeds to avoid recurrent aspiration pneumonia.  I also called the patient's PCP Dr. Fernandez to update him on Mr. Villaseñor's clinical course.  Dr. Fernandez is very familiar with the patient and is glad to see him in follow-up.  Also discussed treatment plan today with general surgery Dr. Fajardo.    CODE STATUS:   Code Status and Medical Interventions:   Ordered at: 11/08/20 0018     Code Status:    CPR     Medical Interventions (Level of Support Prior to Arrest):    Full       Lewis Patel MD  11/10/20

## 2020-11-10 NOTE — PROGRESS NOTES
"Pharmacy Consult-Vancomycin Dosing  Sonido Villaseñor is a  60 y.o. male receiving vancomycin therapy.     Indication: pneumonia and fever  Consulting Provider: hospitalist  ID Consult: no    Goal AUC: 400 - 600 mg/L*hr    Current Antimicrobial Therapy  Vancomycin PTD, start 11/8, day 3  Zosyn 3.375g IV q8h, start 11/8, day 3    Allergies  Allergies as of 11/07/2020 - Reviewed 11/07/2020   Allergen Reaction Noted    Cefaclor Anaphylaxis 02/24/2005    Ceftriaxone Rash 07/22/2010     Labs  Results from last 7 days   Lab Units 11/10/20  0607 11/09/20  0519 11/08/20  0827   BUN mg/dL 8 7* 9   CREATININE mg/dL 0.69* 0.71* 0.74*     Results from last 7 days   Lab Units 11/10/20  0608 11/09/20  1313 11/09/20  0519   WBC 10*3/mm3 5.24 4.53 5.28     Evaluation of Dosing  Last Dose Received in the ED/Outside Facility:             N/A  Is Patient on Dialysis or Renal Replacement: N/A    Ht - 157.5 cm (62\")  Wt - 102 kg (224 lb 1.6 oz)    Estimated Creatinine Clearance: 115.6 mL/min (A) (by C-G formula based on SCr of 0.69 mg/dL (L)).  Intake & Output (last 3 days)         11/07 0701 - 11/08 0700 11/08 0701 - 11/09 0700 11/09 0701 - 11/10 0700 11/10 0701 - 11/11 0700    Other   300     NG/GT  200      IV Piggyback  250 300     Total Intake(mL/kg)  450 (4.3) 600 (5.9)     Urine (mL/kg/hr)  1200 (0.5)      Stool  0      Total Output  1200      Net  -750 +600             Urine Unmeasured Occurrence  1 x 2 x     Stool Unmeasured Occurrence  2 x 1 x           Microbiology and Radiology  Microbiology Results (last 10 days)       Procedure Component Value - Date/Time    MRSA Screen, PCR (Inpatient) - Swab, Nares [[id number]]  (Normal) Collected: 11/09/20 1528    Lab Status: Final result Specimen: Swab from Nares Updated: 11/09/20 1732     MRSA PCR Negative    Narrative:      MRSA Negative    Legionella Antigen, Urine - Urine, Urine, Clean Catch [[id number]]  (Normal) Collected: 11/08/20 0555    Lab Status: Final result Specimen: Urine, " Clean Catch Updated: 11/08/20 1254     LEGIONELLA ANTIGEN, URINE Negative    S. Pneumo Ag Urine or CSF - Urine, Urine, Clean Catch [[id number]]  (Normal) Collected: 11/08/20 0555    Lab Status: Final result Specimen: Urine, Clean Catch Updated: 11/08/20 1254     Strep Pneumo Ag Negative    COVID PRE-OP / PRE-PROCEDURE SCREENING ORDER (NO ISOLATION) - Swab, Nasopharynx [[id number]]  (Normal) Collected: 11/08/20 0131    Lab Status: Final result Specimen: Swab from Nasopharynx Updated: 11/08/20 0304    Narrative:      The following orders were created for panel order COVID PRE-OP / PRE-PROCEDURE SCREENING ORDER (NO ISOLATION) - Swab, Nasopharynx.  Procedure                               Abnormality         Status                     ---------                               -----------         ------                     Respiratory Panel PCR w/...[[id number]]  Normal              Final result                 Please view results for these tests on the individual orders.    Respiratory Panel PCR w/COVID-19(SARS-CoV-2) HARMONY/CAN/HARRIET/PAD/COR/MAD/VAZQUEZ In-House, NP Swab in UTM/VTM, 3-4 HR TAT - Swab, Nasopharynx [[id number]]  (Normal) Collected: 11/08/20 0131    Lab Status: Final result Specimen: Swab from Nasopharynx Updated: 11/08/20 0304     ADENOVIRUS, PCR Not Detected     Coronavirus 229E Not Detected     Coronavirus HKU1 Not Detected     Coronavirus NL63 Not Detected     Coronavirus OC43 Not Detected     COVID19 Not Detected     Human Metapneumovirus Not Detected     Human Rhinovirus/Enterovirus Not Detected     Influenza A PCR Not Detected     Influenza A H1 Not Detected     Influenza A H1 2009 PCR Not Detected     Influenza A H3 Not Detected     Influenza B PCR Not Detected     Parainfluenza Virus 1 Not Detected     Parainfluenza Virus 2 Not Detected     Parainfluenza Virus 3 Not Detected     Parainfluenza Virus 4 Not Detected     RSV, PCR Not Detected     Bordetella pertussis pcr Not Detected     Bordetella parapertussis PCR  Not Detected     Chlamydophila pneumoniae PCR Not Detected     Mycoplasma pneumo by PCR Not Detected    Narrative:      Fact sheet for providers: https://docs.[employer]/wp-content/uploads/OTF7790-7791-AE0.1-EUA-Provider-Fact-Sheet-3.pdf    Fact sheet for patients: https://docs.[employer]/wp-content/uploads/LII3897-1988-FO0.1-EUA-Patient-Fact-Sheet-1.pdf    Blood Culture - Blood, Hand, Right [[id number]] Collected: 11/07/20 2005    Lab Status: Preliminary result Specimen: Blood from Hand, Right Updated: 11/09/20 2045     Blood Culture No growth at 2 days    Blood Culture - Blood, Wrist, Left [[id number]] Collected: 11/07/20 1956    Lab Status: Preliminary result Specimen: Blood from Wrist, Left Updated: 11/09/20 2045     Blood Culture No growth at 2 days    COVID PRE-OP / PRE-PROCEDURE SCREENING ORDER (NO ISOLATION) - Swab, Nasopharynx [[id number]] Collected: 11/02/20 1559    Lab Status: Final result Specimen: Swab from Nasopharynx Updated: 11/03/20 1116    Narrative:      The following orders were created for panel order COVID PRE-OP / PRE-PROCEDURE SCREENING ORDER (NO ISOLATION) - Swab, Nasopharynx.  Procedure                               Abnormality         Status                     ---------                               -----------         ------                     COVID-19,[facility], NP ...[[id number]]                      Final result                 Please view results for these tests on the individual orders.    COVID-19,[facility], NP SWAB IN LEXAR VIRAL TRANSPORT MEDIA 24-30 HR TAT - Swab, Nasopharynx [[id number]] Collected: 11/02/20 1559    Lab Status: Final result Specimen: Swab from Nasopharynx Updated: 11/03/20 1116     SARS-CoV-2 JASON Not Detected          Reported Vancomycin Levels  Results from last 7 days   Lab Units 11/10/20  0730   VANCOMYCIN TR mcg/mL 9.20     [facility] AUC Calculation  Current AUC:   383 mg/L*hr    Predicted Steady State AUC on Current Dose: 366  mg/L*hr  _________________________________  Predicted Steady State AUC on New Dose:   548 mg/L*hr    Assessment/Plan  Pharmacy to dose vancomycin for PNA in this 60yoM. He received a 2250mg (21.4mg/kg)  vancomycin loading dose, followed by a vancomycin 1250mg IV q18h maintenance regimen. Today's trough is subtherapeutic, and AUC is below goal. Will therefore increase the dose to vancomycin 1250mg (11.9mg/kg) IV q12h.   Repeat a trough prior to the 3rd dose of this regimen, Thursday 11/12 @ 0900.  MRSA PCR is negative, blood cultures NGTD. SCr and WBC count are stable and WNL.   Pharmacy will continue to monitor and adjust vancomycin dose as necessary based on renal function, cultures, labs, and clinical status.    Thank you,  Katie Burroughs, PharmD, BCPS  11/10/2020  08:38 EST

## 2020-11-11 VITALS
DIASTOLIC BLOOD PRESSURE: 75 MMHG | WEIGHT: 229 LBS | OXYGEN SATURATION: 93 % | HEIGHT: 62 IN | HEART RATE: 71 BPM | BODY MASS INDEX: 42.14 KG/M2 | TEMPERATURE: 98.6 F | RESPIRATION RATE: 16 BRPM | SYSTOLIC BLOOD PRESSURE: 137 MMHG

## 2020-11-11 PROBLEM — J95.89 POST-OP PNEUMONIA: Status: RESOLVED | Noted: 2020-11-08 | Resolved: 2020-11-11

## 2020-11-11 PROBLEM — J18.9 POST-OP PNEUMONIA: Status: RESOLVED | Noted: 2020-11-08 | Resolved: 2020-11-11

## 2020-11-11 LAB — POTASSIUM SERPL-SCNC: 4 MMOL/L (ref 3.5–5.2)

## 2020-11-11 PROCEDURE — 25010000002 PIPERACILLIN SOD-TAZOBACTAM PER 1 G: Performed by: NURSE PRACTITIONER

## 2020-11-11 PROCEDURE — 94799 UNLISTED PULMONARY SVC/PX: CPT

## 2020-11-11 PROCEDURE — G0378 HOSPITAL OBSERVATION PER HR: HCPCS

## 2020-11-11 PROCEDURE — 96366 THER/PROPH/DIAG IV INF ADDON: CPT

## 2020-11-11 PROCEDURE — 99217 PR OBSERVATION CARE DISCHARGE MANAGEMENT: CPT | Performed by: INTERNAL MEDICINE

## 2020-11-11 PROCEDURE — 94660 CPAP INITIATION&MGMT: CPT

## 2020-11-11 PROCEDURE — 96367 TX/PROPH/DG ADDL SEQ IV INF: CPT

## 2020-11-11 PROCEDURE — 84132 ASSAY OF SERUM POTASSIUM: CPT | Performed by: INTERNAL MEDICINE

## 2020-11-11 RX ORDER — AMOXICILLIN AND CLAVULANATE POTASSIUM 600; 42.9 MG/5ML; MG/5ML
600 POWDER, FOR SUSPENSION ORAL 2 TIMES DAILY
Qty: 40 ML | Refills: 0 | Status: SHIPPED | OUTPATIENT
Start: 2020-11-11 | End: 2020-11-11 | Stop reason: SDUPTHER

## 2020-11-11 RX ORDER — IPRATROPIUM BROMIDE AND ALBUTEROL SULFATE 2.5; .5 MG/3ML; MG/3ML
3 SOLUTION RESPIRATORY (INHALATION) EVERY 4 HOURS PRN
Status: DISCONTINUED | OUTPATIENT
Start: 2020-11-11 | End: 2020-11-11 | Stop reason: HOSPADM

## 2020-11-11 RX ORDER — OXYCODONE AND ACETAMINOPHEN 10; 325 MG/1; MG/1
1 TABLET ORAL EVERY 6 HOURS PRN
Qty: 12 TABLET | Refills: 0 | Status: SHIPPED | OUTPATIENT
Start: 2020-11-11

## 2020-11-11 RX ORDER — AMOXICILLIN AND CLAVULANATE POTASSIUM 600; 42.9 MG/5ML; MG/5ML
600 POWDER, FOR SUSPENSION ORAL 2 TIMES DAILY
Qty: 75 ML | Refills: 0 | Status: SHIPPED | OUTPATIENT
Start: 2020-11-11 | End: 2020-11-15

## 2020-11-11 RX ORDER — OXYCODONE AND ACETAMINOPHEN 10; 325 MG/1; MG/1
1 TABLET ORAL EVERY 6 HOURS PRN
Qty: 12 TABLET | Refills: 0 | Status: SHIPPED | OUTPATIENT
Start: 2020-11-11 | End: 2020-11-11 | Stop reason: SDUPTHER

## 2020-11-11 RX ADMIN — METOCLOPRAMIDE 10 MG: 10 TABLET ORAL at 12:12

## 2020-11-11 RX ADMIN — ASPIRIN 81 MG CHEWABLE TABLET 81 MG: 81 TABLET CHEWABLE at 08:30

## 2020-11-11 RX ADMIN — METOPROLOL TARTRATE 25 MG: 25 TABLET, FILM COATED ORAL at 08:39

## 2020-11-11 RX ADMIN — METOCLOPRAMIDE 10 MG: 10 TABLET ORAL at 08:29

## 2020-11-11 RX ADMIN — IPRATROPIUM BROMIDE AND ALBUTEROL SULFATE 3 ML: 2.5; .5 SOLUTION RESPIRATORY (INHALATION) at 03:53

## 2020-11-11 RX ADMIN — GABAPENTIN 600 MG: 300 CAPSULE ORAL at 08:44

## 2020-11-11 RX ADMIN — Medication 2000 UNITS: at 08:30

## 2020-11-11 RX ADMIN — CALCIUM CARBONATE 750 MG: 750 TABLET ORAL at 01:30

## 2020-11-11 RX ADMIN — ESCITALOPRAM OXALATE 20 MG: 20 TABLET ORAL at 08:45

## 2020-11-11 RX ADMIN — OXYCODONE 10 MG: 5 TABLET ORAL at 00:19

## 2020-11-11 RX ADMIN — TRAZODONE HYDROCHLORIDE 50 MG: 50 TABLET ORAL at 08:31

## 2020-11-11 RX ADMIN — RISPERIDONE 1 MG: 1 TABLET ORAL at 08:30

## 2020-11-11 RX ADMIN — OXYCODONE 10 MG: 5 TABLET ORAL at 12:13

## 2020-11-11 RX ADMIN — OXYCODONE 10 MG: 5 TABLET ORAL at 05:50

## 2020-11-11 RX ADMIN — TOPIRAMATE 50 MG: 25 TABLET, FILM COATED ORAL at 08:30

## 2020-11-11 RX ADMIN — LEVOTHYROXINE SODIUM 88 MCG: 88 TABLET ORAL at 05:50

## 2020-11-11 RX ADMIN — MINERAL SUPPLEMENT IRON 300 MG / 5 ML STRENGTH LIQUID 100 PER BOX UNFLAVORED 300 MG: at 08:30

## 2020-11-11 RX ADMIN — TAZOBACTAM SODIUM AND PIPERACILLIN SODIUM 3.38 G: 375; 3 INJECTION, SOLUTION INTRAVENOUS at 08:40

## 2020-11-11 RX ADMIN — TAZOBACTAM SODIUM AND PIPERACILLIN SODIUM 3.38 G: 375; 3 INJECTION, SOLUTION INTRAVENOUS at 00:19

## 2020-11-11 RX ADMIN — NITROGLYCERIN 1 PATCH: 0.4 PATCH TRANSDERMAL at 08:31

## 2020-11-11 RX ADMIN — LORAZEPAM 1 MG: 1 TABLET ORAL at 08:29

## 2020-11-11 NOTE — PROGRESS NOTES
Case Management Discharge Note      Final Note: Spoke with patient in room.  His plan is still to return home with [facility].  Referral, H&P and discharge summary faxed to [phone].  Notified [facility] that patient would be discharging today.  [facility] to provide tube feeding.  Patient's wife will  after discharge.  They deny any other discharge needs.  Tosha Loja RN [phone]         Selected Continued Care - Admitted Since 11/7/2020     Destination    No services have been selected for the patient.              Durable Medical Equipment    No services have been selected for the patient.              Dialysis/Infusion     Service Provider Selected Services Address Phone Fax    [facility]  [facility] [address] [facility] [address] [phone]          Home Medical Care Coordination complete    Service Provider Selected Services Address Phone Fax    [facility] [address] [place] [address] [phone]          Therapy    No services have been selected for the patient.              Community Resources    No services have been selected for the patient.                Selected Continued Care - Prior Encounters Includes selections from prior encounters from 8/9/2020 to 11/11/2020    Discharged on 11/6/2020 Admission date: 11/5/2020 - Discharge disposition: Home or Self Care    Home Medical Care     Service Provider Selected Services Address Phone Fax    [facility]  Home Health Services [address] [address] [phone]                         Final Discharge Disposition Code: 06 - home with home health care

## 2020-11-11 NOTE — PROGRESS NOTES
"Patient Name:  Sonido Villaseñor  YOB: 1960  [id number]    Surgery Progress Note    Date of visit: 11/11/2020    Subjective   Subjective: Feeling better, ready to go home.         Objective     Objective:     /78 (BP Location: Left arm, Patient Position: Lying)   Pulse 75   Temp 98.2 °F (36.8 °C) (Oral)   Resp 18   Ht 157.5 cm (62\")   Wt 104 kg (229 lb)   SpO2 93%   BMI 41.88 kg/m²     Intake/Output Summary (Last 24 hours) at 11/11/2020 0807  Last data filed at 11/11/2020 0551  Gross per 24 hour   Intake 2569 ml   Output --   Net 2569 ml       CV:  Rhythm  regular and rate regular   L:  Rhonchi to auscultation bilaterally (baseline)  Abd:  Bowel sounds positive , soft, incisions amd PEG c/d/i  Ext:  No cyanosis, clubbing, edema    Recent labs that are back at this time have been reviewed.        Assessment/Plan     Assessment/ Plan:    Hospital Problem List     * (Principal) Post-op pneumonia - Stable. OK to go home from my standpoint. RTC with me in 3 weeks.      Dysphagia    Hypertensive disorder        Obstructive sleep apnea syndrome    History of throat cancer    Chronic pain              Kevin Fajardo MD  11/11/2020  08:07 EST      "

## 2020-11-11 NOTE — PROGRESS NOTES
Nutrition Services    Patient Name:  Sonido Villaseñor  YOB: 1960  MRN: [id number]  Admit Date:  11/7/2020    Patient discharging home today.    Per hospitalist note (11/10):  Dysphagia  - with all consistencies per Speech  - continue tube feeds -- per spouse, patient eats a regular diet at home, and would like to return to this manner of eating, but agreeable to tube feeds while treating aspiration pneumonia.    RD reviewed the following two home tube feeding regimens with patient's wife:    1. EN regimen to meet 100% nutrition needs if NOT eating by mouth  • Isosource 1.5  • 5 cartons daily  • Each feeding = 30 ml water flush, 1 carton (250 ml), 30 ml water flush  • ~3 hours between each feeding  • Additional water flushes throughout the day to provide additional 480 ml/2 cups water    This regimen provides 1250 ml formula, 1875 calories, 85 g protein, 19 g fiber, 955 ml water from formula, 1735 ml total water from formula + flushes      2. EN regimen to supplemental oral intake  • Isosource 1.5  • 2 cartons daily  • Each feeding = 30 ml water flush, 1 carton (250 ml), 30 ml water flush  • Additional water flushes throughout the day to provide additional 480 ml/2 cups water. This is a minimum amount. May increase amount as needed to maintain hydration.    This regimen provides 500 ml formula, 750 calories, 34 g protein, 8 g fiber, 382 ml water from formula, and 862 ml total water from formula + flushes (based on the minimum amount of 480 ml/2 cups water)        Electronically signed by:  Neelam Shepherd RD  11/11/20 11:49 EST

## 2020-11-11 NOTE — DISCHARGE SUMMARY
[facility] Medicine Services  DISCHARGE SUMMARY    Patient Name: Sonido Villaseñor  : 1960  MRN: [id number]    Date of Admission: 2020  7:15 PM  Date of Discharge:  2020  Primary Care Physician: Jorge Fernandez MD    Consults     Date and Time Order Name Status Description    2020 0149 Inpatient General Surgery Consult Completed           Hospital Course     Presenting Problem:   Post-op pneumonia [J95.89, J18.9]  Post-op pneumonia [J95.89, J18.9]  Post-op pneumonia [J95.89, J18.9]  Post-op pneumonia [J95.89, J18.9]    Active Hospital Problems    Diagnosis  POA   • History of throat cancer [Z85.819]  Yes   • Chronic pain [G89.29]  Yes   • Dysphagia [R13.10]  Yes   • Obstructive sleep apnea syndrome [G47.33]  Yes   • Hypertensive disorder [I10]  Yes      Resolved Hospital Problems    Diagnosis Date Resolved POA   • **Post-op pneumonia [J95.89, J18.9] 2020 Yes          Hospital Course:  Sonido Villaseñor is a 60 y.o. male with history of throat cancer and chronic dysphagia, RENO on Trilogy at home and recent cholecystectomy presents with abdominal pain and aspiration pneumonia     Aspiration pneumonia  Dysphagia  - Upon arrival patient placed on IV zosyn and responded well. He will transition to augmentin liquid via tube for 3 more days at d/c.  - Was seen by SLP and is aspirating all consistencies per Speech. Continue tube feeds -- per spouse, patient eats a regular diet at home, and would like to return to this manner of eating. If he truly wishes to eat comfort diet at home will need to consider AND status. Can discuss on ongoing basis as outpt.    Discharge Follow Up Recommendations for outpatient labs/diagnostics:   PCP in 1-2 weeks   Dr. Fajardo in 3 weeks    Day of Discharge     HPI: Lying in bed with spouse in room. Feels better. Wants to go home.    Review of Systems  Gen- No fevers, chills  CV- No chest pain, palpitations  Resp- No cough, dyspnea  GI- No N/V/D,  abd pain    Vital Signs:   Temp:  [98.2 °F (36.8 °C)-99.9 °F (37.7 °C)] 98.6 °F (37 °C)  Heart Rate:  [] 71  Resp:  [16-20] 16  BP: (128-142)/(75-79) 137/75     Physical Exam:  Constitutional: No acute distress, awake, alert, weak appearing  HENT: NCAT, mucous membranes moist  Respiratory: Clear to auscultation bilaterally, respiratory effort normal   Cardiovascular: RRR, no murmurs, rubs, or gallops, palpable pedal pulses bilaterally  Gastrointestinal: Positive bowel sounds, soft, nontender, nondistended  Musculoskeletal: No bilateral ankle edema  Psychiatric: Appropriate affect, cooperative  Neurologic: Oriented x 3, strength symmetric in all extremities, Cranial Nerves grossly intact to confrontation, speech clear  Skin: No rashes    Pertinent  and/or Most Recent Results     Results from last 7 days   Lab Units 11/11/20  0226 11/10/20  0608 11/10/20  0607 11/09/20  2045 11/09/20  1313 11/09/20  0519 11/08/20  2242 11/08/20  0827 11/07/20  1803 11/06/20  0551   WBC 10*3/mm3  --  5.24  --   --  4.53 5.28  --  5.52 7.51 11.75*   HEMOGLOBIN g/dL  --  10.5*  --   --  9.6* 9.7*  --  10.0* 10.2* 11.5*   HEMATOCRIT %  --  32.9*  --   --  30.3* 31.0*  --  31.6* 32.7* 36.7*   PLATELETS 10*3/mm3  --  113*  --   --  102* 89*  --  72* 84* 99*   SODIUM mmol/L  --   --  138  --   --  138  --  139 136 139   POTASSIUM mmol/L 4.0  --  3.1* 3.6  --  3.4* 3.7 3.1* 3.5 3.6   CHLORIDE mmol/L  --   --  104  --   --  105  --  104 100 104   CO2 mmol/L  --   --  22.0  --   --  21.0*  --  26.0 25.0 27.0   BUN mg/dL  --   --  8  --   --  7*  --  9 12 11   CREATININE mg/dL  --   --  0.69*  --   --  0.71*  --  0.74* 0.84 0.79   GLUCOSE mg/dL  --   --  118*  --   --  94  --  108* 103* 137*   CALCIUM mg/dL  --   --  8.7  --   --  8.1*  --  8.3* 8.7 8.6     Results from last 7 days   Lab Units 11/07/20  1803 11/06/20  0551   BILIRUBIN mg/dL 0.3 0.3   ALK PHOS U/L 18* 17*   ALT (SGPT) U/L 18 23   AST (SGOT) U/L 21 28           Invalid  input(s): TG, LDLCALC, LDLREALC  Results from last 7 days   Lab Units 11/10/20  0607 11/08/20  0827 11/07/20  1803   TSH uIU/mL  --  3.500  --    HEMOGLOBIN A1C %  --  5.00  --    PROBNP pg/mL 893.3  --   --    PROCALCITONIN ng/mL 0.23  --  0.63*   LACTATE mmol/L  --   --  1.5       Brief Urine Lab Results  (Last result in the past 365 days)      Color   Clarity   Blood   Leuk Est   Nitrite   Protein   CREAT   Urine HCG        11/07/20 2038 Yellow Clear Trace Negative Negative Trace               Microbiology Results Abnormal     Procedure Component Value - Date/Time    Blood Culture - Blood, Hand, Right [[id number]] Collected: 11/07/20 2005    Lab Status: Preliminary result Specimen: Blood from Hand, Right Updated: 11/10/20 2045     Blood Culture No growth at 3 days    Blood Culture - Blood, Wrist, Left [[id number]] Collected: 11/07/20 1956    Lab Status: Preliminary result Specimen: Blood from Wrist, Left Updated: 11/10/20 2045     Blood Culture No growth at 3 days    MRSA Screen, PCR (Inpatient) - Swab, Nares [[id number]]  (Normal) Collected: 11/09/20 1528    Lab Status: Final result Specimen: Swab from Nares Updated: 11/09/20 1732     MRSA PCR Negative    Narrative:      MRSA Negative    S. Pneumo Ag Urine or CSF - Urine, Urine, Clean Catch [[id number]]  (Normal) Collected: 11/08/20 0555    Lab Status: Final result Specimen: Urine, Clean Catch Updated: 11/08/20 1254     Strep Pneumo Ag Negative    Legionella Antigen, Urine - Urine, Urine, Clean Catch [[id number]]  (Normal) Collected: 11/08/20 0555    Lab Status: Final result Specimen: Urine, Clean Catch Updated: 11/08/20 1254     LEGIONELLA ANTIGEN, URINE Negative    COVID PRE-OP / PRE-PROCEDURE SCREENING ORDER (NO ISOLATION) - Swab, Nasopharynx [[id number]]  (Normal) Collected: 11/08/20 0131    Lab Status: Final result Specimen: Swab from Nasopharynx Updated: 11/08/20 0304    Narrative:      The following orders were created for panel order COVID PRE-OP /  PRE-PROCEDURE SCREENING ORDER (NO ISOLATION) - Swab, Nasopharynx.  Procedure                               Abnormality         Status                     ---------                               -----------         ------                     Respiratory Panel PCR w/...[[id number]]  Normal              Final result                 Please view results for these tests on the individual orders.    Respiratory Panel PCR w/COVID-19(SARS-CoV-2) HARMONY/CAN/HARRIET/PAD/COR/MAD/VAZQUEZ In-House, NP Swab in UTM/VTM, 3-4 HR TAT - Swab, Nasopharynx [[id number]]  (Normal) Collected: 11/08/20 0131    Lab Status: Final result Specimen: Swab from Nasopharynx Updated: 11/08/20 0304     ADENOVIRUS, PCR Not Detected     Coronavirus 229E Not Detected     Coronavirus HKU1 Not Detected     Coronavirus NL63 Not Detected     Coronavirus OC43 Not Detected     COVID19 Not Detected     Human Metapneumovirus Not Detected     Human Rhinovirus/Enterovirus Not Detected     Influenza A PCR Not Detected     Influenza A H1 Not Detected     Influenza A H1 2009 PCR Not Detected     Influenza A H3 Not Detected     Influenza B PCR Not Detected     Parainfluenza Virus 1 Not Detected     Parainfluenza Virus 2 Not Detected     Parainfluenza Virus 3 Not Detected     Parainfluenza Virus 4 Not Detected     RSV, PCR Not Detected     Bordetella pertussis pcr Not Detected     Bordetella parapertussis PCR Not Detected     Chlamydophila pneumoniae PCR Not Detected     Mycoplasma pneumo by PCR Not Detected    Narrative:      Fact sheet for providers: https://docs."[employer]/wp-content/uploads/FWT4449-7400-BQ3.1-EUA-Provider-Fact-Sheet-3.pdf    Fact sheet for patients: https://docs."[employer]/wp-content/uploads/IMU6343-8815-PO4.1-EUA-Patient-Fact-Sheet-1.pdf          Imaging Results (All)     Procedure Component Value Units Date/Time    XR Chest 1 View [[id number]] Collected: 11/10/20 0804     Updated: 11/10/20 1308    Narrative:      EXAMINATION: XR CHEST 1 VW- 11/10/2020      INDICATION: pneumonia; J95.89-Other postprocedural complications and  disorders of respiratory system, not elsewhere classified;  J18.9-Pneumonia, unspecified organism; R50.82-Postprocedural fever;  R10.84-Generalized abdominal pain; R13.10-Dysphagia, unspecified      COMPARISON: Chest x-ray 11/07/2020     FINDINGS: Cardiac size enlarged with increased central pulmonary  vascularity and low lung volumes with hypoventilatory effects similar to  prior. No pneumothorax with potential trace left effusion versus  airspace disease of left lung base.       Impression:      Low lung volumes with hypoventilatory effects demonstrate  opacifications at the left lung base.  Atelectasis versus minimal  airspace disease with potential trace layering effusion overall similar  appearance to prior without new consolidation or large effusion  development.     D:  11/10/2020  E:  11/10/2020     This report was finalized on 11/10/2020 1:05 PM by Dr. Oscar Brooks.       CT Abdomen Pelvis With Contrast [[id number]] Collected: 11/07/20 2350     Updated: 11/07/20 2352    Narrative:      CT Abdomen Pelvis W    INDICATION:   Abdominal pain and fever. Postop patient. Recent cholecystectomy and G-tube placement.    TECHNIQUE:   CT of the abdomen and pelvis with IV contrast. Coronal and sagittal reconstructions were obtained.  Radiation dose reduction techniques included automated exposure control or exposure modulation based on body size. Count of known CT and cardiac nuc med  studies performed in previous 12 months: 0.     COMPARISON:   None available.    FINDINGS:  Abdomen: Small pleural effusions and bibasilar atelectasis or pneumonia. Background emphysema. Large hiatal hernia. Normal caliber aorta and atherosclerotic change. Status post ileac stent graft placement. Spleen adrenal glands and pancreas are negative.  Gallbladder surgically absent. There is some induration of the fat planes in the gallbladder fossa and adjacent to the second  portion of the duodenum, likely related to recent surgery. No drainable fluid collection. Probable physiologic prominence of the  extrahepatic common bile duct. G-tube in stomach. Negative kidneys.    Pelvis: Negative bladder and prostate gland. No drainable fluid collection. High riding sigmoid colon. No bowel obstruction or focal inflammatory change of the bowel. There is redundancy of the ascending colon which is positioned in the right upper  quadrant. The appendix is dilated up to 9 mm. No distinct inflammatory change of the appendix otherwise identified but this is technically abnormal and early acute appendicitis could present similarly. Negative inguinal canals. No suspicious bone lesion.      Impression:      1. Postoperative changes of cholecystectomy with induration of the fat planes in the gallbladder fossa without associated drainable fluid collection to suggest abscess. No free air.  2. The appendix is dilated up to 9 mm. No additional evidence of acute appendicitis but early acute appendicitis is in the differential in the appropriate clinical context.  3. Bibasilar atelectasis or pneumonia and small effusions.  4. Large hiatal hernia.            Signer Name: Andres Luna MD   Signed: 11/7/2020 11:50 PM   Workstation Name: [email or web address]    [facility]    XR Chest 1 View [[id number]] Collected: 11/07/20 2112     Updated: 11/07/20 2114    Narrative:      CR Chest 1 Vw    INDICATION:   Postcholecystectomy with cough. Patient very kyphotic.     COMPARISON:    Chest x-ray from 11/2/2020.    FINDINGS:  Single portable AP view(s) of the chest.  Film is quite lordotic. The postoperative heart is enlarged. There is airspace disease at the left greater than right lung base. There may be layering pleural fluid on the left. Its difficult to compare current  and prior study due to different positioning. There is probably mild interstitial edema.      Impression:      Study is considerably  limited by lordotic positioning. Allowing for this there is probably no interval change to slight worsening in bibasilar left greater than right-sided airspace disease and postoperative cardiac silhouette enlargement. There may be  mild interstitial edema. No pneumothorax. Follow-up recommended.     Signer Name: Frances Medrano MD   Signed: 11/7/2020 9:12 PM   Workstation Name: [email or web address]    [facility]                         Discharge Details        Discharge Medications      New Medications      Instructions Start Date   amoxicillin-clavulanate 600-42.9 MG/5ML suspension  Commonly known as: Augmentin ES-600   600 mg, Oral, 2 Times Daily         Changes to Medications      Instructions Start Date   oxyCODONE ER 20 MG 12 hr tablet  Commonly known as: oxyCONTIN  What changed: Another medication with the same name was removed. Continue taking this medication, and follow the directions you see here.   20 mg, Oral, Every 12 Hours         Continue These Medications      Instructions Start Date   aspirin 81 MG EC tablet   81 mg, Oral, Daily      butalbital-acetaminophen-caffeine -40 MG per tablet  Commonly known as: FIORICET, ESGIC   1 tablet, Oral, 2 times daily, No more than 2-3 times per week       docusate sodium 150 MG/15ML liquid  Commonly known as: COLACE   100 mg, Oral, Daily      escitalopram 20 MG tablet  Commonly known as: LEXAPRO   20 mg, Oral, 2 times daily      esomeprazole 40 MG capsule  Commonly known as: nexIUM   40 mg, Oral, Every Morning Before Breakfast      ezetimibe 10 MG tablet  Commonly known as: ZETIA   10 mg, Oral, Nightly      ferrous sulfate 325 (65 FE) MG tablet   325 mg, Oral, Daily With Breakfast      fexofenadine-pseudoephedrine 180-240 MG per 24 hr tablet  Commonly known as: ALLEGRA-D 24   1 tablet, Oral, Daily PRN      gabapentin 400 MG capsule  Commonly known as: NEURONTIN   600 mg, Oral, 3 Times Daily      levothyroxine 88 MCG tablet  Commonly known as:  SYNTHROID, LEVOTHROID   88 mcg, Oral, Daily      linaclotide 290 MCG capsule capsule  Commonly known as: LINZESS   290 mcg, Oral, Every Morning Before Breakfast      LORazepam 2 MG tablet  Commonly known as: ATIVAN   2 mg, Oral, Every 6 Hours PRN      Melatonin 10 MG tablet   Oral, Nightly      metoclopramide 10 MG tablet  Commonly known as: REGLAN   10 mg, Oral, 4 Times Daily Before Meals & Nightly      metoprolol tartrate 25 MG tablet  Commonly known as: LOPRESSOR   25 mg, Oral, 2 Times Daily      nitroglycerin 0.4 MG/HR patch  Commonly known as: NITRODUR   1 patch, Transdermal, Daily      nitroglycerin 0.4 MG SL tablet  Commonly known as: NITROSTAT   0.4 mg, Sublingual, Every 5 Minutes PRN, Take no more than 3 doses in 15 minutes.       oxyCODONE-acetaminophen  MG per tablet  Commonly known as: PERCOCET   1 tablet, Oral, Every 6 Hours PRN      risperiDONE 1 MG tablet  Commonly known as: risperDAL   1 mg, Oral, 2 Times Daily      rosuvastatin 10 MG tablet  Commonly known as: CRESTOR   10 mg, Oral, Nightly      tiZANidine 4 MG tablet  Commonly known as: ZANAFLEX   4 mg, Oral, Every 8 Hours PRN      topiramate 50 MG tablet  Commonly known as: TOPAMAX   50 mg, Oral, 2 Times Daily      traZODone 100 MG tablet  Commonly known as: DESYREL   50 mg, Oral, 2 times daily, 1/2 in morning and 1 1/2 at bed       Vitamin D 50 MCG (2000 UT) tablet   2,000 Units, Oral, Daily         Stop These Medications    amoxicillin 875 MG tablet  Commonly known as: AMOXIL            Allergies   Allergen Reactions   • Cefaclor Anaphylaxis   • Ceftriaxone Rash         Discharge Disposition:  Home or Self Care    Diet:  Hospital:  Diet Order   Procedures   • NPO Diet            CODE STATUS:    Code Status and Medical Interventions:   Ordered at: 11/08/20 0018     Code Status:    CPR     Medical Interventions (Level of Support Prior to Arrest):    Full       No future appointments.    Additional Instructions for the Follow-ups that You  Need to Schedule     Ambulatory Referral to Home Health   As directed      Face to Face Visit Date: 11/10/2020    Follow-up provider for Plan of Care?: I treated the patient in an acute care facility and will not continue treatment after discharge.    Follow-up provider: AGNIESZKA MAYA [[id number]]    Reason/Clinical Findings: hospital admit, chronic illness weakness debility    Describe mobility limitations that make leaving home difficult: weakness debility    Nursing/Therapeutic Services Requested: Skilled Nursing Physical Therapy Occupational Therapy    Skilled nursing orders: Medication education (education on disease process) Cardiopulmonary assessments O2 instruction    PT orders: Transfer training Strengthening Home safety assessment    Occupational orders: Activities of daily living Strengthening Home safety assessment    Frequency: 1 Week 1         Discharge Follow-up with Specialty: Dr. Fajardo; 2 Weeks   As directed      Specialty: Dr. Fajardo    Follow Up: 2 Weeks                     Laly Mitchell II,   11/11/20      Time Spent on Discharge:  I spent  32  minutes on this discharge activity which included: face-to-face encounter with the patient, reviewing the data in the system, coordination of the care with the nursing staff as well as consultants, documentation, and entering orders.

## 2020-11-11 NOTE — DISCHARGE PLACEMENT REQUEST
"Van Villaseñor (60 y.o. Male)     Tosha Loja, RN  [phone]      Date of Birth Social Security Number Address Home Phone MRN    1960  PO   [facility] [address] [phone] [id number]    Synagogue Marital Status          Oriental orthodox        Admission Date Admission Type Admitting Provider Attending Provider Department, Room/Bed    20 Emergency Laly Mitchell II, Laly Segura II,  [address], S574/1    Discharge Date Discharge Disposition Discharge Destination         Home or Self Care              Attending Provider: Laly Mitchell II, DO    Allergies: Cefaclor, Ceftriaxone    Isolation: None   Infection: None   Code Status: CPR    Ht: 157.5 cm (62\")   Wt: 104 kg (229 lb)    Admission Cmt: None   Principal Problem: Post-op pneumonia [J95.89,J18.9]                 Active Insurance as of 2020     Primary Coverage     Payor Plan Insurance Group Employer/Plan Group    HUMANA MEDICARE REPLACEMENT HUMANA MEDICARE REPLACEMENT [id number]     Payor Plan Address Payor Plan Phone Number Payor Plan Fax Number Effective Dates    [address] [address] [phone]  2018 - None Entered    [facility] [id number]       Subscriber Name Subscriber Birth Date Member ID       VAN VILLASEÑOR 1960 [id number]                 Emergency Contacts      (Rel.) Home Phone Work Phone Mobile Phone    ADRIANO VILLASEÑOR (Spouse) [phone] -- [phone]        [address] [address]  Phone:  [phone]  Fax:  [phone] Date: Nov 10, 2020      Ambulatory Referral to Home Health     Patient:  Van Villaseñor MRN:  [id number]   PO   [facility] [address] :  1960  SSN:    Phone: [phone] Sex:  M      INSURANCE PAYOR PLAN GROUP # SUBSCRIBER ID   Primary:    HUMANA MEDICARE REPLACEMENT [id number]      Referring Provider Information:  KALEB WASHINGTON Phone: [phone] " Fax: [phone]      Referral Information:   # Visits:  1 Referral Type: Home Health [42]   Urgency:  Routine Referral Reason: Specialty Services Required   Start Date: Nov 10, 2020 End Date:  To be determined by Insurer   Diagnosis: Post-op pneumonia (J95.89,J18.9 [ICD-10-CM] 997.39,486 [ICD-9-CM])  Dysphagia, unspecified type (R13.10 [ICD-10-CM] 787.20 [ICD-9-CM])  Hypertension, unspecified type (I10 [ICD-10-CM] 401.9 [ICD-9-CM])  Obstructive sleep apnea syndrome (G47.33 [ICD-10-CM] 327.23 [ICD-9-CM])  Chronic pain syndrome (G89.4 [ICD-10-CM] 338.4 [ICD-9-CM])  Squamous cell carcinoma of larynx (CMS/HCC) (C32.9 [ICD-10-CM] 161.9 [ICD-9-CM])      Refer to Dept:   Refer to Provider:   Refer to Facility:       Face to Face Visit Date: 11/10/2020  Follow-up provider for Plan of Care? I treated the patient in an acute care facility and will not continue treatment after discharge.  Follow-up provider: AGNIESZKA MAYA [[id number]]  Reason/Clinical Findings: hospital admit, chronic illness weakness debility  Describe mobility limitations that make leaving home difficult: weakness debility  Nursing/Therapeutic Services Requested: Skilled Nursing  Nursing/Therapeutic Services Requested: Physical Therapy  Nursing/Therapeutic Services Requested: Occupational Therapy  Skilled nursing orders: Medication education (education on disease process)  Skilled nursing orders: Cardiopulmonary assessments  Skilled nursing orders: O2 instruction  PT orders: Transfer training  PT orders: Strengthening  PT orders: Home safety assessment  Occupational orders: Activities of daily living  Occupational orders: Strengthening  Occupational orders: Home safety assessment  Frequency: 1 Week 1     This document serves as a request of services and does not constitute Insurance authorization or approval of services.  To determine eligibility, please contact the members Insurance carrier to verify and review coverage.     If you have medical questions  regarding this request for services. Please contact [address] at [phone] during normal business hours.       Verbal Order Mode: Telephone with readback   Authorizing Provider: Lewis Patel MD  Authorizing Provider's NPI: [id number]     Order Entered By: Cayla Juarez RN 11/10/2020  3:59 PM     Electronically signed by:  Lewis Patel MD               History & Physical      Chayito Chin MD at 20 0037              [facility]  HISTORY AND PHYSICAL    Patient Name: Sonido Villaseñor  : 1960  MRN: [id number]  Primary Care Physician: Jorge Fernandez MD  Date of admission: 2020      Subjective   Subjective     Chief Complaint:  Abdominal pain    HPI:  Sonido Villaseñor is a 60 y.o. male with a PMH significant for chronic dysphagia secondary to throat cancer s/p resection and radiation w/ resulting kyphosis and chronic pain as well as anxiety and depression, HTN, HLD, chronic constipation, GERD, seasonal allergies and recent lap drea and PEG placement (20) who presents to [facility] for evaluation of abdominal pain. The patient is alert and cooperative but his wife is present at the bedside and provides most of the HPI; tells me the patient was d/c'd home on 20 and had a difficult time getting his pain medication filled d/t some confusion with his chronic pain medication and the new pain prescription and therefore was not getting any additional pain medication post operatively after d/t home; per her report the patient has had decreased mobility d/t his pain and had only been ambulating from his chair to the bed, he has been standing up at his chair to use the urinal. He has not had an BM since surgery and has not been passing gas. His pain in in the mid abdominal region and radiates side to side. He has had mild nausea but no vomiting and has had 1 can of isosource 1.5 last pm and tolerated this ok. She tells me his  goal is 2 cans daily b/c this is what he agreed to (he has had PEG x 3 in the past and the last one was several years ago and was infected). He is currently on 2.5 liter oxygen and is currently at baseline oxygen. He has had a productive cough, fever, mild shortness of breath and fatigue over the past 24 hours. He follows with pain management and takes high doses of narcotic pain medication at baseline; he is also on chronic benzos. Currently he rates his pain 7/10 and is asking for a pain shot. Denies tobacco, only social alcohol use and no illicit drug use. Denies any known COVID-19 contacts and had a negative test preoperatively.     Current COVID Risks are:  [x] Fever [x]  Cough [x] Shortness of breath [x] Fatigue [] Change in taste or smell    [] Exposure to COVID positive patient  [] High risk facility   []  NONE    Review of Systems   Constitutional: Positive for activity change, appetite change, chills, fatigue and fever.   HENT: Negative.    Respiratory: Positive for cough. Negative for shortness of breath and wheezing.    Cardiovascular: Negative.    Gastrointestinal: Positive for abdominal distention, abdominal pain, constipation and nausea. Negative for blood in stool, diarrhea and vomiting.   Endocrine: Negative.    Genitourinary: Negative.    Musculoskeletal: Positive for arthralgias, back pain (chronic) and neck pain (chronic kyphosis).   Skin: Positive for color change (Right flank bruise) and wound (post op lap sites; new PEG site).   Allergic/Immunologic: Negative.    Neurological: Negative.    Hematological: Bruises/bleeds easily.   Psychiatric/Behavioral: Negative.       All other systems reviewed and are negative.     Personal History     Past Medical History:   Diagnosis Date   • ARDS (adult respiratory distress syndrome) (CMS/HCC)    • Arthritis    • Cancer (CMS/HCC)     throat cancer    • Chest pain    • Constipation    • COPD (chronic obstructive pulmonary disease) (CMS/HCC)    • Dizzy    •  "Dry skin    • Full dentures    • Fungus infection     left thumb- oral antibx use    • GERD (gastroesophageal reflux disease)    • Hard to intubate    • Headache    • Hiatal hernia     still present    • Hypertension    • Migraine    • Nausea and vomiting    • On home oxygen therapy     2-2.5 prn and nightly    • Sleep apnea     bipap machine compliant - \"suped up bipap\"     • SOBOE (shortness of breath on exertion)    • Swallowing difficulty     larger items - from radiation issue    • Wears glasses     readers       Past Surgical History:   Procedure Laterality Date   • BACK SURGERY     • CATARACT EXTRACTION      bilat    • CHOLECYSTECTOMY WITH INTRAOPERATIVE CHOLANGIOGRAM N/A 11/5/2020    Procedure: CHOLECYSTECTOMY LAPAROSCOPIC , IOC;  Surgeon: Kevin Fajardo MD;  Location:  [facility];  Service: General;  Laterality: N/A;   • COLONOSCOPY     • CORONARY ARTERY BYPASS GRAFT      x4    • CYST REMOVAL      right hand ganglion cyst    • ENDOSCOPY     • ENDOSCOPY W/ PEG TUBE PLACEMENT N/A 11/5/2020    Procedure: ESOPHAGOGASTRODUODENOSCOPY WITH PERCUTANEOUS ENDOSCOPIC GASTROSTOMY TUBE INSERTION;  Surgeon: Kevin Fajardo MD;  Location:  [facility];  Service: General;  Laterality: N/A;   • THROAT SURGERY      x2   • TONSILLECTOMY         Family History: family history is not on file. Otherwise pertinent FHx was reviewed and unremarkable.     Social History:  reports that he quit smoking about 22 months ago. His smoking use included cigarettes. He has a 80.00 pack-year smoking history. He quit smokeless tobacco use about 22 months ago.  His smokeless tobacco use included chew. He reports current alcohol use. He reports that he does not use drugs.  Social History     Social History Narrative   • Not on file       Medications:  Available home medication information reviewed.  Medications Prior to Admission   Medication Sig Dispense Refill Last Dose   • amoxicillin (AMOXIL) 875 MG tablet Take 875 mg by mouth 2 (Two) " Times a Day. 10 days -  Started it 30th      • aspirin 81 MG EC tablet Take 81 mg by mouth Daily.      • butalbital-acetaminophen-caffeine (FIORICET, ESGIC) -40 MG per tablet Take 1 tablet by mouth 2 (two) times a day. No more than 2-3 times per week      • Cholecalciferol (Vitamin D) 50 MCG (2000 UT) tablet Take 2,000 Units by mouth Daily.      • docusate sodium (COLACE) 150 MG/15ML liquid Take 10 mL by mouth Daily. 20 mL 0    • escitalopram (LEXAPRO) 20 MG tablet Take 20 mg by mouth 2 (two) times a day.      • esomeprazole (nexIUM) 40 MG capsule Take 40 mg by mouth Every Morning Before Breakfast.      • ezetimibe (ZETIA) 10 MG tablet Take 10 mg by mouth Every Night.      • ferrous sulfate 325 (65 FE) MG tablet Take 325 mg by mouth Daily With Breakfast.      • fexofenadine-pseudoephedrine (ALLEGRA-D 24) 180-240 MG per 24 hr tablet Take 1 tablet by mouth Daily As Needed for Allergies.      • gabapentin (NEURONTIN) 400 MG capsule Take 600 mg by mouth 3 (Three) Times a Day.      • levothyroxine (SYNTHROID, LEVOTHROID) 88 MCG tablet Take 88 mcg by mouth Daily.      • linaclotide (LINZESS) 290 MCG capsule capsule Take 290 mcg by mouth Every Morning Before Breakfast.      • LORazepam (ATIVAN) 2 MG tablet Take 2 mg by mouth Every 6 (Six) Hours As Needed for Anxiety.      • Melatonin 10 MG tablet Take  by mouth Every Night.      • metoclopramide (REGLAN) 10 MG tablet Take 10 mg by mouth 4 (Four) Times a Day Before Meals & at Bedtime.      • metoprolol tartrate (LOPRESSOR) 25 MG tablet Take 25 mg by mouth 2 (Two) Times a Day.      • nitroglycerin (NITRODUR) 0.4 MG/HR patch Place 1 patch on the skin as directed by provider Daily.      • nitroglycerin (NITROSTAT) 0.4 MG SL tablet Place 0.4 mg under the tongue Every 5 (Five) Minutes As Needed for Chest Pain. Take no more than 3 doses in 15 minutes.      • oxyCODONE (oxyCONTIN) 20 MG 12 hr tablet Take 1 tablet by mouth Every 12 (Twelve) Hours. 6 tablet 0    • oxyCODONE ER  (oxyCONTIN) 20 MG 12 hr tablet Take 20 mg by mouth Every 12 (Twelve) Hours.      • oxyCODONE-acetaminophen (PERCOCET)  MG per tablet Take 1 tablet by mouth Every 6 (Six) Hours As Needed for Moderate Pain .      • risperiDONE (risperDAL) 1 MG tablet Take 1 mg by mouth 2 (Two) Times a Day.      • rosuvastatin (CRESTOR) 10 MG tablet Take 10 mg by mouth Every Night.      • tiZANidine (ZANAFLEX) 4 MG tablet Take 4 mg by mouth Every 8 (Eight) Hours As Needed for Muscle Spasms.      • topiramate (TOPAMAX) 50 MG tablet Take 50 mg by mouth 2 (Two) Times a Day.      • traZODone (DESYREL) 100 MG tablet Take 50 mg by mouth 2 (two) times a day. 1/2 in morning and 1 1/2 at bed          Allergies   Allergen Reactions   • Cefaclor Anaphylaxis   • Ceftriaxone Rash       Objective   Objective     Vital Signs:   Temp:  [100.7 °F (38.2 °C)] 100.7 °F (38.2 °C)  Heart Rate:  [77-87] 79  Resp:  [28] 28  BP: (123-179)/(70-86) 150/82        Physical Exam  Constitutional:       General: He is not in acute distress.     Appearance: He is obese. He is ill-appearing (chronically ill appearing). He is not toxic-appearing or diaphoretic.   HENT:      Head: Normocephalic and atraumatic.      Nose: Nose normal. No congestion or rhinorrhea.      Mouth/Throat:      Mouth: Mucous membranes are dry.      Pharynx: Oropharynx is clear. No oropharyngeal exudate.   Eyes:      General: No scleral icterus.     Extraocular Movements: Extraocular movements intact.      Pupils: Pupils are equal, round, and reactive to light.   Neck:      Musculoskeletal: Neck rigidity (chronic) present.      Comments: Chronic kyphosis w/ decreased ROM    Cardiovascular:      Rate and Rhythm: Normal rate and regular rhythm.      Pulses: Normal pulses.      Heart sounds: Normal heart sounds. No murmur.   Pulmonary:      Effort: Pulmonary effort is normal. No respiratory distress.      Breath sounds: Decreased breath sounds (diminished lung sounds throughout) present. No  wheezing, rhonchi or rales.   Abdominal:      General: There is distension.      Palpations: Abdomen is soft.      Tenderness: There is abdominal tenderness. There is no guarding.          Comments: Lap sites x 4 intact w/o drainage   Musculoskeletal:      Right lower leg: Edema (1+ bilaterally) present.      Left lower leg: Edema present.   Skin:     General: Skin is warm and dry.      Capillary Refill: Capillary refill takes less than 2 seconds.      Findings: Bruising present.   Neurological:      General: No focal deficit present.      Mental Status: He is alert and oriented to person, place, and time.   Psychiatric:         Mood and Affect: Mood normal. Affect is flat.         Speech: Speech is delayed.         Behavior: Behavior is slowed.      Comments: Flat affect w/ delayed response to answer       Results Reviewed: CMP w/ glu 103, alk phos 18, albumin 3.30; CBC w/ Hgb 10.2, Hct 32.7; chest xray = limited d/t positioning, slight worsening in bibasilar L > R airspace disease and post op cardiac silhouette enlargement, may be mild interstitial edema w/o pneumothorax; urinalysis w/ trace ketones/blood/protein; CT abd/pelvis = dilated appendix, no evidence of acute appendicitis; large hiatal hernia; post drea changes w/o free air; bibasilar atelectasis or pneumonia and small effusions    I have personally reviewed most recent indicated data and agree with findings including:  [x]  Laboratory  [x]  Radiology  []  EKG/Telemetry  []  Pathology  []  Cardiac/Vascular Studies  []  Old records  []  Other:  Most pertinent findings include:see above      Results from last 7 days   Lab Units 11/07/20  1803  11/02/20  1346   WBC 10*3/mm3 7.51   < > 6.33   HEMOGLOBIN g/dL 10.2*   < > 12.9*   HEMATOCRIT % 32.7*   < > 41.6   PLATELETS 10*3/mm3 84*   < > 112*   INR   --   --  1.08    < > = values in this interval not displayed.     Results from last 7 days   Lab Units 11/07/20  1803   SODIUM mmol/L 136   POTASSIUM mmol/L 3.5    CHLORIDE mmol/L 100   CO2 mmol/L 25.0   BUN mg/dL 12   CREATININE mg/dL 0.84   GLUCOSE mg/dL 103*   CALCIUM mg/dL 8.7   ALT (SGPT) U/L 18   AST (SGOT) U/L 21   LACTATE mmol/L 1.5     Estimated Creatinine Clearance: 97.5 mL/min (by C-G formula based on SCr of 0.84 mg/dL).  Brief Urine Lab Results  (Last result in the past 365 days)      Color   Clarity   Blood   Leuk Est   Nitrite   Protein   CREAT   Urine HCG        11/07/20 2038 Yellow Clear Trace Negative Negative Trace             Imaging Results (Last 24 Hours)     Procedure Component Value Units Date/Time    CT Abdomen Pelvis With Contrast [[id number]] Collected: 11/07/20 2350     Updated: 11/07/20 2352    Narrative:      CT Abdomen Pelvis W    INDICATION:   Abdominal pain and fever. Postop patient. Recent cholecystectomy and G-tube placement.    TECHNIQUE:   CT of the abdomen and pelvis with IV contrast. Coronal and sagittal reconstructions were obtained.  Radiation dose reduction techniques included automated exposure control or exposure modulation based on body size. Count of known CT and cardiac nuc med  studies performed in previous 12 months: 0.     COMPARISON:   None available.    FINDINGS:  Abdomen: Small pleural effusions and bibasilar atelectasis or pneumonia. Background emphysema. Large hiatal hernia. Normal caliber aorta and atherosclerotic change. Status post ileac stent graft placement. Spleen adrenal glands and pancreas are negative.  Gallbladder surgically absent. There is some induration of the fat planes in the gallbladder fossa and adjacent to the second portion of the duodenum, likely related to recent surgery. No drainable fluid collection. Probable physiologic prominence of the  extrahepatic common bile duct. G-tube in stomach. Negative kidneys.    Pelvis: Negative bladder and prostate gland. No drainable fluid collection. High riding sigmoid colon. No bowel obstruction or focal inflammatory change of the bowel. There is redundancy of the  ascending colon which is positioned in the right upper  quadrant. The appendix is dilated up to 9 mm. No distinct inflammatory change of the appendix otherwise identified but this is technically abnormal and early acute appendicitis could present similarly. Negative inguinal canals. No suspicious bone lesion.      Impression:      1. Postoperative changes of cholecystectomy with induration of the fat planes in the gallbladder fossa without associated drainable fluid collection to suggest abscess. No free air.  2. The appendix is dilated up to 9 mm. No additional evidence of acute appendicitis but early acute appendicitis is in the differential in the appropriate clinical context.  3. Bibasilar atelectasis or pneumonia and small effusions.  4. Large hiatal hernia.            Signer Name: Andres Luna MD   Signed: 11/7/2020 11:50 PM   Workstation Name: [employer]    [facility]    XR Chest 1 View [[id number]] Collected: 11/07/20 2112     Updated: 11/07/20 2114    Narrative:      CR Chest 1 Vw    INDICATION:   Postcholecystectomy with cough. Patient very kyphotic.     COMPARISON:    Chest x-ray from 11/2/2020.    FINDINGS:  Single portable AP view(s) of the chest.  Film is quite lordotic. The postoperative heart is enlarged. There is airspace disease at the left greater than right lung base. There may be layering pleural fluid on the left. Its difficult to compare current  and prior study due to different positioning. There is probably mild interstitial edema.      Impression:      Study is considerably limited by lordotic positioning. Allowing for this there is probably no interval change to slight worsening in bibasilar left greater than right-sided airspace disease and postoperative cardiac silhouette enlargement. There may be  mild interstitial edema. No pneumothorax. Follow-up recommended.     Signer Name: Frances Medrano MD   Signed: 11/7/2020 9:12 PM   Workstation Name: [employer]     [facility]             Assessment/Plan   Assessment & Plan     Active Hospital Problems    Diagnosis POA   • **Post-op pneumonia [J95.89, J18.9] Yes   • History of throat cancer [Z85.819] Yes   • Chronic pain [G89.29] Unknown   • Dysphagia [R13.10] Yes   • Obstructive sleep apnea syndrome [G47.33] Yes   • Hypertensive disorder [I10] Yes     60 year old male with history of throat cancer s/p surgical resection and radiation w/ severe resulting kyphosis and chronic dysphagia who had recent lap drea and PEG placement who returns to the ER tonight for evaluation of abdominal pain and fever. Found on CT abd/pelvis to have bilateral bibasilar pneumonia.    Plan:    Post op pneumonia:  - admit to tele  - zosyn IV Q 8 hours  - pharmacy to dose vancomycin  - blood cultures obtained in ER, follow  - respiratory culture ordered  - respiratory PCR w/ COVID r/o ordered  - PT/OT to see for decreased mobility  - NS @ 100 ml/hr x 10 hours  - maintain NPO until cleared by SLP    Post op abdominal pain:  - add simethicone QID x 2 days  - increase bowel regimen (currently on docusate daily, increase to BID)  - ok to continue home Linzess  - ducoalox suppository and milk of mag PRN  - will give suppository x 1 now  - continue reglan, change to IV for 24 hours then back to PO    Dysphagia s/p h/o throat cancer w/ resection and radiation tx:  - will have SLP see patient  - will have nutrition see patient, per wife the patient is agreeable to having 2 cans of tube feeding daily per notes above, will defer to day team to continue per nutrition recs    Home oxygen w/ [facility] w/ home BIPAP use:  - BIPAP prn  - continue oxygen, baseline at home 2.5 liters    Seasonal allergies:  - continue antihistamine/decongestant, if patient BP uncontrolled or he becomes tachycardic may need to d/c the decongestant but will continue for now as a chronic home medication    Hypothyroidism:  - check TSH and Hgb A1c in am  - continue  levothyroxine replacement    Chronic constipation:  - increase docusate to BID  - may take home Linzess  - add ducalax suppository and milk of mag PRN    HTN / HLD:  - may take home zetia  - continue statin  - continue nitro patch, remove for SBP < 110  - continue metoprolol 25 mg BID, hold for HR < 60 or SBP < 110    Chronic pain / anxiety w/ chronic opiates and benzo use:  - continue home pain medications except fiorcet (oxycodone 20 mg Q 12 hours, percocet 10 Q 6 hours)  - continue home tizanidine 4mg Q 8 hours PRN, hold for sedation/low BP  - continue gabapentin 400 mg TID, hold for sedation  - continue risperidone 1 mg BID, hold for sedation  - continue trazodone 50 mg am/ 75mg pm, hold for sedation  - continue ativan 2 mg PO Q 6 hours PRN      DVT prophylaxis:  Heparin CS  Patient needs medication simplification.    CODE STATUS:  D/w patient and wife, he wishes to be a full code status  Code Status and Medical Interventions:   Ordered at: 11/08/20 0018     Code Status:    CPR     Medical Interventions (Level of Support Prior to Arrest):    Full       Electronically signed by LEILA Ferreira, 11/08/20, 12:38 AM EST.    Attending   Admission Attestation       I have seen and examined the patient, performing an independent face-to-face diagnostic evaluation with plan of care reviewed and developed with the advanced practice clinician (APC).      Brief Summary Statement:   60 year old male with history of throat cancer s/p surgical resection and radiation w/ severe resulting kyphosis and chronic dysphagia who had recent lap drea and PEG placement who returns to the ER tonight for evaluation of abdominal pain and fever. Found on CT abd/pelvis to have bilateral bibasilar pneumonia.  She reports it hurts to cough and do take a deep breath.  Nursing reports he is not safe to swallow.  He has not used but 1 can of tube feedings since he is been home.  He has been eating and drinking a little.    Remainder of  "detailed HPI is as noted by APC and has been reviewed and/or edited by me for completeness.    Attending Physical Exam:  /82 (BP Location: Left arm, Patient Position: Lying)   Pulse 87   Temp 98.9 °F (37.2 °C) (Oral)   Resp 22   Ht 157.5 cm (62\")   Wt 107 kg (235 lb 1.6 oz)   SpO2 94%   BMI 43.00 kg/m²     Patient is alert and talkative   Neck is short he is kyphotic  Heart is Reg wo murmur  Lungs are congested with poor inspiratory effort, tachypneic  Abdomen is soft without HSM or mass, it is tender, he has significant hematomas specially on the right flank  MAEW, no edema  Skin is without rash  Neurologic exam in nonfocal   Mood is appropriate    Data:  I have personally reviewed most recent data most concerning for : Bilateral infiltrates,    Brief Assessment/Plan :  See detailed assessment and plan developed with APC which I have reviewed and/or edited for completeness.    I believe this patient meets INPATIENT status due to respiratory distress due to acute on chronic aspiration, poorly controlled pain, not safe to tolerate p.o. medications will need to be in the hospital until his pneumonia is better controlled and other plans  I feel patient’s risk for adverse outcomes and need for care warrant INPATIENT evaluation and I predict the patient’s care encounter to likely last beyond 2 midnights.      Electronically signed by Chayito Chin MD, 20, 2:46 AM EST.         Electronically signed by Chayito Chin MD at 20 0436          Discharge Summary      Laly Mitchell II, DO at 20 0952              [facility] Medicine Services  DISCHARGE SUMMARY    Patient Name: Sonido Villaseñor  : 1960  MRN: [id number]    Date of Admission: 2020  7:15 PM  Date of Discharge:  2020  Primary Care Physician: Jorge Fernandez MD    Consults     Date and Time Order Name Status Description    2020 0149 Inpatient General Surgery Consult Completed     "       Hospital Course     Presenting Problem:   Post-op pneumonia [J95.89, J18.9]  Post-op pneumonia [J95.89, J18.9]  Post-op pneumonia [J95.89, J18.9]  Post-op pneumonia [J95.89, J18.9]    Active Hospital Problems    Diagnosis  POA   • History of throat cancer [Z85.819]  Yes   • Chronic pain [G89.29]  Yes   • Dysphagia [R13.10]  Yes   • Obstructive sleep apnea syndrome [G47.33]  Yes   • Hypertensive disorder [I10]  Yes      Resolved Hospital Problems    Diagnosis Date Resolved POA   • **Post-op pneumonia [J95.89, J18.9] 11/11/2020 Yes          Hospital Course:  Sonido Villaseñor is a 60 y.o. male with history of throat cancer and chronic dysphagia, RENO on Trilogy at home and recent cholecystectomy presents with abdominal pain and aspiration pneumonia     Aspiration pneumonia  Dysphagia  - Upon arrival patient placed on IV zosyn and responded well. He will transition to augmentin liquid via tube for 3 more days at d/c.  - Was seen by SLP and is aspirating all consistencies per Speech. Continue tube feeds -- per spouse, patient eats a regular diet at home, and would like to return to this manner of eating. If he truly wishes to eat comfort diet at home will need to consider AND status. Can discuss on ongoing basis as outpt.    Discharge Follow Up Recommendations for outpatient labs/diagnostics:   PCP in 1-2 weeks   Dr. Fajardo in 3 weeks    Day of Discharge     HPI: Lying in bed with spouse in room. Feels better. Wants to go home.    Review of Systems  Gen- No fevers, chills  CV- No chest pain, palpitations  Resp- No cough, dyspnea  GI- No N/V/D, abd pain    Vital Signs:   Temp:  [98.2 °F (36.8 °C)-99.9 °F (37.7 °C)] 98.6 °F (37 °C)  Heart Rate:  [] 71  Resp:  [16-20] 16  BP: (128-142)/(75-79) 137/75     Physical Exam:  Constitutional: No acute distress, awake, alert, weak appearing  HENT: NCAT, mucous membranes moist  Respiratory: Clear to auscultation bilaterally, respiratory effort normal   Cardiovascular: RRR, no  murmurs, rubs, or gallops, palpable pedal pulses bilaterally  Gastrointestinal: Positive bowel sounds, soft, nontender, nondistended  Musculoskeletal: No bilateral ankle edema  Psychiatric: Appropriate affect, cooperative  Neurologic: Oriented x 3, strength symmetric in all extremities, Cranial Nerves grossly intact to confrontation, speech clear  Skin: No rashes    Pertinent  and/or Most Recent Results     Results from last 7 days   Lab Units 11/11/20  0226 11/10/20  0608 11/10/20  0607 11/09/20  2045 11/09/20  1313 11/09/20  0519 11/08/20  2242 11/08/20  0827 11/07/20 1803 11/06/20  0551   WBC 10*3/mm3  --  5.24  --   --  4.53 5.28  --  5.52 7.51 11.75*   HEMOGLOBIN g/dL  --  10.5*  --   --  9.6* 9.7*  --  10.0* 10.2* 11.5*   HEMATOCRIT %  --  32.9*  --   --  30.3* 31.0*  --  31.6* 32.7* 36.7*   PLATELETS 10*3/mm3  --  113*  --   --  102* 89*  --  72* 84* 99*   SODIUM mmol/L  --   --  138  --   --  138  --  139 136 139   POTASSIUM mmol/L 4.0  --  3.1* 3.6  --  3.4* 3.7 3.1* 3.5 3.6   CHLORIDE mmol/L  --   --  104  --   --  105  --  104 100 104   CO2 mmol/L  --   --  22.0  --   --  21.0*  --  26.0 25.0 27.0   BUN mg/dL  --   --  8  --   --  7*  --  9 12 11   CREATININE mg/dL  --   --  0.69*  --   --  0.71*  --  0.74* 0.84 0.79   GLUCOSE mg/dL  --   --  118*  --   --  94  --  108* 103* 137*   CALCIUM mg/dL  --   --  8.7  --   --  8.1*  --  8.3* 8.7 8.6     Results from last 7 days   Lab Units 11/07/20 1803 11/06/20  0551   BILIRUBIN mg/dL 0.3 0.3   ALK PHOS U/L 18* 17*   ALT (SGPT) U/L 18 23   AST (SGOT) U/L 21 28           Invalid input(s): TG, LDLCALC, LDLREALC  Results from last 7 days   Lab Units 11/10/20  0607 11/08/20  0827 11/07/20  1803   TSH uIU/mL  --  3.500  --    HEMOGLOBIN A1C %  --  5.00  --    PROBNP pg/mL 893.3  --   --    PROCALCITONIN ng/mL 0.23  --  0.63*   LACTATE mmol/L  --   --  1.5       Brief Urine Lab Results  (Last result in the past 365 days)      Color   Clarity   Blood   Leuk Est    Nitrite   Protein   CREAT   Urine HCG        11/07/20 2038 Yellow Clear Trace Negative Negative Trace               Microbiology Results Abnormal     Procedure Component Value - Date/Time    Blood Culture - Blood, Hand, Right [[id number]] Collected: 11/07/20 2005    Lab Status: Preliminary result Specimen: Blood from Hand, Right Updated: 11/10/20 2045     Blood Culture No growth at 3 days    Blood Culture - Blood, Wrist, Left [[id number]] Collected: 11/07/20 1956    Lab Status: Preliminary result Specimen: Blood from Wrist, Left Updated: 11/10/20 2045     Blood Culture No growth at 3 days    MRSA Screen, PCR (Inpatient) - Swab, Nares [[id number]]  (Normal) Collected: 11/09/20 1528    Lab Status: Final result Specimen: Swab from Nares Updated: 11/09/20 1732     MRSA PCR Negative    Narrative:      MRSA Negative    S. Pneumo Ag Urine or CSF - Urine, Urine, Clean Catch [[id number]]  (Normal) Collected: 11/08/20 0555    Lab Status: Final result Specimen: Urine, Clean Catch Updated: 11/08/20 1254     Strep Pneumo Ag Negative    Legionella Antigen, Urine - Urine, Urine, Clean Catch [[id number]]  (Normal) Collected: 11/08/20 0555    Lab Status: Final result Specimen: Urine, Clean Catch Updated: 11/08/20 1254     LEGIONELLA ANTIGEN, URINE Negative    COVID PRE-OP / PRE-PROCEDURE SCREENING ORDER (NO ISOLATION) - Swab, Nasopharynx [[id number]]  (Normal) Collected: 11/08/20 0131    Lab Status: Final result Specimen: Swab from Nasopharynx Updated: 11/08/20 0304    Narrative:      The following orders were created for panel order COVID PRE-OP / PRE-PROCEDURE SCREENING ORDER (NO ISOLATION) - Swab, Nasopharynx.  Procedure                               Abnormality         Status                     ---------                               -----------         ------                     Respiratory Panel PCR w/...[[id number]]  Normal              Final result                 Please view results for these tests on the individual orders.     Respiratory Panel PCR w/COVID-19(SARS-CoV-2) HARMONY/CAN/HARRIET/PAD/COR/MAD/VAZQUEZ In-House, NP Swab in UTM/VTM, 3-4 HR TAT - Swab, Nasopharynx [[id number]]  (Normal) Collected: 11/08/20 0131    Lab Status: Final result Specimen: Swab from Nasopharynx Updated: 11/08/20 0304     ADENOVIRUS, PCR Not Detected     Coronavirus 229E Not Detected     Coronavirus HKU1 Not Detected     Coronavirus NL63 Not Detected     Coronavirus OC43 Not Detected     COVID19 Not Detected     Human Metapneumovirus Not Detected     Human Rhinovirus/Enterovirus Not Detected     Influenza A PCR Not Detected     Influenza A H1 Not Detected     Influenza A H1 2009 PCR Not Detected     Influenza A H3 Not Detected     Influenza B PCR Not Detected     Parainfluenza Virus 1 Not Detected     Parainfluenza Virus 2 Not Detected     Parainfluenza Virus 3 Not Detected     Parainfluenza Virus 4 Not Detected     RSV, PCR Not Detected     Bordetella pertussis pcr Not Detected     Bordetella parapertussis PCR Not Detected     Chlamydophila pneumoniae PCR Not Detected     Mycoplasma pneumo by PCR Not Detected    Narrative:      Fact sheet for providers: https://docs.[employer]/wp-content/uploads/LYS2467-2410-CB3.1-EUA-Provider-Fact-Sheet-3.pdf    Fact sheet for patients: https://docs.[employer]/wp-content/uploads/IBF5989-5186-XC8.1-EUA-Patient-Fact-Sheet-1.pdf          Imaging Results (All)     Procedure Component Value Units Date/Time    XR Chest 1 View [[id number]] Collected: 11/10/20 0804     Updated: 11/10/20 1308    Narrative:      EXAMINATION: XR CHEST 1 VW- 11/10/2020     INDICATION: pneumonia; J95.89-Other postprocedural complications and  disorders of respiratory system, not elsewhere classified;  J18.9-Pneumonia, unspecified organism; R50.82-Postprocedural fever;  R10.84-Generalized abdominal pain; R13.10-Dysphagia, unspecified      COMPARISON: Chest x-ray 11/07/2020     FINDINGS: Cardiac size enlarged with increased central pulmonary  vascularity and low  lung volumes with hypoventilatory effects similar to  prior. No pneumothorax with potential trace left effusion versus  airspace disease of left lung base.       Impression:      Low lung volumes with hypoventilatory effects demonstrate  opacifications at the left lung base.  Atelectasis versus minimal  airspace disease with potential trace layering effusion overall similar  appearance to prior without new consolidation or large effusion  development.     D:  11/10/2020  E:  11/10/2020     This report was finalized on 11/10/2020 1:05 PM by Dr. Oscar Brooks.       CT Abdomen Pelvis With Contrast [[id number]] Collected: 11/07/20 2350     Updated: 11/07/20 2352    Narrative:      CT Abdomen Pelvis W    INDICATION:   Abdominal pain and fever. Postop patient. Recent cholecystectomy and G-tube placement.    TECHNIQUE:   CT of the abdomen and pelvis with IV contrast. Coronal and sagittal reconstructions were obtained.  Radiation dose reduction techniques included automated exposure control or exposure modulation based on body size. Count of known CT and cardiac nuc med  studies performed in previous 12 months: 0.     COMPARISON:   None available.    FINDINGS:  Abdomen: Small pleural effusions and bibasilar atelectasis or pneumonia. Background emphysema. Large hiatal hernia. Normal caliber aorta and atherosclerotic change. Status post ileac stent graft placement. Spleen adrenal glands and pancreas are negative.  Gallbladder surgically absent. There is some induration of the fat planes in the gallbladder fossa and adjacent to the second portion of the duodenum, likely related to recent surgery. No drainable fluid collection. Probable physiologic prominence of the  extrahepatic common bile duct. G-tube in stomach. Negative kidneys.    Pelvis: Negative bladder and prostate gland. No drainable fluid collection. High riding sigmoid colon. No bowel obstruction or focal inflammatory change of the bowel. There is redundancy of the  ascending colon which is positioned in the right upper  quadrant. The appendix is dilated up to 9 mm. No distinct inflammatory change of the appendix otherwise identified but this is technically abnormal and early acute appendicitis could present similarly. Negative inguinal canals. No suspicious bone lesion.      Impression:      1. Postoperative changes of cholecystectomy with induration of the fat planes in the gallbladder fossa without associated drainable fluid collection to suggest abscess. No free air.  2. The appendix is dilated up to 9 mm. No additional evidence of acute appendicitis but early acute appendicitis is in the differential in the appropriate clinical context.  3. Bibasilar atelectasis or pneumonia and small effusions.  4. Large hiatal hernia.            Signer Name: Andres Luna MD   Signed: 11/7/2020 11:50 PM   Workstation Name: [email or web address]    [facility]    XR Chest 1 View [[id number]] Collected: 11/07/20 2112     Updated: 11/07/20 2114    Narrative:      CR Chest 1 Vw    INDICATION:   Postcholecystectomy with cough. Patient very kyphotic.     COMPARISON:    Chest x-ray from 11/2/2020.    FINDINGS:  Single portable AP view(s) of the chest.  Film is quite lordotic. The postoperative heart is enlarged. There is airspace disease at the left greater than right lung base. There may be layering pleural fluid on the left. Its difficult to compare current  and prior study due to different positioning. There is probably mild interstitial edema.      Impression:      Study is considerably limited by lordotic positioning. Allowing for this there is probably no interval change to slight worsening in bibasilar left greater than right-sided airspace disease and postoperative cardiac silhouette enlargement. There may be  mild interstitial edema. No pneumothorax. Follow-up recommended.     Signer Name: Frances Medrano MD   Signed: 11/7/2020 9:12 PM   Workstation Name: [employer]     [facility]                         Discharge Details        Discharge Medications      New Medications      Instructions Start Date   amoxicillin-clavulanate 600-42.9 MG/5ML suspension  Commonly known as: Augmentin ES-600   600 mg, Oral, 2 Times Daily         Changes to Medications      Instructions Start Date   oxyCODONE ER 20 MG 12 hr tablet  Commonly known as: oxyCONTIN  What changed: Another medication with the same name was removed. Continue taking this medication, and follow the directions you see here.   20 mg, Oral, Every 12 Hours         Continue These Medications      Instructions Start Date   aspirin 81 MG EC tablet   81 mg, Oral, Daily      butalbital-acetaminophen-caffeine -40 MG per tablet  Commonly known as: FIORICET, ESGIC   1 tablet, Oral, 2 times daily, No more than 2-3 times per week       docusate sodium 150 MG/15ML liquid  Commonly known as: COLACE   100 mg, Oral, Daily      escitalopram 20 MG tablet  Commonly known as: LEXAPRO   20 mg, Oral, 2 times daily      esomeprazole 40 MG capsule  Commonly known as: nexIUM   40 mg, Oral, Every Morning Before Breakfast      ezetimibe 10 MG tablet  Commonly known as: ZETIA   10 mg, Oral, Nightly      ferrous sulfate 325 (65 FE) MG tablet   325 mg, Oral, Daily With Breakfast      fexofenadine-pseudoephedrine 180-240 MG per 24 hr tablet  Commonly known as: ALLEGRA-D 24   1 tablet, Oral, Daily PRN      gabapentin 400 MG capsule  Commonly known as: NEURONTIN   600 mg, Oral, 3 Times Daily      levothyroxine 88 MCG tablet  Commonly known as: SYNTHROID, LEVOTHROID   88 mcg, Oral, Daily      linaclotide 290 MCG capsule capsule  Commonly known as: LINZESS   290 mcg, Oral, Every Morning Before Breakfast      LORazepam 2 MG tablet  Commonly known as: ATIVAN   2 mg, Oral, Every 6 Hours PRN      Melatonin 10 MG tablet   Oral, Nightly      metoclopramide 10 MG tablet  Commonly known as: REGLAN   10 mg, Oral, 4 Times Daily Before Meals &  Nightly      metoprolol tartrate 25 MG tablet  Commonly known as: LOPRESSOR   25 mg, Oral, 2 Times Daily      nitroglycerin 0.4 MG/HR patch  Commonly known as: NITRODUR   1 patch, Transdermal, Daily      nitroglycerin 0.4 MG SL tablet  Commonly known as: NITROSTAT   0.4 mg, Sublingual, Every 5 Minutes PRN, Take no more than 3 doses in 15 minutes.       oxyCODONE-acetaminophen  MG per tablet  Commonly known as: PERCOCET   1 tablet, Oral, Every 6 Hours PRN      risperiDONE 1 MG tablet  Commonly known as: risperDAL   1 mg, Oral, 2 Times Daily      rosuvastatin 10 MG tablet  Commonly known as: CRESTOR   10 mg, Oral, Nightly      tiZANidine 4 MG tablet  Commonly known as: ZANAFLEX   4 mg, Oral, Every 8 Hours PRN      topiramate 50 MG tablet  Commonly known as: TOPAMAX   50 mg, Oral, 2 Times Daily      traZODone 100 MG tablet  Commonly known as: DESYREL   50 mg, Oral, 2 times daily, 1/2 in morning and 1 1/2 at bed       Vitamin D 50 MCG (2000 UT) tablet   2,000 Units, Oral, Daily         Stop These Medications    amoxicillin 875 MG tablet  Commonly known as: AMOXIL            Allergies   Allergen Reactions   • Cefaclor Anaphylaxis   • Ceftriaxone Rash         Discharge Disposition:  Home or Self Care    Diet:  Hospital:  Diet Order   Procedures   • NPO Diet            CODE STATUS:    Code Status and Medical Interventions:   Ordered at: 11/08/20 0018     Code Status:    CPR     Medical Interventions (Level of Support Prior to Arrest):    Full       No future appointments.    Additional Instructions for the Follow-ups that You Need to Schedule     Ambulatory Referral to Home Health   As directed      Face to Face Visit Date: 11/10/2020    Follow-up provider for Plan of Care?: I treated the patient in an acute care facility and will not continue treatment after discharge.    Follow-up provider: AGNIESZKA MAYA [[id number]]    Reason/Clinical Findings: hospital admit, chronic illness weakness debility    Describe mobility  limitations that make leaving home difficult: weakness debility    Nursing/Therapeutic Services Requested: Skilled Nursing Physical Therapy Occupational Therapy    Skilled nursing orders: Medication education (education on disease process) Cardiopulmonary assessments O2 instruction    PT orders: Transfer training Strengthening Home safety assessment    Occupational orders: Activities of daily living Strengthening Home safety assessment    Frequency: 1 Week 1         Discharge Follow-up with Specialty: Dr. Fajardo; 2 Weeks   As directed      Specialty: Dr. Fajardo    Follow Up: 2 Weeks                     Laly Mitchell II, DO  11/11/20      Time Spent on Discharge:  I spent  32  minutes on this discharge activity which included: face-to-face encounter with the patient, reviewing the data in the system, coordination of the care with the nursing staff as well as consultants, documentation, and entering orders.          Electronically signed by Laly Mitchell II, DO at 11/11/20 0673

## 2020-11-12 ENCOUNTER — READMISSION MANAGEMENT (OUTPATIENT)
Dept: CALL CENTER | Facility: HOSPITAL | Age: 60
End: 2020-11-12

## 2020-11-12 LAB
BACTERIA SPEC AEROBE CULT: NORMAL
BACTERIA SPEC AEROBE CULT: NORMAL

## 2020-11-13 NOTE — OUTREACH NOTE
Prep Survey      Responses   [facility] facility patient discharged from?  [place]   Is LACE score < 7 ?  No   [facility]   Date of Admission  11/07/20   Date of Discharge  11/11/20   Discharge Disposition  Home-Health Care Sv   Discharge diagnosis  aspiration pna, tube feedings started   Does the patient have one of the following disease processes/diagnoses(primary or secondary)?  COPD/Pneumonia   Does the patient have Home health ordered?  Yes   What is the Home health agency?   [facility] feed[facility]   Is there a DME ordered?  No   Prep survey completed?  Yes          Melva Spencer RN

## 2020-11-16 ENCOUNTER — READMISSION MANAGEMENT (OUTPATIENT)
Dept: CALL CENTER | Facility: HOSPITAL | Age: 60
End: 2020-11-16

## 2020-11-16 NOTE — OUTREACH NOTE
COPD/PN Week 1 Survey      Responses   [facility] patient discharged from?  [place]   Does the patient have one of the following disease processes/diagnoses(primary or secondary)?  COPD/Pneumonia   Was the primary reason for admission:  Pneumonia   Week 1 attempt successful?  Yes   Call start time  1021   Call end time  1026   Discharge diagnosis  aspiration pna, tube feedings started   List who call center can speak with  Marce -wife   Person spoke with today (if not patient) and relationship  Marce   Meds reviewed with patient/caregiver?  Yes   Is the patient having any side effects they believe may be caused by any medication additions or changes?  No   Does the patient have all medications ordered at discharge?  Yes   Is the patient taking all medications as directed (includes completed medication regime)?  Yes   Does the patient have a primary care provider?   Yes   Does the patient have an appointment with their PCP or specialist within 7 days of discharge?  Yes   Has the patient kept scheduled appointments due by today?  Yes   What is the Home health agency?   [facility]   Has home health visited the patient within 72 hours of discharge?  Yes   Pulse Ox monitoring  Intermittent   Pulse Ox device source  Patient   O2 Sat: education provided  Sat levels   O2 Sat education comments  2 1/2 L/min o2 sats are 91%   Psychosocial issues?  No   Did the patient receive a copy of their discharge instructions?  Yes   Nursing interventions  Reviewed instructions with patient   What is the patient's perception of their health status since discharge?  Same   Nursing Interventions  Nurse provided patient education   Nursing interventions  Educated on importance of maintaining up to date immunizations as advised by provider   If the patient is a current smoker, are they able to teach back resources for cessation?  Not a smoker   Is the patient/caregiver able to teach back the  hierarchy of who to call/visit for symptoms/problems? PCP, Specialist, Home health nurse, Urgent Care, ED, 911  Yes   Is the patient/caregiver able to teach back signs and symptoms of worsening condition:  Fever/chills, Shortness of breath, Chest pain   Is the patient/caregiver able to teach back importance of completing antibiotic course of treatment?  Yes   Week 1 call completed?  Yes          Mira Ferris, RN

## 2020-11-24 ENCOUNTER — READMISSION MANAGEMENT (OUTPATIENT)
Dept: CALL CENTER | Facility: HOSPITAL | Age: 60
End: 2020-11-24

## 2020-12-03 ENCOUNTER — READMISSION MANAGEMENT (OUTPATIENT)
Dept: CALL CENTER | Facility: HOSPITAL | Age: 60
End: 2020-12-03

## 2020-12-03 NOTE — OUTREACH NOTE
COPD/PN Week 3 Survey      Responses   [facility] patient discharged from?  [place]   Does the patient have one of the following disease processes/diagnoses(primary or secondary)?  COPD/Pneumonia   Was the primary reason for admission:  Pneumonia   Week 3 attempt successful?  Yes   Call start time  0728   Rescheduled  Rescheduled-pt requested [Sleeping, not a good time. ]   Call end time  0729   Discharge diagnosis  aspiration pna, tube feedings started          Ibeth Zimmer RN

## 2020-12-09 ENCOUNTER — READMISSION MANAGEMENT (OUTPATIENT)
Dept: CALL CENTER | Facility: HOSPITAL | Age: 60
End: 2020-12-09

## 2020-12-09 NOTE — OUTREACH NOTE
COPD/PN Week 3 Survey      Responses   [facility] patient discharged from?  [place]   Does the patient have one of the following disease processes/diagnoses(primary or secondary)?  COPD/Pneumonia   Was the primary reason for admission:  Pneumonia   Week 3 attempt successful?  Yes   Call start time  1349   Call end time  1355   Discharge diagnosis  aspiration pna, tube feedings started   Is patient permission given to speak with other caregiver?  Yes   List who call center can speak with  Marce -wife   Person spoke with today (if not patient) and relationship  Marce   Meds reviewed with patient/caregiver?  Yes   Is the patient having any side effects they believe may be caused by any medication additions or changes?  No   Does the patient have all medications ordered at discharge?  Yes   Is the patient taking all medications as directed (includes completed medication regime)?  Yes   Does the patient have a primary care provider?   Yes   Does the patient have an appointment with their PCP or specialist within 7 days of discharge?  Yes   Has the patient kept scheduled appointments due by today?  Yes   What is the Home health agency?   [facility] providing tube feedings   Has home health visited the patient within 72 hours of discharge?  Yes   Pulse Ox monitoring  Intermittent   Pulse Ox device source  Patient   O2 Sat comments  O2 2.5 liters, sat is 99%   O2 Sat: education provided  Sat levels   O2 Sat education comments  2 1/2 L/min o2 sats are 91%   Psychosocial issues?  No   Did the patient receive a copy of their discharge instructions?  Yes   Nursing interventions  Reviewed instructions with patient   What is the patient's perception of their health status since discharge?  Returned to baseline/stable   Nursing Interventions  Nurse provided patient education   Are the patient's immunizations up to date?   Yes   Nursing interventions  Educated on importance of maintaining up to date  immunizations as advised by provider   If the patient is a current smoker, are they able to teach back resources for cessation?  Not a smoker   Is the patient/caregiver able to teach back the hierarchy of who to call/visit for symptoms/problems? PCP, Specialist, Home health nurse, Urgent Care, ED, 911  Yes   Is the patient/caregiver able to teach back signs and symptoms of worsening condition:  Fever/chills, Shortness of breath, Chest pain   Is the patient/caregiver able to teach back importance of completing antibiotic course of treatment?  Yes   Week 3 call completed?  Yes          Andrei Childers RN

## 2021-02-26 ENCOUNTER — DOCUMENTATION (OUTPATIENT)
Dept: NUTRITION | Facility: HOSPITAL | Age: 61
End: 2021-02-26

## 2021-03-03 ENCOUNTER — DOCUMENTATION (OUTPATIENT)
Dept: NUTRITION | Facility: HOSPITAL | Age: 61
End: 2021-03-03

## 2021-03-03 NOTE — PROGRESS NOTES
ONC Nutrition    Patient's wife has requested that patient's enteral feeding be switched from present product of Isosource 1.5 to a more calorically dense product to decrease the amount that he is dependent on for enteral feedings.  She states that he is becoming more dependent on the enteral feedings for support as his oral food intake has diminished.  She stated that the recommendation for oral intake at the time of hospital discharge was for taste and pleasure.  Reviewed patient's oral intake for the past several days which indicates no intake to just bites, contributing insignificantly to nutritional needs.    Calories: 1700 -1845 calories to maintain current weight (based on IBW)  Protein: 72(+) grams per day  Water: 2000 ml / 67 ounces    To meet nutritional needs via enteral feeding, recommend 3 1/2  cans Nutren 2.0 per day to provide 1750 calories, 74 grams protein and 605 ml water.  Additional water needs are approximately 1500 ml per day.    Wife has requested a pump for administration, as patient is becoming increasing intolerant of bolus feedings / a order for the feeding pump was sent to [facility] by the patient's PCP office.    Feeding Schedule Recommendations  Day #1 - Infuse Nutren 2.0 @ 25 ml per hour x 14 hours  Day #2 - Infuse Nutren 2.0 @ 50 ml per hour x 14 hours  Day #3 - Infuse Nutren 2.0 @ 60 ml per hour x 14 (+) hours   Additional 1500 ml free water will be administered via PEG and also via oral intake.  Patient's wife states that patient has no difficulty swallowing water and tends to drink it all through the day.    Will follow as needed / patient's wife has my name and phone number to contact for any additional questions.    [facility] will contact [facility] for FU site visit and review of operating instructions for feeding pump.

## (undated) DEVICE — ENDOPATH XCEL BLADELESS TROCARS WITH STABILITY SLEEVES: Brand: ENDOPATH XCEL

## (undated) DEVICE — ANTIBACTERIAL UNDYED BRAIDED (POLYGLACTIN 910), SYNTHETIC ABSORBABLE SUTURE: Brand: COATED VICRYL

## (undated) DEVICE — ENDOPOUCH RETRIEVER SPECIMEN RETRIEVAL BAGS: Brand: ENDOPOUCH RETRIEVER

## (undated) DEVICE — Device

## (undated) DEVICE — [HIGH FLOW INSUFFLATOR,  DO NOT USE IF PACKAGE IS DAMAGED,  KEEP DRY,  KEEP AWAY FROM SUNLIGHT,  PROTECT FROM HEAT AND RADIOACTIVE SOURCES.]: Brand: PNEUMOSURE

## (undated) DEVICE — DRESSING,OPTIFOAM,NON-ADHESIVE,4X4: Brand: MEDLINE

## (undated) DEVICE — GOWN,PREVENTION PLUS,XXLARGE,STERILE: Brand: MEDLINE

## (undated) DEVICE — ST EXT IV TBG W SECUR LK 20IN

## (undated) DEVICE — PK LAP LASR CHOLE 10

## (undated) DEVICE — ENDOCUT SCISSOR TIP, DISPOSABLE: Brand: RENEW

## (undated) DEVICE — DRAINBAG,ANTI-REFLUX TOWER,L/F,2000ML,LL: Brand: MEDLINE

## (undated) DEVICE — ENDOPATH XCEL UNIVERSAL TROCAR STABLILITY SLEEVES: Brand: ENDOPATH XCEL

## (undated) DEVICE — SYR LUERLOK 20CC BX/50

## (undated) DEVICE — SUT VIC 0 UR6 27IN VCP603H

## (undated) DEVICE — THE BITE BLOCK MAXI, LATEX FREE STRAP IS USED TO PROTECT THE ENDOSCOPE INSERTION TUBE FROM BEING BITTEN BY THE PATIENT.

## (undated) DEVICE — SYR LUERLOK 30CC

## (undated) DEVICE — GLV SURG SENSICARE PI MIC PF SZ7 LF STRL

## (undated) DEVICE — SYR LUERLOK 50ML

## (undated) DEVICE — PERCUTANEOUS ENDOSCOPIC GASTROSTOMY KIT: Brand: ENDOVIVE SAFETY PEG KIT

## (undated) DEVICE — LUER-LOK 360°: Brand: CONNECTA, LUER-LOK

## (undated) DEVICE — GLV SURG SENSICARE PI MIC PF SZ7.5 LF STRL

## (undated) DEVICE — VISUALIZATION SYSTEM: Brand: CLEARIFY

## (undated) DEVICE — SNAP KOVER: Brand: UNBRANDED

## (undated) DEVICE — CVR HNDL LIGHT RIGID

## (undated) DEVICE — SUT SILK 2/0 TIES 18IN A185H